# Patient Record
Sex: FEMALE | Race: WHITE | NOT HISPANIC OR LATINO
[De-identification: names, ages, dates, MRNs, and addresses within clinical notes are randomized per-mention and may not be internally consistent; named-entity substitution may affect disease eponyms.]

---

## 2018-08-31 ENCOUNTER — APPOINTMENT (OUTPATIENT)
Dept: CARDIOLOGY | Facility: CLINIC | Age: 58
End: 2018-08-31

## 2019-07-19 ENCOUNTER — APPOINTMENT (OUTPATIENT)
Dept: CARDIOLOGY | Facility: CLINIC | Age: 59
End: 2019-07-19

## 2019-11-11 ENCOUNTER — APPOINTMENT (OUTPATIENT)
Dept: ULTRASOUND IMAGING | Facility: IMAGING CENTER | Age: 59
End: 2019-11-11
Payer: MEDICARE

## 2019-11-11 ENCOUNTER — APPOINTMENT (OUTPATIENT)
Dept: CT IMAGING | Facility: IMAGING CENTER | Age: 59
End: 2019-11-11
Payer: MEDICARE

## 2019-11-11 ENCOUNTER — OUTPATIENT (OUTPATIENT)
Dept: OUTPATIENT SERVICES | Facility: HOSPITAL | Age: 59
LOS: 1 days | End: 2019-11-11
Payer: MEDICARE

## 2019-11-11 DIAGNOSIS — Z41.9 ENCOUNTER FOR PROCEDURE FOR PURPOSES OTHER THAN REMEDYING HEALTH STATE, UNSPECIFIED: Chronic | ICD-10-CM

## 2019-11-11 DIAGNOSIS — Z00.8 ENCOUNTER FOR OTHER GENERAL EXAMINATION: ICD-10-CM

## 2019-11-11 PROCEDURE — 93970 EXTREMITY STUDY: CPT | Mod: 26

## 2019-11-11 PROCEDURE — 71275 CT ANGIOGRAPHY CHEST: CPT | Mod: 26

## 2019-11-11 PROCEDURE — 93970 EXTREMITY STUDY: CPT

## 2019-11-11 PROCEDURE — 71275 CT ANGIOGRAPHY CHEST: CPT

## 2019-12-01 ENCOUNTER — FORM ENCOUNTER (OUTPATIENT)
Age: 59
End: 2019-12-01

## 2019-12-02 ENCOUNTER — APPOINTMENT (OUTPATIENT)
Dept: PULMONOLOGY | Facility: CLINIC | Age: 59
End: 2019-12-02
Payer: MEDICARE

## 2019-12-02 VITALS
TEMPERATURE: 98.2 F | WEIGHT: 120 LBS | SYSTOLIC BLOOD PRESSURE: 190 MMHG | HEART RATE: 87 BPM | HEIGHT: 60 IN | OXYGEN SATURATION: 96 % | BODY MASS INDEX: 23.56 KG/M2 | RESPIRATION RATE: 14 BRPM | DIASTOLIC BLOOD PRESSURE: 90 MMHG

## 2019-12-02 VITALS — DIASTOLIC BLOOD PRESSURE: 90 MMHG | SYSTOLIC BLOOD PRESSURE: 130 MMHG

## 2019-12-02 DIAGNOSIS — Z87.891 PERSONAL HISTORY OF NICOTINE DEPENDENCE: ICD-10-CM

## 2019-12-02 LAB — POCT - HEMOGLOBIN (HGB), QUANTITATIVE, TRANSCUTANEOUS: 10.4

## 2019-12-02 PROCEDURE — 94726 PLETHYSMOGRAPHY LUNG VOLUMES: CPT

## 2019-12-02 PROCEDURE — 94750: CPT

## 2019-12-02 PROCEDURE — 94729 DIFFUSING CAPACITY: CPT

## 2019-12-02 PROCEDURE — 94664 DEMO&/EVAL PT USE INHALER: CPT | Mod: 59

## 2019-12-02 PROCEDURE — 88738 HGB QUANT TRANSCUTANEOUS: CPT

## 2019-12-02 PROCEDURE — 99205 OFFICE O/P NEW HI 60 MIN: CPT | Mod: 25

## 2019-12-02 PROCEDURE — 94060 EVALUATION OF WHEEZING: CPT

## 2019-12-02 RX ORDER — CLOPIDOGREL 75 MG/1
75 TABLET, FILM COATED ORAL
Refills: 0 | Status: ACTIVE | COMMUNITY

## 2019-12-02 RX ORDER — ALBUTEROL SULFATE 90 UG/1
108 (90 BASE) AEROSOL, METERED RESPIRATORY (INHALATION)
Qty: 1 | Refills: 5 | Status: ACTIVE | COMMUNITY
Start: 2019-12-02 | End: 1900-01-01

## 2019-12-02 RX ORDER — OXYCODONE HYDROCHLORIDE 30 MG/1
30 TABLET ORAL
Refills: 0 | Status: ACTIVE | COMMUNITY

## 2019-12-02 RX ORDER — EZETIMIBE 10 MG/1
10 TABLET ORAL
Refills: 0 | Status: ACTIVE | COMMUNITY

## 2019-12-02 RX ORDER — PANTOPRAZOLE 40 MG/1
40 TABLET, DELAYED RELEASE ORAL
Refills: 0 | Status: ACTIVE | COMMUNITY

## 2019-12-02 RX ORDER — SIMVASTATIN 40 MG/1
40 TABLET, FILM COATED ORAL
Refills: 0 | Status: DISCONTINUED | COMMUNITY
End: 2019-12-02

## 2019-12-02 RX ORDER — ASPIRIN 325 MG/1
325 TABLET, FILM COATED ORAL
Refills: 0 | Status: ACTIVE | COMMUNITY

## 2019-12-02 RX ORDER — METOPROLOL SUCCINATE 50 MG/1
50 TABLET, EXTENDED RELEASE ORAL
Refills: 0 | Status: ACTIVE | COMMUNITY

## 2019-12-02 NOTE — PHYSICAL EXAM
[General Appearance - Well Developed] : well developed [General Appearance - Well Nourished] : well nourished [Normal Oropharynx] : normal oropharynx [Jugular Venous Distention Increased] : there was no jugular-venous distention [Thyroid Diffuse Enlargement] : the thyroid was not enlarged [Heart Sounds] : normal S1 and S2 [Murmurs] : no murmurs present [Lungs Percussion] : the lungs were normal to percussion [Auscultation Breath Sounds / Voice Sounds] : lungs were clear to auscultation bilaterally [Abdomen Soft] : soft [] : no hepato-splenomegaly [Abdomen Tenderness] : non-tender [Nail Clubbing] : no clubbing of the fingernails [Cyanosis, Localized] : no localized cyanosis

## 2019-12-03 NOTE — CONSULT LETTER
[Dear  ___] : Dear ~JEAN-CLAUDE, [Consult Letter:] : I had the pleasure of evaluating your patient, [unfilled]. [Consult Closing:] : Thank you very much for allowing me to participate in the care of this patient.  If you have any questions, please do not hesitate to contact me. [Please see my note below.] : Please see my note below. [Sincerely,] : Sincerely, [FreeTextEntry2] : Gomez Dupont MD\par  [FreeTextEntry3] : Prashanth Biswas MD FCCP\par

## 2019-12-03 NOTE — PROCEDURE
[FreeTextEntry1] : EXAM: CT ANGIO CHEST (W)AW IC \par \par PROCEDURE DATE: 11/11/2019 \par \par INTERPRETATION: Reason for Exam: Shortness of breath. \par \par CTA of the chest was performed from the thoracic inlet to the level of the \par adrenal glands following IV contrast injection of Omnipaque 350. No \par immediate complications were reported. MIP images were also created and \par reviewed. \par \par Comparison: Chest x-ray dated October 23, 2015 \par \par Tubes/Lines: Dual-chamber pacer in place. \par \par Mediastinum and Heart: Patient is status post CABG. There is multichamber \par cardiac enlargement. Aorta and pulmonary arteries are normal in size. There \par is no pericardial effusion. No lymphadenopathy. \par \par Lungs, Pleura, and Airways: There is no pulmonary embolus. Centrilobular \par emphysema noted. There is a opacity in the right upper lobe with air \par bronchograms. \par \par Trace right pleural effusion. \par \par Visualized Abdomen: The liver, spleen, pancreas and adrenals are \par unremarkable. Both kidneys enhance symmetrically. \par \par Bones and soft tissues: Spinal orthopedic hardware in place with \par postsurgical appearance of the spine. \par \par IMPRESSION: \par \par No acute pulmonary embolus. \par \par Focal opacity in the right upper lobe with air bronchograms. The \par differential for this includes infection as well as malignancy. Recommend \par follow-up chest CT in 4-6 weeks. \par \par Emphysema. \par \par The findings were discussed with Dr. Dupont at time of final signing. \par \par \par ANNA MONROY M.D., ATTENDING RADIOLOGIST \par This document has been electronically signed. Nov 11 2019 4:14PM \par \par 12/02/2019\par Pulmonary function testing\par These data demonstrate a mild obstructive ventilatory deficit. There is no significant bronchodilator response. Normal Lung Volumes. There is a mild diffusion impairment. Airway resistance is mildly increased.

## 2019-12-03 NOTE — ASSESSMENT
[FreeTextEntry1] : Course of Ceftin\par CT 3 weeks\par ENT Evaluation\par Avoid home until mold abatement completed.\par Avoid home until asbestos abatement completed\par PRN Beta Agonist.\par \par

## 2019-12-03 NOTE — HISTORY OF PRESENT ILLNESS
[FreeTextEntry1] : MADELINE HUERTA is a 59 year old  F referred for pulmonary evaluation for\par \par 10/24/19 Steam pipe broke. Since difficulty with SOB. \par Had reading from insurance company told asbestos and mold. Mold abatement not yet done. \par \par 2 days with dry paroxysmal cough. Not wheezing. Positive SOB also intermittant. No meds. \par Takes valium with some response. \par \par Had recent ST\par Had CT Chest \par \par Took Doxy for eyes prior to CAT Scan of 11/11/19

## 2019-12-03 NOTE — DISCUSSION/SUMMARY
[FreeTextEntry1] : RUL opacity may be infectious but of concern for neoplastic process.\par Small right pleural effusion\par COPD mild.\par Hoarseness.\par Mold exposure.\par Asbestos exposure.

## 2019-12-13 ENCOUNTER — FORM ENCOUNTER (OUTPATIENT)
Age: 59
End: 2019-12-13

## 2019-12-14 ENCOUNTER — OUTPATIENT (OUTPATIENT)
Dept: OUTPATIENT SERVICES | Facility: HOSPITAL | Age: 59
LOS: 1 days | End: 2019-12-14
Payer: MEDICARE

## 2019-12-14 ENCOUNTER — APPOINTMENT (OUTPATIENT)
Dept: CT IMAGING | Facility: IMAGING CENTER | Age: 59
End: 2019-12-14
Payer: MEDICARE

## 2019-12-14 DIAGNOSIS — Z41.9 ENCOUNTER FOR PROCEDURE FOR PURPOSES OTHER THAN REMEDYING HEALTH STATE, UNSPECIFIED: Chronic | ICD-10-CM

## 2019-12-14 DIAGNOSIS — R91.8 OTHER NONSPECIFIC ABNORMAL FINDING OF LUNG FIELD: ICD-10-CM

## 2019-12-14 PROCEDURE — 76377 3D RENDER W/INTRP POSTPROCES: CPT

## 2019-12-14 PROCEDURE — 72131 CT LUMBAR SPINE W/O DYE: CPT

## 2019-12-14 PROCEDURE — 76377 3D RENDER W/INTRP POSTPROCES: CPT | Mod: 26

## 2019-12-14 PROCEDURE — 71250 CT THORAX DX C-: CPT

## 2019-12-14 PROCEDURE — 72131 CT LUMBAR SPINE W/O DYE: CPT | Mod: 26

## 2019-12-14 PROCEDURE — 71250 CT THORAX DX C-: CPT | Mod: 26

## 2019-12-17 ENCOUNTER — APPOINTMENT (OUTPATIENT)
Dept: PULMONOLOGY | Facility: CLINIC | Age: 59
End: 2019-12-17
Payer: MEDICARE

## 2019-12-17 VITALS — HEART RATE: 71 BPM | DIASTOLIC BLOOD PRESSURE: 65 MMHG | OXYGEN SATURATION: 95 % | SYSTOLIC BLOOD PRESSURE: 111 MMHG

## 2019-12-17 PROCEDURE — 36415 COLL VENOUS BLD VENIPUNCTURE: CPT

## 2019-12-17 PROCEDURE — 99214 OFFICE O/P EST MOD 30 MIN: CPT | Mod: 25

## 2019-12-17 RX ORDER — CEFUROXIME AXETIL 500 MG/1
500 TABLET ORAL
Qty: 20 | Refills: 0 | Status: DISCONTINUED | COMMUNITY
Start: 2019-12-02 | End: 2019-12-17

## 2019-12-17 RX ORDER — FUROSEMIDE 40 MG/1
40 TABLET ORAL
Qty: 90 | Refills: 0 | Status: ACTIVE | COMMUNITY
Start: 2019-12-04

## 2019-12-17 RX ORDER — OXYCODONE AND ACETAMINOPHEN 10; 325 MG/1; MG/1
10-325 TABLET ORAL
Qty: 90 | Refills: 0 | Status: ACTIVE | COMMUNITY
Start: 2019-12-07

## 2019-12-18 LAB
BASOPHILS # BLD AUTO: 0.04 K/UL
BASOPHILS NFR BLD AUTO: 0.5 %
EOSINOPHIL # BLD AUTO: 0.17 K/UL
EOSINOPHIL NFR BLD AUTO: 2.2 %
HCT VFR BLD CALC: 38.1 %
HGB BLD-MCNC: 12.8 G/DL
IMM GRANULOCYTES NFR BLD AUTO: 0.1 %
LYMPHOCYTES # BLD AUTO: 1.46 K/UL
LYMPHOCYTES NFR BLD AUTO: 18.9 %
MAN DIFF?: NORMAL
MCHC RBC-ENTMCNC: 33.6 GM/DL
MCHC RBC-ENTMCNC: 34.5 PG
MCV RBC AUTO: 102.7 FL
MONOCYTES # BLD AUTO: 0.51 K/UL
MONOCYTES NFR BLD AUTO: 6.6 %
NEUTROPHILS # BLD AUTO: 5.54 K/UL
NEUTROPHILS NFR BLD AUTO: 71.7 %
PLATELET # BLD AUTO: 236 K/UL
RAPID RVP RESULT: NOT DETECTED
RBC # BLD: 3.71 M/UL
RBC # FLD: 14.5 %
WBC # FLD AUTO: 7.73 K/UL

## 2019-12-18 NOTE — CONSULT LETTER
[Dear  ___] : Dear ~JEAN-CLAUDE, [Please see my note below.] : Please see my note below. [Consult Letter:] : I had the pleasure of evaluating your patient, [unfilled]. [Consult Closing:] : Thank you very much for allowing me to participate in the care of this patient.  If you have any questions, please do not hesitate to contact me. [Sincerely,] : Sincerely, [FreeTextEntry2] : Gomez Dupont MD\par  [FreeTextEntry3] : Prashanth Biswas MD FCCP\par

## 2019-12-18 NOTE — PHYSICAL EXAM
[General Appearance - Well Developed] : well developed [General Appearance - Well Nourished] : well nourished [Normal Oropharynx] : normal oropharynx [Thyroid Diffuse Enlargement] : the thyroid was not enlarged [Jugular Venous Distention Increased] : there was no jugular-venous distention [Heart Sounds] : normal S1 and S2 [Murmurs] : no murmurs present [Lungs Percussion] : the lungs were normal to percussion [Abdomen Tenderness] : non-tender [Abdomen Soft] : soft [Nail Clubbing] : no clubbing of the fingernails [] : no hepato-splenomegaly [Cyanosis, Localized] : no localized cyanosis [FreeTextEntry1] : 1 plus crackles right base

## 2019-12-18 NOTE — ASSESSMENT
[FreeTextEntry1] : \par Restart Doxycycline\par ENT Evaluation\par Labs pending with blood cultures\par Quantiferon\par PRN Beta Agonist\par CT in 3 months.\par \par

## 2019-12-18 NOTE — HISTORY OF PRESENT ILLNESS
[FreeTextEntry1] : \par Yesterday am had chills and temp of 102.4 took Advil and broke the fever. This am 100 temp. mild dyspnea no inhalers use. Done with antibiotic Friday 13 and had ct on the 14th. Went to dr alexander and had blood cultures today and cbc was normal\par Needs a name for a ent md for hoarseness\par Feels a little run down.\par Mild cough.\par Saturday very congested. \par \par Took Doxy for eyes prior to CAT Scan of 11/11/19

## 2019-12-18 NOTE — DISCUSSION/SUMMARY
[FreeTextEntry1] : RUL opacity likely infectious inflammatory. Has improved and not present on CT of 8/1/19\par Fever of unclear etiology\par COPD mild.\par Hoarseness.\par Mold exposure.\par Asbestos exposure.\par New crackles right base without radiographic evidence of pneumonia on CT.

## 2019-12-20 LAB
M TB IFN-G BLD-IMP: NEGATIVE
QUANTIFERON TB PLUS MITOGEN MINUS NIL: 3.85 IU/ML
QUANTIFERON TB PLUS NIL: 0.01 IU/ML
QUANTIFERON TB PLUS TB1 MINUS NIL: 0 IU/ML
QUANTIFERON TB PLUS TB2 MINUS NIL: 0 IU/ML

## 2020-01-22 ENCOUNTER — APPOINTMENT (OUTPATIENT)
Dept: OTOLARYNGOLOGY | Facility: CLINIC | Age: 60
End: 2020-01-22
Payer: MEDICARE

## 2020-01-22 VITALS
BODY MASS INDEX: 23.56 KG/M2 | HEART RATE: 65 BPM | DIASTOLIC BLOOD PRESSURE: 82 MMHG | HEIGHT: 60 IN | SYSTOLIC BLOOD PRESSURE: 170 MMHG | WEIGHT: 120 LBS

## 2020-01-22 DIAGNOSIS — R49.0 DYSPHONIA: ICD-10-CM

## 2020-01-22 DIAGNOSIS — J38.7 OTHER DISEASES OF LARYNX: ICD-10-CM

## 2020-01-22 DIAGNOSIS — E04.1 NONTOXIC SINGLE THYROID NODULE: ICD-10-CM

## 2020-01-22 DIAGNOSIS — R09.89 OTHER SPECIFIED SYMPTOMS AND SIGNS INVOLVING THE CIRCULATORY AND RESPIRATORY SYSTEMS: ICD-10-CM

## 2020-01-22 PROCEDURE — 99204 OFFICE O/P NEW MOD 45 MIN: CPT | Mod: 25

## 2020-01-22 PROCEDURE — 31575 DIAGNOSTIC LARYNGOSCOPY: CPT

## 2020-01-22 RX ORDER — LOSARTAN POTASSIUM 50 MG/1
50 TABLET, FILM COATED ORAL
Qty: 180 | Refills: 0 | Status: ACTIVE | COMMUNITY
Start: 2020-01-04

## 2020-01-22 RX ORDER — CLOTRIMAZOLE AND BETAMETHASONE DIPROPIONATE 10; .5 MG/ML; MG/ML
1-0.05 LOTION TOPICAL
Qty: 30 | Refills: 0 | Status: ACTIVE | COMMUNITY
Start: 2019-11-15

## 2020-01-22 RX ORDER — NEOMYCIN AND POLYMYXIN B SULFATES AND DEXAMETHASONE 3.5; 10000; 1 MG/G; [IU]/G; MG/G
3.5-10000-0.1 OINTMENT OPHTHALMIC
Qty: 4 | Refills: 0 | Status: ACTIVE | COMMUNITY
Start: 2019-11-15

## 2020-01-22 RX ORDER — ATORVASTATIN CALCIUM 40 MG/1
40 TABLET, FILM COATED ORAL
Qty: 90 | Refills: 0 | Status: ACTIVE | COMMUNITY
Start: 2019-11-06

## 2020-01-22 NOTE — HISTORY OF PRESENT ILLNESS
[de-identified] : 60 y/o F, notes 40 years of Raspy voice.  Notes Hx ACDF in 2002.  Also notes Hx VC polyps removed in 1998.  Notes voice has been this way since having nasal polyps in 1998.  \par Pos globus sensation and feeling of food getting stuck.  No choking with earing.  \par Smoked 2 PPD x 40+ years, Quit smoking 7 months ago.

## 2020-01-22 NOTE — CONSULT LETTER
[Consult Letter:] : I had the pleasure of evaluating your patient, [unfilled]. [Dear  ___] : Dear  [unfilled], [Please see my note below.] : Please see my note below. [Consult Closing:] : Thank you very much for allowing me to participate in the care of this patient.  If you have any questions, please do not hesitate to contact me. [Sincerely,] : Sincerely, [FreeTextEntry3] : Sara Steward MD\par Otolaryngology and Cranial Base Surgery\par Attending Physician - Department of Otolaryngology and Head & Neck Surgery\par [facility]\par  - [employer] [facility]\par Office: [phone]\par Fax: [phone]\par

## 2020-01-22 NOTE — PROCEDURE
[de-identified] : Afrin 0.05% and lidocaine 4% sprayed into both nasal passages. Flexible scope #2 used. Passed through nasal passage and nasopharynx/oropharynx/hypopharynx clear. Supraglottis with right aryepiglottic flod with small yellow cyst with appearance of retention mucus cyst. Glottis with fully mobile vocal cords without lesions or masses although diffuse erythema noted. Visualized subglottis clear. Postcricoid area without erythema or edema. No pooling of secretions. No hardware visible under posterior pharyngeal wall.

## 2020-01-22 NOTE — ASSESSMENT
[FreeTextEntry1] : hoarseness:\par - likely secondary to long hx of smoking and hx of vocal cord surgery\par - reassured no lesions or polyps on cords\par \par aryepiglottic fold cyst:\par - appears benign but will rescope in 2 months to keep check on it in light of smoking history\par \par extruded anterior cervical plate screw:\par - appears to be completely out but incompletely imaged on CT chest from 12/2019 and pt reports no cervical imaging recently so will order CT cervical spine to fully eval and determine if needs removal\par \par hx thyroid nodule:\par - 9mm nodule noted on scan from 2016 and pt hasn’t had repeat imaging so will repeat thyroid us for surveillance\par will call with CT report

## 2020-01-22 NOTE — PHYSICAL EXAM
[Midline] : trachea located in midline position [Normal] : no rashes [de-identified] : well healed neck incision

## 2020-01-30 ENCOUNTER — FORM ENCOUNTER (OUTPATIENT)
Age: 60
End: 2020-01-30

## 2020-01-31 ENCOUNTER — APPOINTMENT (OUTPATIENT)
Dept: CT IMAGING | Facility: IMAGING CENTER | Age: 60
End: 2020-01-31
Payer: MEDICARE

## 2020-01-31 ENCOUNTER — OUTPATIENT (OUTPATIENT)
Dept: OUTPATIENT SERVICES | Facility: HOSPITAL | Age: 60
LOS: 1 days | End: 2020-01-31
Payer: MEDICARE

## 2020-01-31 ENCOUNTER — APPOINTMENT (OUTPATIENT)
Dept: ULTRASOUND IMAGING | Facility: IMAGING CENTER | Age: 60
End: 2020-01-31
Payer: MEDICARE

## 2020-01-31 DIAGNOSIS — Z41.9 ENCOUNTER FOR PROCEDURE FOR PURPOSES OTHER THAN REMEDYING HEALTH STATE, UNSPECIFIED: Chronic | ICD-10-CM

## 2020-01-31 DIAGNOSIS — E04.1 NONTOXIC SINGLE THYROID NODULE: ICD-10-CM

## 2020-01-31 DIAGNOSIS — T84.216A BREAKDOWN (MECHANICAL) OF INTERNAL FIXATION DEVICE OF VERTEBRAE, INITIAL ENCOUNTER: ICD-10-CM

## 2020-01-31 PROCEDURE — 76536 US EXAM OF HEAD AND NECK: CPT | Mod: 26

## 2020-01-31 PROCEDURE — 72125 CT NECK SPINE W/O DYE: CPT

## 2020-01-31 PROCEDURE — 76377 3D RENDER W/INTRP POSTPROCES: CPT

## 2020-01-31 PROCEDURE — 76377 3D RENDER W/INTRP POSTPROCES: CPT | Mod: 26

## 2020-01-31 PROCEDURE — 76536 US EXAM OF HEAD AND NECK: CPT

## 2020-01-31 PROCEDURE — 72125 CT NECK SPINE W/O DYE: CPT | Mod: 26

## 2020-02-13 ENCOUNTER — INPATIENT (INPATIENT)
Facility: HOSPITAL | Age: 60
LOS: 15 days | Discharge: HOME | DRG: 496 | End: 2020-02-29
Attending: INTERNAL MEDICINE | Admitting: INTERNAL MEDICINE
Payer: MEDICARE

## 2020-02-13 VITALS
TEMPERATURE: 98 F | SYSTOLIC BLOOD PRESSURE: 129 MMHG | DIASTOLIC BLOOD PRESSURE: 74 MMHG | OXYGEN SATURATION: 97 % | HEART RATE: 67 BPM | WEIGHT: 134.92 LBS | RESPIRATION RATE: 18 BRPM | HEIGHT: 60 IN

## 2020-02-13 DIAGNOSIS — Z41.9 ENCOUNTER FOR PROCEDURE FOR PURPOSES OTHER THAN REMEDYING HEALTH STATE, UNSPECIFIED: Chronic | ICD-10-CM

## 2020-02-13 DIAGNOSIS — R50.9 FEVER, UNSPECIFIED: ICD-10-CM

## 2020-02-13 LAB
ALBUMIN SERPL ELPH-MCNC: 4.3 G/DL — SIGNIFICANT CHANGE UP (ref 3.3–5)
ALP SERPL-CCNC: 86 U/L — SIGNIFICANT CHANGE UP (ref 40–120)
ALT FLD-CCNC: 9 U/L — LOW (ref 10–45)
ANION GAP SERPL CALC-SCNC: 15 MMOL/L — SIGNIFICANT CHANGE UP (ref 5–17)
APPEARANCE UR: CLEAR — SIGNIFICANT CHANGE UP
APTT BLD: 28.1 SEC — SIGNIFICANT CHANGE UP (ref 27.5–36.3)
AST SERPL-CCNC: 13 U/L — SIGNIFICANT CHANGE UP (ref 10–40)
BACTERIA # UR AUTO: NEGATIVE — SIGNIFICANT CHANGE UP
BASE EXCESS BLDV CALC-SCNC: 4.1 MMOL/L — HIGH (ref -2–2)
BASOPHILS # BLD AUTO: 0.03 K/UL — SIGNIFICANT CHANGE UP (ref 0–0.2)
BASOPHILS NFR BLD AUTO: 0.6 % — SIGNIFICANT CHANGE UP (ref 0–2)
BILIRUB SERPL-MCNC: 0.8 MG/DL — SIGNIFICANT CHANGE UP (ref 0.2–1.2)
BILIRUB UR-MCNC: NEGATIVE — SIGNIFICANT CHANGE UP
BUN SERPL-MCNC: 14 MG/DL — SIGNIFICANT CHANGE UP (ref 7–23)
CA-I SERPL-SCNC: 1.21 MMOL/L — SIGNIFICANT CHANGE UP (ref 1.12–1.3)
CALCIUM SERPL-MCNC: 9.9 MG/DL — SIGNIFICANT CHANGE UP (ref 8.4–10.5)
CHLORIDE BLDV-SCNC: 102 MMOL/L — SIGNIFICANT CHANGE UP (ref 96–108)
CHLORIDE SERPL-SCNC: 100 MMOL/L — SIGNIFICANT CHANGE UP (ref 96–108)
CO2 BLDV-SCNC: 31 MMOL/L — HIGH (ref 22–30)
CO2 SERPL-SCNC: 25 MMOL/L — SIGNIFICANT CHANGE UP (ref 22–31)
COLOR SPEC: YELLOW — SIGNIFICANT CHANGE UP
CREAT SERPL-MCNC: 0.78 MG/DL — SIGNIFICANT CHANGE UP (ref 0.5–1.3)
DIFF PNL FLD: NEGATIVE — SIGNIFICANT CHANGE UP
EOSINOPHIL # BLD AUTO: 0.13 K/UL — SIGNIFICANT CHANGE UP (ref 0–0.5)
EOSINOPHIL NFR BLD AUTO: 2.6 % — SIGNIFICANT CHANGE UP (ref 0–6)
EPI CELLS # UR: 6 /HPF — HIGH
FLU A RESULT: SIGNIFICANT CHANGE UP
FLU A RESULT: SIGNIFICANT CHANGE UP
FLUAV AG NPH QL: SIGNIFICANT CHANGE UP
FLUBV AG NPH QL: SIGNIFICANT CHANGE UP
GAS PNL BLDV: 141 MMOL/L — SIGNIFICANT CHANGE UP (ref 135–145)
GAS PNL BLDV: SIGNIFICANT CHANGE UP
GAS PNL BLDV: SIGNIFICANT CHANGE UP
GLUCOSE BLDV-MCNC: 107 MG/DL — HIGH (ref 70–99)
GLUCOSE SERPL-MCNC: 111 MG/DL — HIGH (ref 70–99)
GLUCOSE UR QL: NEGATIVE — SIGNIFICANT CHANGE UP
HCO3 BLDV-SCNC: 30 MMOL/L — HIGH (ref 21–29)
HCT VFR BLD CALC: 35.6 % — SIGNIFICANT CHANGE UP (ref 34.5–45)
HCT VFR BLDA CALC: 38 % — LOW (ref 39–50)
HGB BLD CALC-MCNC: 12.5 G/DL — SIGNIFICANT CHANGE UP (ref 11.5–15.5)
HGB BLD-MCNC: 11.9 G/DL — SIGNIFICANT CHANGE UP (ref 11.5–15.5)
HYALINE CASTS # UR AUTO: 2 /LPF — SIGNIFICANT CHANGE UP (ref 0–2)
IMM GRANULOCYTES NFR BLD AUTO: 0.2 % — SIGNIFICANT CHANGE UP (ref 0–1.5)
INR BLD: 1.08 RATIO — SIGNIFICANT CHANGE UP (ref 0.88–1.16)
KETONES UR-MCNC: NEGATIVE — SIGNIFICANT CHANGE UP
LACTATE BLDV-MCNC: 1.8 MMOL/L — SIGNIFICANT CHANGE UP (ref 0.7–2)
LEUKOCYTE ESTERASE UR-ACNC: ABNORMAL
LYMPHOCYTES # BLD AUTO: 1.68 K/UL — SIGNIFICANT CHANGE UP (ref 1–3.3)
LYMPHOCYTES # BLD AUTO: 34 % — SIGNIFICANT CHANGE UP (ref 13–44)
MCHC RBC-ENTMCNC: 33.4 GM/DL — SIGNIFICANT CHANGE UP (ref 32–36)
MCHC RBC-ENTMCNC: 34 PG — SIGNIFICANT CHANGE UP (ref 27–34)
MCV RBC AUTO: 101.7 FL — HIGH (ref 80–100)
MONOCYTES # BLD AUTO: 0.47 K/UL — SIGNIFICANT CHANGE UP (ref 0–0.9)
MONOCYTES NFR BLD AUTO: 9.5 % — SIGNIFICANT CHANGE UP (ref 2–14)
NEUTROPHILS # BLD AUTO: 2.62 K/UL — SIGNIFICANT CHANGE UP (ref 1.8–7.4)
NEUTROPHILS NFR BLD AUTO: 53.1 % — SIGNIFICANT CHANGE UP (ref 43–77)
NITRITE UR-MCNC: NEGATIVE — SIGNIFICANT CHANGE UP
NRBC # BLD: 0 /100 WBCS — SIGNIFICANT CHANGE UP (ref 0–0)
PCO2 BLDV: 51 MMHG — HIGH (ref 35–50)
PH BLDV: 7.38 — SIGNIFICANT CHANGE UP (ref 7.35–7.45)
PH UR: 5.5 — SIGNIFICANT CHANGE UP (ref 5–8)
PLATELET # BLD AUTO: 265 K/UL — SIGNIFICANT CHANGE UP (ref 150–400)
PO2 BLDV: 25 MMHG — SIGNIFICANT CHANGE UP (ref 25–45)
POTASSIUM BLDV-SCNC: 3.5 MMOL/L — SIGNIFICANT CHANGE UP (ref 3.5–5.3)
POTASSIUM SERPL-MCNC: 3.3 MMOL/L — LOW (ref 3.5–5.3)
POTASSIUM SERPL-SCNC: 3.3 MMOL/L — LOW (ref 3.5–5.3)
PROT SERPL-MCNC: 8 G/DL — SIGNIFICANT CHANGE UP (ref 6–8.3)
PROT UR-MCNC: NEGATIVE — SIGNIFICANT CHANGE UP
PROTHROM AB SERPL-ACNC: 12.3 SEC — SIGNIFICANT CHANGE UP (ref 10–12.9)
RBC # BLD: 3.5 M/UL — LOW (ref 3.8–5.2)
RBC # FLD: 13.7 % — SIGNIFICANT CHANGE UP (ref 10.3–14.5)
RBC CASTS # UR COMP ASSIST: 1 /HPF — SIGNIFICANT CHANGE UP (ref 0–4)
RSV RESULT: SIGNIFICANT CHANGE UP
RSV RNA RESP QL NAA+PROBE: SIGNIFICANT CHANGE UP
SAO2 % BLDV: 34 % — LOW (ref 67–88)
SODIUM SERPL-SCNC: 140 MMOL/L — SIGNIFICANT CHANGE UP (ref 135–145)
SP GR SPEC: 1.02 — SIGNIFICANT CHANGE UP (ref 1.01–1.02)
UROBILINOGEN FLD QL: NEGATIVE — SIGNIFICANT CHANGE UP
WBC # BLD: 4.94 K/UL — SIGNIFICANT CHANGE UP (ref 3.8–10.5)
WBC # FLD AUTO: 4.94 K/UL — SIGNIFICANT CHANGE UP (ref 3.8–10.5)
WBC UR QL: 30 /HPF — HIGH (ref 0–5)

## 2020-02-13 PROCEDURE — 70491 CT SOFT TISSUE NECK W/DYE: CPT | Mod: 26

## 2020-02-13 PROCEDURE — 93010 ELECTROCARDIOGRAM REPORT: CPT

## 2020-02-13 PROCEDURE — 99223 1ST HOSP IP/OBS HIGH 75: CPT

## 2020-02-13 PROCEDURE — 99285 EMERGENCY DEPT VISIT HI MDM: CPT

## 2020-02-13 PROCEDURE — 71045 X-RAY EXAM CHEST 1 VIEW: CPT | Mod: 26

## 2020-02-13 RX ORDER — CLOPIDOGREL BISULFATE 75 MG/1
75 TABLET, FILM COATED ORAL DAILY
Refills: 0 | Status: DISCONTINUED | OUTPATIENT
Start: 2020-02-13 | End: 2020-02-21

## 2020-02-13 RX ORDER — ASPIRIN/CALCIUM CARB/MAGNESIUM 324 MG
325 TABLET ORAL DAILY
Refills: 0 | Status: DISCONTINUED | OUTPATIENT
Start: 2020-02-13 | End: 2020-02-21

## 2020-02-13 RX ORDER — METOPROLOL TARTRATE 50 MG
50 TABLET ORAL
Refills: 0 | Status: DISCONTINUED | OUTPATIENT
Start: 2020-02-13 | End: 2020-02-16

## 2020-02-13 RX ORDER — OXYCODONE HYDROCHLORIDE 5 MG/1
30 TABLET ORAL
Refills: 0 | Status: DISCONTINUED | OUTPATIENT
Start: 2020-02-13 | End: 2020-02-15

## 2020-02-13 RX ORDER — OXYCODONE HYDROCHLORIDE 5 MG/1
1 TABLET ORAL
Qty: 0 | Refills: 0 | DISCHARGE

## 2020-02-13 RX ORDER — POTASSIUM CHLORIDE 20 MEQ
40 PACKET (EA) ORAL ONCE
Refills: 0 | Status: COMPLETED | OUTPATIENT
Start: 2020-02-13 | End: 2020-02-13

## 2020-02-13 RX ORDER — PANTOPRAZOLE SODIUM 20 MG/1
40 TABLET, DELAYED RELEASE ORAL
Refills: 0 | Status: DISCONTINUED | OUTPATIENT
Start: 2020-02-13 | End: 2020-02-25

## 2020-02-13 RX ORDER — HYDROMORPHONE HYDROCHLORIDE 2 MG/ML
1 INJECTION INTRAMUSCULAR; INTRAVENOUS; SUBCUTANEOUS ONCE
Refills: 0 | Status: DISCONTINUED | OUTPATIENT
Start: 2020-02-13 | End: 2020-02-13

## 2020-02-13 RX ORDER — HEPARIN SODIUM 5000 [USP'U]/ML
5000 INJECTION INTRAVENOUS; SUBCUTANEOUS EVERY 12 HOURS
Refills: 0 | Status: DISCONTINUED | OUTPATIENT
Start: 2020-02-13 | End: 2020-02-25

## 2020-02-13 RX ORDER — FUROSEMIDE 40 MG
40 TABLET ORAL DAILY
Refills: 0 | Status: DISCONTINUED | OUTPATIENT
Start: 2020-02-13 | End: 2020-02-25

## 2020-02-13 RX ORDER — DIAZEPAM 5 MG
10 TABLET ORAL
Refills: 0 | Status: DISCONTINUED | OUTPATIENT
Start: 2020-02-13 | End: 2020-02-20

## 2020-02-13 RX ORDER — LOSARTAN POTASSIUM 100 MG/1
50 TABLET, FILM COATED ORAL
Refills: 0 | Status: DISCONTINUED | OUTPATIENT
Start: 2020-02-13 | End: 2020-02-16

## 2020-02-13 RX ORDER — ATORVASTATIN CALCIUM 80 MG/1
40 TABLET, FILM COATED ORAL AT BEDTIME
Refills: 0 | Status: DISCONTINUED | OUTPATIENT
Start: 2020-02-13 | End: 2020-02-25

## 2020-02-13 RX ADMIN — Medication 40 MILLIEQUIVALENT(S): at 20:50

## 2020-02-13 RX ADMIN — Medication 325 MILLIGRAM(S): at 21:19

## 2020-02-13 RX ADMIN — OXYCODONE HYDROCHLORIDE 30 MILLIGRAM(S): 5 TABLET ORAL at 20:50

## 2020-02-13 RX ADMIN — LOSARTAN POTASSIUM 50 MILLIGRAM(S): 100 TABLET, FILM COATED ORAL at 21:19

## 2020-02-13 RX ADMIN — OXYCODONE HYDROCHLORIDE 30 MILLIGRAM(S): 5 TABLET ORAL at 21:20

## 2020-02-13 RX ADMIN — HYDROMORPHONE HYDROCHLORIDE 1 MILLIGRAM(S): 2 INJECTION INTRAMUSCULAR; INTRAVENOUS; SUBCUTANEOUS at 16:56

## 2020-02-13 RX ADMIN — HYDROMORPHONE HYDROCHLORIDE 1 MILLIGRAM(S): 2 INJECTION INTRAMUSCULAR; INTRAVENOUS; SUBCUTANEOUS at 17:15

## 2020-02-13 RX ADMIN — ATORVASTATIN CALCIUM 40 MILLIGRAM(S): 80 TABLET, FILM COATED ORAL at 21:19

## 2020-02-13 RX ADMIN — Medication 50 MILLIGRAM(S): at 21:19

## 2020-02-13 NOTE — H&P ADULT - NSICDXFAMILYHX_GEN_ALL_CORE_FT
FAMILY HISTORY:  Family history of coronary arteriosclerosis  Family history of heart disease, Mom and Dad - CABG x 4

## 2020-02-13 NOTE — H&P ADULT - NSHPPHYSICALEXAM_GEN_ALL_CORE
PHYSICAL EXAMINATION:  Vital Signs Last 24 Hrs  T(C): 36.9 (13 Feb 2020 18:20), Max: 36.9 (13 Feb 2020 18:20)  T(F): 98.4 (13 Feb 2020 18:20), Max: 98.4 (13 Feb 2020 18:20)  HR: 65 (13 Feb 2020 18:20) (65 - 68)  BP: 109/51 (13 Feb 2020 18:20) (109/51 - 133/64)  BP(mean): --  RR: 17 (13 Feb 2020 18:20) (16 - 18)  SpO2: 97% (13 Feb 2020 18:20) (97% - 98%)  CAPILLARY BLOOD GLUCOSE          GENERAL: NAD, well-groomed, well-developed  HEAD:  atraumatic, normocephalic  EYES: sclera anicteric  ENMT: mucous membranes moist  NECK: supple, No JVD  CHEST/LUNG: clear to auscultation bilaterally; no rales, rhonchi, or wheezing b/l  HEART: normal S1, S2  ABDOMEN: BS+, soft, ND, NT   EXTREMITIES:  pulses palpable; no clubbing, cyanosis, or edema b/l LEs  NEURO: awake, alert, interactive; moves all extremities  SKIN: no rashes or lesions

## 2020-02-13 NOTE — CONSULT NOTE ADULT - ASSESSMENT
Mrs Mitchell is a 59 year old woman with history of CAD, Afib with PPM, and multiple spinal surgeries with hardware who presented to the ED for fevers. Her CT C-spine shows air and debris around the screw in C5, very concerning for infection. She is currently well appearing, does not have a leukocytosis, and is afebrile.     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  - Follow up cultures  - Monitor for fevers  - Trend WBCs  - If the patient clinically deteriorates or becomes septic, would start empiric vancomycin and Zosyn      Marine Cervantes, PGY-4  Infectious Disease Fellow   Pager: [phone]  After 5pm/weekends: [phone]

## 2020-02-13 NOTE — H&P ADULT - NSICDXPASTSURGICALHX_GEN_ALL_CORE_FT
PAST SURGICAL HISTORY:  Elective surgery AICD 2006    Elective surgery Right leg art bypass 2006    Elective surgery Left leg "clean out" secondary PAD    H/O cervical spine surgery     History of back surgery T10 - L4 posterior fusion    S/P coronary artery bypass graft x 4 2006    S/P coronary artery stent placement x 3  - 2010    S/P lumbar spine operation laminectomy

## 2020-02-13 NOTE — ED PROVIDER NOTE - OBJECTIVE STATEMENT
59F with PMH/PSH including CAD s/p 4vCABG, MI, AICD, stents x 3 on Plavix and ASA, HLD, pAFib, chronic back pain, T10-L5 posterior spinal fusion for lumbar disc disease with radiculopathy 6/19/14), hip osteoarthritis s/p total hip arthroplasty 7/13/15, s/p open posterior fusion of 2 or more  joints of posterior column of L vertebrae (11/5/15) presents to the ED sent in by Dr. Dupont with a note reading "C spine with gas, implying erosion into esophagus or gas forming bacteria around the screw." Requesting admission to Dr. Zimmerman and Dr. Alva for neurosurgery.  Patient reports that she has been having fevers for two months, intermittently lasting about 8 hours each time and spontaneously resolving.  Reports has been hoarse for many years, reports was sent to ENT, scoped and sent for CT C spine showing that stated result.  Pt had this CT 2 weeks ago but did not have fever so did not feel she had to come in.  Does report neck pain but no numbness weakness to UE's.  No other focal deficit.  Fevers get better and wrose on their own.

## 2020-02-13 NOTE — H&P ADULT - NSHPLABSRESULTS_GEN_ALL_CORE
11.9   4.94  )-----------( 265      ( 2020 15:51 )             35.6       02-13    140  |  100  |  14  ----------------------------<  111<H>  3.3<L>   |  25  |  0.78    Ca    9.9      2020 15:51    TPro  8.0  /  Alb  4.3  /  TBili  0.8  /  DBili  x   /  AST  13  /  ALT  9<L>  /  AlkPhos  86  02-13              Urinalysis Basic - ( 2020 16:36 )    Color: Yellow / Appearance: Clear / S.017 / pH: x  Gluc: x / Ketone: Negative  / Bili: Negative / Urobili: Negative   Blood: x / Protein: Negative / Nitrite: Negative   Leuk Esterase: Large / RBC: 1 /hpf / WBC 30 /HPF   Sq Epi: x / Non Sq Epi: 6 /hpf / Bacteria: Negative        PT/INR - ( 2020 15:51 )   PT: 12.3 sec;   INR: 1.08 ratio         PTT - ( 2020 15:51 )  PTT:28.1 sec    < from: Xray Chest 1 View AP/PA (20 @ 16:10) >    IMPRESSION:  Developingor resolving right upper lobe infiltrate.    < end of copied text >

## 2020-02-13 NOTE — H&P ADULT - NSHPREVIEWOFSYSTEMS_GEN_ALL_CORE
REVIEW OF SYSTEMS:    CONSTITUTIONAL: No weakness, + fevers no chills  EYES/ENT: No visual changes;  No vertigo or throat pain   NECK: No pain or stiffness  RESPIRATORY: No cough, wheezing, hemoptysis; No shortness of breath  CARDIOVASCULAR: No chest pain or palpitations  GASTROINTESTINAL: No abdominal or epigastric pain. No nausea, vomiting, or hematemesis; No diarrhea or constipation. No melena or hematochezia.  GENITOURINARY: No dysuria, frequency or hematuria  NEUROLOGICAL: No numbness or weakness  SKIN: No itching, burning, rashes, or lesions   All other review of systems is negative unless indicated above.
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
## 2020-02-13 NOTE — ED PROVIDER NOTE - PSH
Elective surgery  Left leg "clean out" secondary PAD  Elective surgery  Right leg art bypass 2006  Elective surgery  AICD 2006  H/O cervical spine surgery    History of back surgery  T10 - L4 posterior fusion  S/P coronary artery bypass graft x 4  2006  S/P coronary artery stent placement  x 3  - 2010  S/P lumbar spine operation  laminectomy

## 2020-02-13 NOTE — ED ADULT NURSE NOTE - OBJECTIVE STATEMENT
60 y/o female patient presents ambulatory to ED with family at the bedside, sent from Dr. Anaid Dupont's office with a note "C spine with gas, implying erosion into esophagus or gas forming bacteria around the screw". Requesting IV antibiotics and possible surgical intervention. Patient states she has had intermittent fevers x "months" but states the other day max temp at home"103F". Patient states she does have neck pain, denies numbness/weakness to lower extremities. Patient denies SOB and CP, N/V/D; afebrile in ED.

## 2020-02-13 NOTE — H&P ADULT - ASSESSMENT
59 f with    Abnormal hardware Cervical spine  - ID evaluation/ observe off antibiotics  - Neurosurgical evaluation  - CT neck w/wo contrast pending . Unable to get MRI 2 to AICD  - pain control  - ESR, CRP    Possible esophageal fistula  - GI evaluation    AICD (automatic cardioverter/defibrillator) present   - EP check    Arthritis   - pain control    Atrial Fibrillation paroxismal Dx in 08/2009  rate control    Back pain   - pain control    CAD (Coronary Artery Disease)   - continue Rx  - cardiology evaluation     Hypercholesteremia   - continue Rx    PAD (peripheral artery disease) r leg 06  - stable    Further action as per clinical course   Mega Zimmerman MD pager [phone]

## 2020-02-13 NOTE — CONSULT NOTE ADULT - ASSESSMENT
Paula, Lela  59F PMHx CAD s/p 4vCABG, MI, AICD, stents x3 on ASA plavix, HLD, afib, back pain s/p C5-C6 ACDF (2002) and T10-P fusion, sent in my PMD after ENT scoped and ordered CT c-spine which shows hardware and fusion failure at C5 with air around hardware, adjacent to esophagus. She has had intermittent fevers for 4 months but otherwise no complaints. CT L shows fused T10-P construct. Exam: neurointact, denies pain.  - No acute neurosurgical intervention  - Admitted to medicine  - Medicine/ID w/u for fever/infection  - ENT consult  - MRI C-spine w/wo if pacer compatible  - Plan pending d/w attending

## 2020-02-13 NOTE — H&P ADULT - NSHPSOCIALHISTORY_GEN_ALL_CORE
Social History:    Marital Status:  ( x  )    (   ) Single    (   )    (  )   Occupation:   Lives with: (  ) alone  (  ) children   ( x ) spouse   (  ) parents  (  ) other    Substance Use (street drugs): ( x ) never used  (  ) other:  Tobacco Usage:  (   ) never smoked   (  x ) former smoker   (   ) current smoker  (     ) pack years  (        ) last cigarette date  Alcohol Usage: denies    (     ) Advanced Directives: (     ) None    (      ) DNR    (     ) DNI    (     ) Health Care Proxy:

## 2020-02-13 NOTE — ED ADULT TRIAGE NOTE - CHIEF COMPLAINT QUOTE
Pt was sent by  MD Gomez Dupont  For admission for management of C Spine with  Gas  forming bacteria around the screw  for IV antibiotic

## 2020-02-13 NOTE — PATIENT PROFILE ADULT - FALLEN IN THE PAST
[facility]  [address] [id number] [phone]  Dept: [phone]    PRE-OP EVALUATION:  Today's date: 2017    Teresa Brian (: 1975) presents for pre-operative evaluation assessment as requested by Dr. Bryson Case.  She requires evaluation and anesthesia risk assessment prior to undergoing surgery/procedure for treatment of cataract .  Proposed procedure: Left eye cataract    Date of Surgery/ Procedure: 2017  Time of Surgery/ Procedure: 1030  Hospital/Surgical Facility: [facility]  Fax number for surgical facility:   Primary Physician: Anastasia Chen  Type of Anesthesia Anticipated: Local    Patient has a Health Care Directive or Living Will:  NO    1. NO - Do you have a history of heart attack, stroke, stent, bypass or surgery on an artery in the head, neck, heart or legs?  2. NO - Do you ever have any pain or discomfort in your chest?  3. NO - Do you have a history of  Heart Failure?  4. NO - Are you troubled by shortness of breath when: walking on the level, up a slight hill or at night?  5. NO - Do you currently have a cold, bronchitis or other respiratory infection?  6. NO - Do you have a cough, shortness of breath or wheezing?  7. NO - Do you sometimes get pains in the calves of your legs when you walk?  8. NO - Do you or anyone in your family have previous history of blood clots?  9. NO - Do you or does anyone in your family have a serious bleeding problem such as prolonged bleeding following surgeries or cuts?  10. NO - Have you ever had problems with anemia or been told to take iron pills?  11. NO - Have you had any abnormal blood loss such as black, tarry or bloody stools, or abnormal vaginal bleeding?  12. NO - Have you ever had a blood transfusion?  13. YES - Have you or any of your relatives ever had problems with anesthesia? Gets nausea every time.   14. NO - Do you have sleep apnea, excessive snoring or daytime  "drowsiness?  15. NO - Do you have any prosthetic heart valves?  16. NO - Do you have prosthetic joints?  17. NO - Is there any chance that you may be pregnant?    Ni Ruvalcaba MA          HPI:                                                      Brief HPI related to upcoming procedure: Teresa is a 40 yo female here today for pre-op. Had been having worsening vision problems and \"fuzziness\" and was evaluated by Dr. Rios at [facility] [place] and was diagnosed with cataracts. R worse than L and completed this eye first. Noticed marked improvement in her vision since so is now pursuing the same for her R eye.      See problem list for active medical problems.  Problems all longstanding and stable, except as noted/documented.  See ROS for pertinent symptoms related to these conditions.    MEDICAL HISTORY:                                                    Patient Active Problem List    Diagnosis Date Noted     Non morbid obesity due to excess calories 09/07/2017     Priority: Medium     Migraine with aura and without status migrainosus, not intractable 04/27/2017     Priority: Medium     Personal history of cervical dysplasia      Priority: Medium     (2009) JOANNA I; (2010) JOANNA I and focal JOANNA II       BMI 34.0-34.9,adult 10/06/2015     Priority: Medium     Menorrhagia 10/06/2015     Priority: Medium     CARDIOVASCULAR SCREENING; LDL GOAL LESS THAN 160 10/31/2010     Priority: Medium      Past Medical History:   Diagnosis Date     Asthma      CARDIOVASCULAR SCREENING; LDL GOAL LESS THAN 160      Cervical dysplasia 2010 2009-colpo @ [facility]-->JOANNA 1; persistent LGSIL/ASC-US paps; colposcopy 12/2010 with JOANNA 1, focal JOANNA 2 and benign ECC     Condyloma acuminatum 4/2009    perirecal BX      Herpes simplex virus infection     HSV-1     Menarche age 15    cycles q 3-4 x 3-4 d w cramps     Menorrhagia      Migraine      Personal history of cervical dysplasia 2010    (2009) JOANNA I; (2010) JOANNA I and focal JOANNA II "     Past Surgical History:   Procedure Laterality Date     ARTHROSCOPIC RECONSTRUCTION ANTERIOR CRUCIATE LIGAMENT Right 10/2010    Reconstruction (ACL, MCL) and fibular fx     COLONOSCOPY       COLONOSCOPY N/A 6/27/2017    Procedure: COLONOSCOPY;;  Surgeon: Allan Olivier MD;  Location:  GI     COLPOSCOPY CERVIX, LOOP ELECTRODE BIOPSY, COMBINED  08/2011    squamous metaplasia     ESOPHAGOSCOPY, GASTROSCOPY, DUODENOSCOPY (EGD), COMBINED       ESOPHAGOSCOPY, GASTROSCOPY, DUODENOSCOPY (EGD), COMBINED N/A 6/27/2017    Procedure: COMBINED ESOPHAGOSCOPY, GASTROSCOPY, DUODENOSCOPY (EGD), BIOPSY SINGLE OR MULTIPLE;  ESOPHAGOSCOPY, GASTROSCOPY, DUODENOSCOPY (EGD with biopsies by cold forcep, Colonoscopy with biopsies by cold forcep.;  Surgeon: Allan Olivier MD;  Location:  GI     HC TOOTH EXTRACTION W/FORCEP  2000    wisdom teeth extraction     LAPAROSCOPY DIAGNOSTIC (GYN)  1992    negative ([facility])     Current Outpatient Prescriptions   Medication Sig Dispense Refill     PROPRANOLOL HCL PO Take 20 mg by mouth 2 times daily       SUMAtriptan (IMITREX) 25 MG tablet Take 1-2 tablets (25-50 mg) by mouth at onset of headache for migraine May repeat in 2 hours. Max 8 tablets/24 hours. 9 tablet 1     OMEPRAZOLE PO Take 20 mg by mouth       ACETAMINOPHEN PO        OTC products: omeprazole    Allergies   Allergen Reactions     Vicodin [Hydrocodone-Acetaminophen] Nausea and Vomiting      Latex Allergy: NO    Social History   Substance Use Topics     Smoking status: Never Smoker     Smokeless tobacco: Never Used     Alcohol use 1.2 oz/week     2 Standard drinks or equivalent per week      Comment: 2 drinks per week avg     History   Drug Use No       REVIEW OF SYSTEMS:                                                    C: NEGATIVE for fever, chills, change in weight  E/M: NEGATIVE for ear, mouth and throat problems  R: NEGATIVE for significant cough or SOB  CV: NEGATIVE for chest pain, palpitations or peripheral  "edema    EXAM:                                                    /78  Pulse 75  Temp 98.4  F (36.9  C) (Oral)  Ht 5' 5\" (1.651 m)  Wt 207 lb (93.9 kg)  SpO2 98%  BMI 34.45 kg/m2  GENERAL APPEARANCE: healthy, alert and no distress  HENT: ear canals and TM's normal and nose and mouth without ulcers or lesions  RESP: lungs clear to auscultation - no rales, rhonchi or wheezes  CV: regular rate and rhythm, normal S1 S2, no S3 or S4 and no murmur, click or rub   ABDOMEN: soft, nontender, no HSM or masses and bowel sounds normal  NEURO: Normal strength and tone, sensory exam grossly normal, mentation intact and speech normal    DIAGNOSTICS:                                                    No labs or EKG required for low risk surgery (cataract, skin procedure, breast biopsy, etc)    Recent Labs   Lab Test  10/23/15   0748  04/07/14   2245  10/23/13   1240   HGB  13.3  13.5   --    PLT  218  227   --    NA   --   139  141   POTASSIUM   --   3.7  5.2   CR   --   0.73  0.74        IMPRESSION:                                                    Reason for surgery/procedure: cataract removal  Diagnosis/reason for consult: pre-op, obesity, migraine     The proposed surgical procedure is considered LOW risk.    REVISED CARDIAC RISK INDEX  The patient has the following serious cardiovascular risks for perioperative complications such as (MI, PE, VFib and 3  AV Block):  No serious cardiac risks  INTERPRETATION: 0 risks: Class I (very low risk - 0.4% complication rate)    The patient has the following additional risks for perioperative complications:  No identified additional risks      ICD-10-CM    1. Preop general physical exam Z01.818    2. Cataract of left eye, unspecified cataract type H26.9    3. Need for influenza vaccination Z23    4. Non morbid obesity due to excess calories E66.09        RECOMMENDATIONS:                                                        APPROVAL GIVEN to proceed with proposed procedure, " without further diagnostic evaluation.       Signed Electronically by: Anastasia Chen PA-C    Copy of this evaluation report is provided to requesting physician.    [facility] Preop Guidelines   no

## 2020-02-13 NOTE — H&P ADULT - NSICDXPASTMEDICALHX_GEN_ALL_CORE_FT
PAST MEDICAL HISTORY:  AICD (automatic cardioverter/defibrillator) present     Arthritis     Atrial Fibrillation paroxismal Dx in 08/2009    Back pain     CAD (Coronary Artery Disease) CARDIAC CATH PTCA/[place] x 3 to SVG to RCA 08/2009    Degenerative disc disease, lumbar     Hypercholesteremia     MI (myocardial infarction)     PAD (peripheral artery disease) r leg 06    Pseudoarthrosis of lumbar spine     Reflux     Stented coronary artery x 3 ESTEVAN

## 2020-02-13 NOTE — H&P ADULT - HISTORY OF PRESENT ILLNESS
59F with PMH/PSH including CAD s/p 4vCABG, MI, AICD, stents x 3 on Plavix and ASA, HLD, pAFib, chronic back pain, T10-L5 posterior spinal fusion for lumbar disc disease with radiculopathy 6/19/14), hip osteoarthritis s/p total hip arthroplasty 7/13/15, s/p open posterior fusion of 2 or more  joints of posterior column of L vertebrae (11/5/15) presents to the ED sent in by Dr. Dupont with a note reading "C spine with gas, implying erosion into esophagus or gas forming bacteria around the screw."  Patient reports that she has been having fevers for two months, intermittently lasting about 8 hours each time and spontaneously resolving.  Reports has been hoarse for many years, reports was sent to ENT, scoped and sent for CT C spine showing that stated result.  Pt had this CT 2 weeks ago but did not have fever so did not feel she had to come in.  Does report neck pain but no numbness weakness to UE's.  No other focal deficit.  Fevers get better and wrose on their own.

## 2020-02-13 NOTE — ED PROVIDER NOTE - PROGRESS NOTE DETAILS
Attending MD Lorenzo: Neurosx bedside, evaluated patient, recommend call to patient's primary surgeon's group Attending MD Lorenzo: Multiple calls to Dr. Mckeon's office  (Stephon Dasilva MD at [facility]).  The group does not come to [facility].  Called Neurosx team, spoke with Dr. Perkins, recommend medicine admission for work up of fever, no acute neurosurgical intervention at this time.  Recommend ID for antibiotic choice. ED Sign Out, eval for CT findings, pending NSGY and ID consults for admission -- Boris Rogel MD

## 2020-02-13 NOTE — ED ADULT NURSE NOTE - PMH
AICD (automatic cardioverter/defibrillator) present    Arthritis    Atrial Fibrillation  paroxismal Dx in 08/2009  Back pain    CAD (Coronary Artery Disease)  CARDIAC CATH PTCA/[place] x 3 to SVG to RCA 08/2009  Degenerative disc disease, lumbar    Hypercholesteremia    MI (myocardial infarction)    PAD (peripheral artery disease)  r leg 06  Pseudoarthrosis of lumbar spine    Reflux    Stented coronary artery  x 3 ESTEVAN

## 2020-02-13 NOTE — CONSULT NOTE ADULT - SUBJECTIVE AND OBJECTIVE BOX
p (1480)     HPI: 59F with PMH/PSH including CAD s/p 4vCABG, MI, AICD, stents x 3 on Plavix and ASA, HLD, pAFib, chronic back pain, T10-L5 posterior spinal fusion for lumbar disc disease with radiculopathy 6/19/14), hip osteoarthritis s/p total hip arthroplasty 7/13/15, s/p open posterior fusion of 2 or more  joints of posterior column of L vertebrae (11/5/15) presents to the ED sent in by Dr. Dupont with a note reading "C spine with gas, implying erosion into esophagus or gas forming bacteria around the screw." Requesting admission to Dr. Zimmerman and Dr. Alva for neurosurgery.  Patient reports that she has been having fevers for two months, intermittently lasting about 8 hours each time and spontaneously resolving.  Reports has been hoarse for many years, reports was sent to ENT, scoped and sent for CT C spine showing that stated result.  Pt had this CT 2 weeks ago but did not have fever so did not feel she had to come in.  Does report neck pain but no numbness weakness to UE's.  No other focal deficit.  Fevers get better and wrose on their own.      Imaging:    Exam: AOx3, FC, PERRL, EOMI, no facial   5/5 throughout, no drift  SILT  no clonus  denies pain at this time    --Anticoagulation:    =====================  PAST MEDICAL HISTORY   AICD (automatic cardioverter/defibrillator) present  Pseudoarthrosis of lumbar spine  Stented coronary artery  Degenerative disc disease, lumbar  PAD (peripheral artery disease)  Hypercholesteremia  Reflux  Back pain  MI (myocardial infarction)  Arthritis  CAD (Coronary Artery Disease)  Atrial Fibrillation    PAST SURGICAL HISTORY   Elective surgery  H/O cervical spine surgery  History of back surgery  Elective surgery  S/P lumbar spine operation  Elective surgery  S/P coronary artery stent placement  S/P coronary artery bypass graft x 4        MEDICATIONS:  Antibiotics:    Neuro:    Other:      SOCIAL HISTORY:   Occupation:   Marital Status:     FAMILY HISTORY:  Family history of heart disease  Family history of coronary arteriosclerosis      ROS: Negative except per HPI    LABS:  PT/INR - ( 13 Feb 2020 15:51 )   PT: 12.3 sec;   INR: 1.08 ratio         PTT - ( 13 Feb 2020 15:51 )  PTT:28.1 sec                        11.9   4.94  )-----------( 265      ( 13 Feb 2020 15:51 )             35.6     02-13    140  |  100  |  14  ----------------------------<  111<H>  3.3<L>   |  25  |  0.78    Ca    9.9      13 Feb 2020 15:51    TPro  8.0  /  Alb  4.3  /  TBili  0.8  /  DBili  x   /  AST  13  /  ALT  9<L>  /  AlkPhos  86  02-13

## 2020-02-13 NOTE — CONSULT NOTE ADULT - ATTENDING COMMENTS
59 F PMHx of CAD, Afib with PPM, and multiple spinal surgeries with hardware who presented to the ED with fevers  Subjective fever, no leukocytosis  Patient very well appearing, does not appear toxic, no complaints aside from neck pain  CT spine with evidence of hardware loosening, also associated air concerning for possible R sided fistula and air/debris  Aside from subjective fever, no signs systemic infection  Despite this, still significant concern for radiographic findings suspicious for infection vs hardware loosening  Overall,  1) Abnormal Finding on Imaging  - Monitor off abx for now  - Would check CT C-spine with contrast if can tolerate contrast  - Check ESR, CRP, PCT, Blood cultures  - Consider GI eval depending on CT findings, ? role for EGD if concern for fistula to esophagus?  2) Hardware Loosening  - F/U neurosurgery  - If not able to tolerate MRI, possible role for tagged WBC  3) Fever  - Monitor for further fevers  - If signs sepsis or worsening, would start Vanco/Zosyn (low threshold)    Discussed with ED team    Mars Davey MD  Pager [phone]  After 5pm and on weekends call [phone]    I was physically present for the key portions of the evaluation and management service provided. I saw and examined the patient. I agree with the above history, physical, and plan except for any discrepancies which I have documented in “Attending Attestation.” Please refer to “Attending Attestation” for final plan. 59 F PMHx of CAD, Afib with PPM, and multiple spinal surgeries with hardware who presented to the ED with fevers  Subjective fever, no leukocytosis  Patient very well appearing, does not appear toxic, no complaints aside from neck pain  CT spine with evidence of hardware loosening, also associated air concerning for possible R sided fistula and air/debris  Aside from subjective fever, no signs systemic infection  Despite this, still significant concern for radiographic findings suspicious for infection vs hardware loosening  Overall,  1) Abnormal Finding on Imaging  - Monitor off abx for now  - Would check CT C-spine with contrast if can tolerate contrast  - Check ESR, CRP, PCT, Blood cultures  - Consider GI eval depending on CT findings, ? role for EGD if concern for fistula to esophagus?  2) Hardware Loosening  - F/U neurosurgery  - If not able to tolerate MRI, possible role for tagged WBC  3) Fever  - Monitor for further fevers  - If signs sepsis or worsening, would start Vanco/Zosyn (low threshold)  4) Abnormal CXR  - I reviewed personally, note R sided opacities  - F/U Flu/RSV swab    Discussed with ED team    Mars Davey MD  Pager [phone]  After 5pm and on weekends call [phone]    I was physically present for the key portions of the evaluation and management service provided. I saw and examined the patient. I agree with the above history, physical, and plan except for any discrepancies which I have documented in “Attending Attestation.” Please refer to “Attending Attestation” for final plan.

## 2020-02-13 NOTE — ED ADULT NURSE NOTE - ED STAT RN HANDOFF DETAILS
Bedside report given to on coming nurse Joan. Understands pmh, medications given and plan of care for patient. Patient in stable condition, vital signs updated, has no complaints at this time and has been updated on care plan. Explained to patient that it is change of shift and new nurse is taking over, pt verbalized understanding.

## 2020-02-13 NOTE — ED PROVIDER NOTE - ATTENDING CONTRIBUTION TO CARE
Attending MD Lorenzo:   I personally have seen and examined this patient.  Physician assistant note reviewed and agree on plan of care and except where noted.  See below for details.     Seen in [facility], accompanied by     59F with PMH/PSH including CAD s/p 4vCABG, MI, AICD, stents x 3 on Plavix and ASA, HLD, pAFib, chronic back pain, T10-L5 posterior spinal fusion for lumbar disc disease with radiculopathy 6/19/14), hip osteoarthritis s/p total hip arthroplasty 7/13/15, s/p open posterior fusion of 2 or more  joints of posterior column of L vertebrae (11/5/15) presents to the ED sent in by Dr. Dupont with a note reading "C spine with gas, implying erosion into esophagus or gas forming bacteria around the screw." Requesting admission to Dr. Zimmerman and Dr. Alva for neurosurgery.  Patient reports that she has been having fevers for two months.  Reports that she attributed it to the flu shot which she had in ?September and then after that has been having intermittent fevers.  Reports saw pulmonary and PMD who did blood work including testing for TB which was all negative.  Reports had CT chest in August (reports normal), in November (?"spot on the lung"), reports then placed on antibiotics, repeated CT chest in Decemeber ("spot was shrinking").  Reports was placed on antibiotics in the interim.  Reports has been hoarse for many years, reports was sent to ENT, scoped and sent for CT C spine.  Denies abdominal pain, nausea, vomiting, diarrhea, blood in stools. Denies dysuria, hematuria, change in urinary habits including frequency, urgency. Denies hemoptysis.  Reports occasional anterior chest pain, denies at present.  Reports last fever was Tuesday 103.9.  Reports last antibiotics was about a month ago.  On exam, NAD, head NCAT, PERRL, FROM at neck, no tenderness to midline palpation, no stepoffs along length of spine, well healed midline vertical scar from lower T to LS spine midline, lungs CTAB with good inspiratory effort, no wheezing, no rhonchi, no rales, well healed median sternotomy, +S1S2, no m/r/g, abdomen soft with +BS, NT, ND, no CVAT, moving all extremities, good and equal  strength bilaterally, no saddle anesthesia, sensory grossly intact, 5/5 dorsi and plantar flexion, can lift and lower against resistance but 4+ not 5; A/P: 59F sent in for concern for hardware infection in C spine, will obtain preop labs, CXR, EKG, spine consult, pain control, reassess

## 2020-02-13 NOTE — ED PROVIDER NOTE - PMH
AICD (automatic cardioverter/defibrillator) present    Arthritis    Atrial Fibrillation  paroxismal Dx in 08/2009  Back pain    CAD (Coronary Artery Disease)  CARDIAC CATH PTCA/[place] x 3 to SVG to RCA 08/2009  Degenerative disc disease, lumbar    Hypercholesteremia    MI (myocardial infarction)    PAD (peripheral artery disease)  r leg 06  Pseudoarthrosis of lumbar spine    Reflux    Stented coronary artery  x 3 ESTEVAN

## 2020-02-14 DIAGNOSIS — M54.2 CERVICALGIA: ICD-10-CM

## 2020-02-14 LAB
ANION GAP SERPL CALC-SCNC: 14 MMOL/L — SIGNIFICANT CHANGE UP (ref 5–17)
BASOPHILS # BLD AUTO: 0.04 K/UL — SIGNIFICANT CHANGE UP (ref 0–0.2)
BASOPHILS NFR BLD AUTO: 0.7 % — SIGNIFICANT CHANGE UP (ref 0–2)
BUN SERPL-MCNC: 22 MG/DL — SIGNIFICANT CHANGE UP (ref 7–23)
CALCIUM SERPL-MCNC: 9.9 MG/DL — SIGNIFICANT CHANGE UP (ref 8.4–10.5)
CHLORIDE SERPL-SCNC: 99 MMOL/L — SIGNIFICANT CHANGE UP (ref 96–108)
CO2 SERPL-SCNC: 25 MMOL/L — SIGNIFICANT CHANGE UP (ref 22–31)
CREAT SERPL-MCNC: 1.32 MG/DL — HIGH (ref 0.5–1.3)
CULTURE RESULTS: NO GROWTH — SIGNIFICANT CHANGE UP
EOSINOPHIL # BLD AUTO: 0.17 K/UL — SIGNIFICANT CHANGE UP (ref 0–0.5)
EOSINOPHIL NFR BLD AUTO: 2.9 % — SIGNIFICANT CHANGE UP (ref 0–6)
GLUCOSE SERPL-MCNC: 101 MG/DL — HIGH (ref 70–99)
HCT VFR BLD CALC: 33 % — LOW (ref 34.5–45)
HGB BLD-MCNC: 11.2 G/DL — LOW (ref 11.5–15.5)
IMM GRANULOCYTES NFR BLD AUTO: 0.2 % — SIGNIFICANT CHANGE UP (ref 0–1.5)
LYMPHOCYTES # BLD AUTO: 1.87 K/UL — SIGNIFICANT CHANGE UP (ref 1–3.3)
LYMPHOCYTES # BLD AUTO: 32.3 % — SIGNIFICANT CHANGE UP (ref 13–44)
MCHC RBC-ENTMCNC: 33.9 GM/DL — SIGNIFICANT CHANGE UP (ref 32–36)
MCHC RBC-ENTMCNC: 34.9 PG — HIGH (ref 27–34)
MCV RBC AUTO: 102.8 FL — HIGH (ref 80–100)
MONOCYTES # BLD AUTO: 0.51 K/UL — SIGNIFICANT CHANGE UP (ref 0–0.9)
MONOCYTES NFR BLD AUTO: 8.8 % — SIGNIFICANT CHANGE UP (ref 2–14)
NEUTROPHILS # BLD AUTO: 3.19 K/UL — SIGNIFICANT CHANGE UP (ref 1.8–7.4)
NEUTROPHILS NFR BLD AUTO: 55.1 % — SIGNIFICANT CHANGE UP (ref 43–77)
NRBC # BLD: 0 /100 WBCS — SIGNIFICANT CHANGE UP (ref 0–0)
PLATELET # BLD AUTO: 265 K/UL — SIGNIFICANT CHANGE UP (ref 150–400)
POTASSIUM SERPL-MCNC: 3.7 MMOL/L — SIGNIFICANT CHANGE UP (ref 3.5–5.3)
POTASSIUM SERPL-SCNC: 3.7 MMOL/L — SIGNIFICANT CHANGE UP (ref 3.5–5.3)
RBC # BLD: 3.21 M/UL — LOW (ref 3.8–5.2)
RBC # FLD: 13.7 % — SIGNIFICANT CHANGE UP (ref 10.3–14.5)
SODIUM SERPL-SCNC: 138 MMOL/L — SIGNIFICANT CHANGE UP (ref 135–145)
SPECIMEN SOURCE: SIGNIFICANT CHANGE UP
WBC # BLD: 5.79 K/UL — SIGNIFICANT CHANGE UP (ref 3.8–10.5)
WBC # FLD AUTO: 5.79 K/UL — SIGNIFICANT CHANGE UP (ref 3.8–10.5)

## 2020-02-14 PROCEDURE — 74220 X-RAY XM ESOPHAGUS 1CNTRST: CPT | Mod: 26

## 2020-02-14 PROCEDURE — 99223 1ST HOSP IP/OBS HIGH 75: CPT | Mod: 25

## 2020-02-14 PROCEDURE — 31575 DIAGNOSTIC LARYNGOSCOPY: CPT | Mod: GC

## 2020-02-14 PROCEDURE — 99232 SBSQ HOSP IP/OBS MODERATE 35: CPT

## 2020-02-14 PROCEDURE — 71250 CT THORAX DX C-: CPT | Mod: 26

## 2020-02-14 PROCEDURE — 99222 1ST HOSP IP/OBS MODERATE 55: CPT | Mod: GC

## 2020-02-14 RX ORDER — ACYCLOVIR 50 MG/G
1 OINTMENT TOPICAL ONCE
Refills: 0 | Status: COMPLETED | OUTPATIENT
Start: 2020-02-14 | End: 2020-02-14

## 2020-02-14 RX ORDER — SODIUM CHLORIDE 9 MG/ML
1000 INJECTION INTRAMUSCULAR; INTRAVENOUS; SUBCUTANEOUS
Refills: 0 | Status: DISCONTINUED | OUTPATIENT
Start: 2020-02-14 | End: 2020-02-25

## 2020-02-14 RX ADMIN — Medication 50 MILLIGRAM(S): at 11:12

## 2020-02-14 RX ADMIN — CLOPIDOGREL BISULFATE 75 MILLIGRAM(S): 75 TABLET, FILM COATED ORAL at 11:10

## 2020-02-14 RX ADMIN — OXYCODONE HYDROCHLORIDE 30 MILLIGRAM(S): 5 TABLET ORAL at 16:22

## 2020-02-14 RX ADMIN — Medication 325 MILLIGRAM(S): at 11:10

## 2020-02-14 RX ADMIN — ACYCLOVIR 1 APPLICATION(S): 50 OINTMENT TOPICAL at 11:15

## 2020-02-14 RX ADMIN — HEPARIN SODIUM 5000 UNIT(S): 5000 INJECTION INTRAVENOUS; SUBCUTANEOUS at 05:49

## 2020-02-14 RX ADMIN — OXYCODONE HYDROCHLORIDE 30 MILLIGRAM(S): 5 TABLET ORAL at 08:55

## 2020-02-14 RX ADMIN — HEPARIN SODIUM 5000 UNIT(S): 5000 INJECTION INTRAVENOUS; SUBCUTANEOUS at 17:30

## 2020-02-14 RX ADMIN — Medication 10 MILLIGRAM(S): at 00:09

## 2020-02-14 RX ADMIN — SODIUM CHLORIDE 75 MILLILITER(S): 9 INJECTION INTRAMUSCULAR; INTRAVENOUS; SUBCUTANEOUS at 21:33

## 2020-02-14 RX ADMIN — OXYCODONE HYDROCHLORIDE 30 MILLIGRAM(S): 5 TABLET ORAL at 16:58

## 2020-02-14 RX ADMIN — OXYCODONE HYDROCHLORIDE 30 MILLIGRAM(S): 5 TABLET ORAL at 23:35

## 2020-02-14 RX ADMIN — OXYCODONE HYDROCHLORIDE 30 MILLIGRAM(S): 5 TABLET ORAL at 03:40

## 2020-02-14 RX ADMIN — Medication 40 MILLIGRAM(S): at 11:10

## 2020-02-14 RX ADMIN — OXYCODONE HYDROCHLORIDE 30 MILLIGRAM(S): 5 TABLET ORAL at 03:09

## 2020-02-14 RX ADMIN — LOSARTAN POTASSIUM 50 MILLIGRAM(S): 100 TABLET, FILM COATED ORAL at 05:48

## 2020-02-14 RX ADMIN — OXYCODONE HYDROCHLORIDE 30 MILLIGRAM(S): 5 TABLET ORAL at 23:04

## 2020-02-14 RX ADMIN — Medication 10 MILLIGRAM(S): at 05:52

## 2020-02-14 RX ADMIN — ATORVASTATIN CALCIUM 40 MILLIGRAM(S): 80 TABLET, FILM COATED ORAL at 21:28

## 2020-02-14 RX ADMIN — PANTOPRAZOLE SODIUM 40 MILLIGRAM(S): 20 TABLET, DELAYED RELEASE ORAL at 05:48

## 2020-02-14 RX ADMIN — OXYCODONE HYDROCHLORIDE 30 MILLIGRAM(S): 5 TABLET ORAL at 09:25

## 2020-02-14 NOTE — PROGRESS NOTE ADULT - SUBJECTIVE AND OBJECTIVE BOX
CC: F/U for Abnormal finding on imaging    Saw/spoke to patient. No fevers, no chills. Patient unchanged. Still pain in neck.    Allergies  No Known Allergies    ANTIMICROBIALS:  Off    PE:    Vital Signs Last 24 Hrs  T(C): 36.7 (2020 11:10), Max: 37 (2020 20:44)  T(F): 98 (2020 11:10), Max: 98.6 (2020 20:44)  HR: 62 (2020 11:10) (60 - 72)  BP: 112/69 (2020 11:10) (96/60 - 133/72)  RR: 16 (2020 11:10) (16 - 18)  SpO2: 96% (2020 11:10) (95% - 98%)    Gen: AOx3, NAD, non-toxic  Msk: No pain to percussion  CV: S1+S2 normal, nontachycardic  Resp: Clear bilat, no resp distress, no crackles/wheezes  Abd: Soft, nontender, +BS  Ext: No LE edema, no wounds    LABS:                        11.9   4.94  )-----------( 265      ( 2020 15:51 )             35.6     02-13    140  |  100  |  14  ----------------------------<  111<H>  3.3<L>   |  25  |  0.78    Ca    9.9      2020 15:51    TPro  8.0  /  Alb  4.3  /  TBili  0.8  /  DBili  x   /  AST  13  /  ALT  9<L>  /  AlkPhos  86  02-13    Urinalysis Basic - ( 2020 16:36 )    Color: Yellow / Appearance: Clear / S.017 / pH: x  Gluc: x / Ketone: Negative  / Bili: Negative / Urobili: Negative   Blood: x / Protein: Negative / Nitrite: Negative   Leuk Esterase: Large / RBC: 1 /hpf / WBC 30 /HPF   Sq Epi: x / Non Sq Epi: 6 /hpf / Bacteria: Negative    MICROBIOLOGY:    Viral Swab neg    RADIOLOGY:     CT:    IMPRESSION:    No significant interval change in appearance of fractured right C5 vertebral body screw. The fractured screw fragment is embedded in the right prevertebral soft tissues. Adjacent to this, there is a similar appearing focus of retropharyngeal air asymmetric to the right at the C5 and C6 levels. This could represent the presence of esophageal injury.    Previously seen 7 mm nodule along the superior aspect of the scarring appears slightly increased in size, currently measuring 8 mm in greatest dimension. Correlation with CT chest is recommended for further evaluation.

## 2020-02-14 NOTE — PROGRESS NOTE ADULT - SUBJECTIVE AND OBJECTIVE BOX
Patient is a 59y old  Female who presents with a chief complaint of abnormal CT C spine (2020 10:10)      SUBJECTIVE / OVERNIGHT EVENTS: No new complaints.  at bedside.  Review of Systems  chest pain no  palpitations no  sob no  nausea no  headache no    MEDICATIONS  (STANDING):  aspirin 325 milliGRAM(s) Oral daily  atorvastatin 40 milliGRAM(s) Oral at bedtime  clopidogrel Tablet 75 milliGRAM(s) Oral daily  furosemide    Tablet 40 milliGRAM(s) Oral daily  heparin  Injectable 5000 Unit(s) SubCutaneous every 12 hours  losartan 50 milliGRAM(s) Oral two times a day  metoprolol succinate ER 50 milliGRAM(s) Oral two times a day  pantoprazole    Tablet 40 milliGRAM(s) Oral before breakfast  sodium chloride 0.9%. 1000 milliLiter(s) (75 mL/Hr) IV Continuous <Continuous>    MEDICATIONS  (PRN):  diazepam    Tablet 10 milliGRAM(s) Oral four times a day PRN anxiety  oxyCODONE    IR 30 milliGRAM(s) Oral four times a day PRN Severe Pain (7 - 10)      Vital Signs Last 24 Hrs  T(C): 36.4 (2020 20:25), Max: 36.8 (2020 02:08)  T(F): 97.5 (2020 20:25), Max: 98.2 (2020 02:08)  HR: 64 (2020 20:25) (60 - 71)  BP: 94/65 (2020 20:25) (93/57 - 120/75)  BP(mean): --  RR: 16 (2020 20:25) (16 - 18)  SpO2: 95% (2020 20:25) (95% - 98%)    PHYSICAL EXAM:  GENERAL: NAD  HEAD:  Atraumatic, Normocephalic  EYES: EOMI, PERRLA, conjunctiva and sclera clear  NECK: Supple, No JVD  CHEST/LUNG: Clear to auscultation bilaterally; No wheeze  HEART: Regular rate and rhythm; No murmurs, rubs, or gallops  ABDOMEN: Soft, Nontender, Nondistended; Bowel sounds present  EXTREMITIES:  2+ Peripheral Pulses, No clubbing, cyanosis, or edema  PSYCH: AAOx3  NEUROLOGY: non-focal  SKIN: No rashes or lesions    LABS:                        11.2   5.79  )-----------( 265      ( 2020 17:22 )             33.0     02-14    138  |  99  |  22  ----------------------------<  101<H>  3.7   |  25  |  1.32<H>    Ca    9.9      2020 17:22    TPro  8.0  /  Alb  4.3  /  TBili  0.8  /  DBili  x   /  AST  13  /  ALT  9<L>  /  AlkPhos  86  -13    PT/INR - ( 2020 15:51 )   PT: 12.3 sec;   INR: 1.08 ratio         PTT - ( 2020 15:51 )  PTT:28.1 sec      Urinalysis Basic - ( 2020 16:36 )    Color: Yellow / Appearance: Clear / S.017 / pH: x  Gluc: x / Ketone: Negative  / Bili: Negative / Urobili: Negative   Blood: x / Protein: Negative / Nitrite: Negative   Leuk Esterase: Large / RBC: 1 /hpf / WBC 30 /HPF   Sq Epi: x / Non Sq Epi: 6 /hpf / Bacteria: Negative        Culture - Blood (collected 2020 20:46)  Source: .Blood Blood-Peripheral  Preliminary Report (2020 21:01):    No growth to date.    Culture - Blood (collected 2020 20:45)  Source: .Blood Blood-Peripheral  Preliminary Report (2020 21:01):    No growth to date.    Culture - Urine (collected 2020 18:08)  Source: .Urine Clean Catch (Midstream)  Final Report (2020 16:06):    No growth        RADIOLOGY & ADDITIONAL TESTS:    Imaging Personally Reviewed:  < from: Xray Esophagram Single Contrast (20 @ 18:22) >  IMPRESSION:  Unremarkable esophagram.    No extravasation of contrast to suggest esophageal perforation.    < end of copied text >  < from: CT Chest No Cont (20 @ 13:44) >  IMPRESSION:     1.  Patchy groundglass opacities in the right upper lobe, right middle lobe, and the right lower lobe are new since prior exam. These findings are nonspecific. Recommend follow-up in 6 - 8 weeks.    < end of copied text >  < from: CT Neck Soft Tissue w/ IV Cont (20 @ 21:03) >    IMPRESSION:    No significant interval change in appearance of fractured right C5 vertebral body screw. The fractured screw fragment is embedded in the right prevertebral soft tissues. Adjacent to this, there is a similar appearing focus of retropharyngeal air asymmetric to the right at the C5 and C6 levels. This could represent the presence of esophageal injury.    Previously seen 7 mm nodule along the superior aspect of the scarring appears slightly increased in size, currently measuring 8 mm in greatest dimension. Correlation with CT chest is recommended for further evaluation.    < end of copied text >    Consultant(s) Notes Reviewed:      Care Discussed with Consultants/Other Providers:

## 2020-02-14 NOTE — CONSULT NOTE ADULT - PROBLEM SELECTOR RECOMMENDATION 9
- Continue Antibiotics per primary team  - F/U Neurosurgery recommendations - no intervention at this time  - discussed with Dr. Steward - will decide if further imaging study needed at this time  - ENT to continue to follow - Please consult Ortho spine - Dr. Boris Ivey for hardware removal  - Please obtain Esophagram to r/o leak  - Keep NPO for now  - ENT to continue to follow - Please consult Ortho spine - Dr. Boris Ivey for hardware removal  - Please obtain Esophagram to r/o leak, if negative consult GI for endoscopy.   - Keep NPO for now  - ENT to continue to follow - Please consult Ortho spine - Dr. Boris Ivey for hardware removal  - Please obtain Esophagram to r/o leak, if negative consult GI for endoscopy.   - OK to continue with Full liquid diet.   - ENT to continue to follow

## 2020-02-14 NOTE — PROGRESS NOTE ADULT - ASSESSMENT
Paula, Lela  59F PMHx CAD s/p 4vCABG, MI, AICD, stents x3 on ASA plavix, HLD, afib, back pain s/p C5-C6 ACDF (2002) and T10-P fusion, sent in my PMD after ENT scoped and ordered CT c-spine which shows hardware and fusion failure at C5 with air around hardware, adjacent to esophagus. She has had intermittent fevers for 4 months but otherwise no complaints. CT L shows fused T10-P construct. Exam: neurointact, denies pain.  - No acute neurosurgical intervention  - ENT consult  - CT neck w/wo as AICD not MR compatible

## 2020-02-14 NOTE — PROGRESS NOTE ADULT - ASSESSMENT
59 F PMHx of CAD, Afib with PPM, and multiple spinal surgeries with hardware who presented to the ED with fevers  Subjective fever, no leukocytosis  Patient very well appearing, does not appear toxic, no complaints aside from neck pain  CT spine with evidence of hardware loosening, also associated air concerning for possible R sided fistula and air/debris  Aside from subjective fever, no signs systemic infection  Despite this, still significant concern for radiographic findings suspicious for infection vs hardware loosening  New CT with no significant change  Uncertain infection presently considering stability  Overall,  1) Abnormal Finding on Imaging  - Monitor off abx for now  - Check ESR, CRP, PCT, Blood cultures  - Consider GI eval ? role for EGD if concern for fistula/injury to esophagus?  2) Hardware Loosening  - F/U neurosurgery  - If not able to tolerate MRI, possible role for tagged WBC  3) Fever  - Monitor for further fevers  - If signs sepsis or worsening, would start Vanco/Zosyn (low threshold)  4) Abnormal CXR  - I reviewed personally, note R sided opacities  - Flu/RSV neg    Mars Davey MD  Pager [phone]  After 5pm and on weekends call [phone]

## 2020-02-14 NOTE — CONSULT NOTE ADULT - SUBJECTIVE AND OBJECTIVE BOX
Chief Complaint:  Patient is a 59y old  Female who presents with a chief complaint of abnormal CT C spine (2020 05:44)      HPI:  59 year old female with CAD s/p 4vCABG, MI, AICD, stents x 3 (on Plavix and ASA), HLD, pAFib, chronic back pain, T10-L5 posterior spinal fusion for lumbar disc disease with radiculopathy 14), hip osteoarthritis s/p total hip arthroplasty 7/13/15, s/p open posterior fusion of 2 or more  joints of posterior column of L vertebrae (11/5/15) presents to the ED sent in by Dr. Duopnt with a note reading "C spine with gas, implying erosion into esophagus or gas forming bacteria around the screw."     Patient reports that she has been having fevers for two months, intermittently lasting about 8 hours each time and spontaneously resolving. She has been hoarse for many years, reports was sent to ENT, scoped and sent for CT C spine showing that stated result.  She had this CT 2 weeks ago but did not have fever so did not feel she had to come in.  She reports neck pain but no numbness weakness to UE's.     Allergies:  No Known Allergies      Home Medications:  Patient Currently Takes Medications as of 2020 18:29 documented in Structured Notes  · 	Plavix 75 mg oral tablet: Last Dose Taken:  , 1 tab(s) orally once a day  · 	aspirin 325 mg oral tablet: Last Dose Taken:  , 1 tab(s) orally once a day  · 	Metoprolol Succinate ER 50 mg oral tablet, extended release: Last Dose Taken: 2020 AM, 1 tab(s) orally 2 times a day  · 	oxyCODONE 30 mg oral tablet: Last Dose Taken:  , 1 tab(s) orally 4 times a day  · 	diazePAM 10 mg oral tablet: Last Dose Taken:  , 1 tab(s) orally 4 times a day, As Needed  · 	pantoprazole 40 mg oral delayed release tablet: Last Dose Taken:  , 1 tab(s) orally once a day  · 	ezetimibe 10 mg oral tablet: Last Dose Taken:  , 1 tab(s) orally once a day  · 	losartan 50 mg oral tablet: Last Dose Taken:  , 1 tab(s) orally 2 times a day  · 	atorvastatin 40 mg oral tablet: Last Dose Taken:  , 1 tab(s) orally once a day  · 	furosemide 40 mg oral tablet: Last Dose Taken:  , 1 tab(s) orally once a day    Hospital Medications:  acyclovir Topical 5% Ointment 1 Application(s) Topical once  aspirin 325 milliGRAM(s) Oral daily  atorvastatin 40 milliGRAM(s) Oral at bedtime  clopidogrel Tablet 75 milliGRAM(s) Oral daily  diazepam    Tablet 10 milliGRAM(s) Oral four times a day PRN  furosemide    Tablet 40 milliGRAM(s) Oral daily  heparin  Injectable 5000 Unit(s) SubCutaneous every 12 hours  losartan 50 milliGRAM(s) Oral two times a day  metoprolol succinate ER 50 milliGRAM(s) Oral two times a day  oxyCODONE    IR 30 milliGRAM(s) Oral four times a day PRN  pantoprazole    Tablet 40 milliGRAM(s) Oral before breakfast      PMHX/PSHX:  AICD (automatic cardioverter/defibrillator) present  Pseudoarthrosis of lumbar spine  Stented coronary artery  Degenerative disc disease, lumbar  PAD (peripheral artery disease)  Hypercholesteremia  Reflux  Back pain  MI (myocardial infarction)  Arthritis  S/P coronary artery stent placement  S/P coronary artery bypass graft x 4  CAD (Coronary Artery Disease)  Atrial Fibrillation  Elective surgery  H/O cervical spine surgery  History of back surgery  Elective surgery  S/P lumbar spine operation  Elective surgery  S/P coronary artery stent placement  S/P coronary artery bypass graft x 4      Family history:  Family history of heart disease  Family history of coronary arteriosclerosis      Social History:   Denies any toxic habits     ROS:     General:  No wt loss, fevers, chills, night sweats, fatigue,   Eyes:  Good vision, no reported pain  ENT:  No sore throat, pain, runny nose, dysphagia  CV:  No pain, palpitations, hypo/hypertension  Resp:  No dyspnea, cough, tachypnea, wheezing  GI:  See HPI  :  No pain, bleeding, incontinence, nocturia  Muscle:  No pain, weakness  Neuro:  No weakness, tingling, memory problems  Psych:  No fatigue, insomnia, mood problems, depression  Endocrine:  No polyuria, polydipsia, cold/heat intolerance  Heme:  No petechiae, ecchymosis, easy bruisability  Skin:  No rash, edema      PHYSICAL EXAM:     GENERAL:  Appears stated age  HEENT:  NC/AT  CHEST:  Full & symmetric excursion  HEART:  Regular rhythm, S1, S2  ABDOMEN:  Soft, non-tender, non-distended  EXTREMITIES:  no edema  SKIN:  No rash  NEURO:  Alert, oriented    Vital Signs:  Vital Signs Last 24 Hrs  T(C): 36.8 (2020 04:58), Max: 37 (2020 20:44)  T(F): 98.2 (2020 04:58), Max: 98.6 (2020 20:44)  HR: 65 (2020 04:58) (60 - 72)  BP: 103/64 (2020 04:58) (103/64 - 133/72)  BP(mean): --  RR: 18 (2020 04:58) (16 - 18)  SpO2: 95% (2020 04:58) (95% - 98%)  Daily Height in cm: 152.4 (2020 12:57)    Daily     LABS:                        11.9   4.94  )-----------( 265      ( 2020 15:51 )             35.6     02-13    140  |  100  |  14  ----------------------------<  111<H>  3.3<L>   |  25  |  0.78    Ca    9.9      2020 15:51    TPro  8.0  /  Alb  4.3  /  TBili  0.8  /  DBili  x   /  AST  13  /  ALT  9<L>  /  AlkPhos  86  02-13    LIVER FUNCTIONS - ( 2020 15:51 )  Alb: 4.3 g/dL / Pro: 8.0 g/dL / ALK PHOS: 86 U/L / ALT: 9 U/L / AST: 13 U/L / GGT: x           PT/INR - ( 2020 15:51 )   PT: 12.3 sec;   INR: 1.08 ratio         PTT - ( 2020 15:51 )  PTT:28.1 sec  Urinalysis Basic - ( 2020 16:36 )    Color: Yellow / Appearance: Clear / S.017 / pH: x  Gluc: x / Ketone: Negative  / Bili: Negative / Urobili: Negative   Blood: x / Protein: Negative / Nitrite: Negative   Leuk Esterase: Large / RBC: 1 /hpf / WBC 30 /HPF   Sq Epi: x / Non Sq Epi: 6 /hpf / Bacteria: Negative      Imaging:    < from: CT 3D Reconstruct w/ Workstation (20 @ 18:32) >  IMPRESSION:   Status post ACDF at C5/C6 with redemonstration of fractured right C5 screw. Lucency about the bilateral C5 screws and mild anterior displacement of the superior endplate of the hardware.  Pocket of air/debris adjacent to the right side of the fixation plate and the retropharyngeal soft tissues, which may represent a fistulous connection anteriorly to the esophagus or airway though no definitive connection is visualized. Underlying infection is a consideration. Retropharyngeal air contacts the hardware, concerning for hardware infection.  Interval increase in groundglass opacity surrounding the right upper lobe airspace opacity, recommend dedicated chest CT.    Findings discussed with SUSAN Huizar on 2/3/2020 at 11:00 AM.    < end of copied text > Chief Complaint:  Patient is a 59y old  Female who presents with a chief complaint of abnormal CT C spine (2020 05:44)      HPI:  59 year old female with CAD s/p 4vCABG, MI, AICD, stents x 3 (on Plavix and ASA), HLD, pAFib, chronic back pain, T10-L5 posterior spinal fusion for lumbar disc disease with radiculopathy 14), hip osteoarthritis s/p total hip arthroplasty 7/13/15, s/p open posterior fusion of 2 or more  joints of posterior column of L vertebrae (11/5/15) presents to the ED sent in by Dr. Dupont with a note reading "C spine with gas, implying erosion into esophagus or gas forming bacteria around the screw."     Patient reports that she has been having fevers for two months, intermittently lasting about 8 hours each time and spontaneously resolving. She has been hoarse for many years, reports was sent to ENT, scoped and sent for CT C spine showing that stated result.  She had this CT 2 weeks ago but did not have fever so did not feel she had to come in.  She reports neck pain but no numbness weakness to UE's.     GI consulted to evaluate the patient for a possible esophageal fistula. Patient reports no dysphagia or odynophagia. She does have regurgitation of food at time, since many years. She denies food getting stuck in her esophagus. She had an endoscopy many years ago and was reportedly normal.     Allergies:  No Known Allergies      Home Medications:  Patient Currently Takes Medications as of 2020 18:29 documented in Structured Notes  · 	Plavix 75 mg oral tablet: Last Dose Taken:  , 1 tab(s) orally once a day  · 	aspirin 325 mg oral tablet: Last Dose Taken:  , 1 tab(s) orally once a day  · 	Metoprolol Succinate ER 50 mg oral tablet, extended release: Last Dose Taken: 2020 AM, 1 tab(s) orally 2 times a day  · 	oxyCODONE 30 mg oral tablet: Last Dose Taken:  , 1 tab(s) orally 4 times a day  · 	diazePAM 10 mg oral tablet: Last Dose Taken:  , 1 tab(s) orally 4 times a day, As Needed  · 	pantoprazole 40 mg oral delayed release tablet: Last Dose Taken:  , 1 tab(s) orally once a day  · 	ezetimibe 10 mg oral tablet: Last Dose Taken:  , 1 tab(s) orally once a day  · 	losartan 50 mg oral tablet: Last Dose Taken:  , 1 tab(s) orally 2 times a day  · 	atorvastatin 40 mg oral tablet: Last Dose Taken:  , 1 tab(s) orally once a day  · 	furosemide 40 mg oral tablet: Last Dose Taken:  , 1 tab(s) orally once a day    Hospital Medications:  acyclovir Topical 5% Ointment 1 Application(s) Topical once  aspirin 325 milliGRAM(s) Oral daily  atorvastatin 40 milliGRAM(s) Oral at bedtime  clopidogrel Tablet 75 milliGRAM(s) Oral daily  diazepam    Tablet 10 milliGRAM(s) Oral four times a day PRN  furosemide    Tablet 40 milliGRAM(s) Oral daily  heparin  Injectable 5000 Unit(s) SubCutaneous every 12 hours  losartan 50 milliGRAM(s) Oral two times a day  metoprolol succinate ER 50 milliGRAM(s) Oral two times a day  oxyCODONE    IR 30 milliGRAM(s) Oral four times a day PRN  pantoprazole    Tablet 40 milliGRAM(s) Oral before breakfast      PMHX/PSHX:  AICD (automatic cardioverter/defibrillator) present  Pseudoarthrosis of lumbar spine  Stented coronary artery  Degenerative disc disease, lumbar  PAD (peripheral artery disease)  Hypercholesteremia  Reflux  Back pain  MI (myocardial infarction)  Arthritis  S/P coronary artery stent placement  S/P coronary artery bypass graft x 4  CAD (Coronary Artery Disease)  Atrial Fibrillation  Elective surgery  H/O cervical spine surgery  History of back surgery  Elective surgery  S/P lumbar spine operation  Elective surgery  S/P coronary artery stent placement  S/P coronary artery bypass graft x 4      Family history:  Family history of heart disease  Family history of coronary arteriosclerosis      Social History:   Denies any toxic habits     ROS:     General:  No wt loss, fevers, chills, night sweats, fatigue,   Eyes:  Good vision, no reported pain  ENT:  No sore throat, pain, runny nose, dysphagia  CV:  No pain, palpitations, hypo/hypertension  Resp:  No dyspnea, cough, tachypnea, wheezing  GI:  See HPI  :  No pain, bleeding, incontinence, nocturia  Muscle:  No pain, weakness  Neuro:  No weakness, tingling, memory problems  Psych:  No fatigue, insomnia, mood problems, depression  Endocrine:  No polyuria, polydipsia, cold/heat intolerance  Heme:  No petechiae, ecchymosis, easy bruisability  Skin:  No rash, edema      PHYSICAL EXAM:     GENERAL:  Appears stated age  HEENT:  NC/AT  CHEST:  Full & symmetric excursion  HEART:  Regular rhythm, S1, S2  ABDOMEN:  Soft, non-tender, non-distended  EXTREMITIES:  no edema  SKIN:  No rash  NEURO:  Alert, oriented    Vital Signs:  Vital Signs Last 24 Hrs  T(C): 36.8 (2020 04:58), Max: 37 (2020 20:44)  T(F): 98.2 (2020 04:58), Max: 98.6 (2020 20:44)  HR: 65 (2020 04:58) (60 - 72)  BP: 103/64 (2020 04:58) (103/64 - 133/72)  BP(mean): --  RR: 18 (2020 04:58) (16 - 18)  SpO2: 95% (2020 04:58) (95% - 98%)  Daily Height in cm: 152.4 (2020 12:57)    Daily     LABS:                        11.9   4.94  )-----------( 265      ( 2020 15:51 )             35.6     02-13    140  |  100  |  14  ----------------------------<  111<H>  3.3<L>   |  25  |  0.78    Ca    9.9      2020 15:51    TPro  8.0  /  Alb  4.3  /  TBili  0.8  /  DBili  x   /  AST  13  /  ALT  9<L>  /  AlkPhos  86  02-13    LIVER FUNCTIONS - ( 2020 15:51 )  Alb: 4.3 g/dL / Pro: 8.0 g/dL / ALK PHOS: 86 U/L / ALT: 9 U/L / AST: 13 U/L / GGT: x           PT/INR - ( 2020 15:51 )   PT: 12.3 sec;   INR: 1.08 ratio         PTT - ( 2020 15:51 )  PTT:28.1 sec  Urinalysis Basic - ( 2020 16:36 )    Color: Yellow / Appearance: Clear / S.017 / pH: x  Gluc: x / Ketone: Negative  / Bili: Negative / Urobili: Negative   Blood: x / Protein: Negative / Nitrite: Negative   Leuk Esterase: Large / RBC: 1 /hpf / WBC 30 /HPF   Sq Epi: x / Non Sq Epi: 6 /hpf / Bacteria: Negative      Imaging:    < from: CT 3D Reconstruct w/ Workstation (20 @ 18:32) >  IMPRESSION:   Status post ACDF at C5/C6 with redemonstration of fractured right C5 screw. Lucency about the bilateral C5 screws and mild anterior displacement of the superior endplate of the hardware.  Pocket of air/debris adjacent to the right side of the fixation plate and the retropharyngeal soft tissues, which may represent a fistulous connection anteriorly to the esophagus or airway though no definitive connection is visualized. Underlying infection is a consideration. Retropharyngeal air contacts the hardware, concerning for hardware infection.  Interval increase in groundglass opacity surrounding the right upper lobe airspace opacity, recommend dedicated chest CT.    Findings discussed with SUSAN Huizar on 2/3/2020 at 11:00 AM.    < end of copied text >

## 2020-02-14 NOTE — CONSULT NOTE ADULT - ASSESSMENT
59 year old female with CAD s/p 4vCABG, MI, AICD, stents x 3 (on Plavix and ASA), HLD, pAFib, chronic back pain, T10-L5 posterior spinal fusion for lumbar disc disease with radiculopathy 6/19/14), hip osteoarthritis s/p total hip arthroplasty 7/13/15, s/p open posterior fusion of 2 or more  joints of posterior column of L vertebrae (11/5/15) presents to the ED sent in by Dr. Dupont with a note reading "C spine with gas, implying erosion into esophagus or gas forming bacteria around the screw."     IMPRESSION  - Abnormal CT scan with pocket of air/debris adjacent to the right side of the fixation plate and the retropharyngeal soft tissues, which may represent a fistulous connection anteriorly to the esophagus or airway though no definitive connection is visualized.     RECOMMENDATION    - recommend repeat CT neck with IV and PO contrast to evaluate for the fistula   - rest of care per primary team    Cristofer Person, PGY-6  GI fellow  B- [phone]/ [phone]  Please call GI fellow on call after 5pm and on weekends 59 year old female with CAD s/p 4vCABG, MI, AICD, stents x 3 (on Plavix and ASA), HLD, pAFib, chronic back pain, T10-L5 posterior spinal fusion for lumbar disc disease with radiculopathy 6/19/14), hip osteoarthritis s/p total hip arthroplasty 7/13/15, s/p open posterior fusion of 2 or more  joints of posterior column of L vertebrae (11/5/15) presents to the ED sent in by Dr. 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  Please call GI fellow on call after 5pm and on weekends

## 2020-02-14 NOTE — CHART NOTE - NSCHARTNOTEFT_GEN_A_CORE
Spoke with Neurosurgery Dr. Perkins regarding CT of neck results- No significant interval change in appearance of fractured right C5 vertebral body screw. The fractured screw fragment is embedded in the right prevertebral soft tissues. Adjacent to this, there is a similar appearing focus of retropharyngeal air asymmetric to the right at the C5 and C6 levels. This could represent the presence of esophageal injury.   Agrees with eventual surgical intervention to remove hardware, however can not operate if she has esophageal leak as there is high risk of infection. Recommended to Esophagram , if negative, GI consult for EGD as recommended ENT.  pt to have Esophagram today. follow up with results    Maris Workman [id number]  #[id number]

## 2020-02-14 NOTE — CONSULT NOTE ADULT - SUBJECTIVE AND OBJECTIVE BOX
CHIEF COMPLAINT: fevers    HISTORY OF PRESENT ILLNESS:  59 year old female with CAD s/p 4vCABG, MI, AICD, stents x 3 (on Plavix and ASA), HLD, pAFib, chronic back pain, T10-L5 posterior spinal fusion for lumbar disc disease with radiculopathy 6/19/14), hip osteoarthritis s/p total hip arthroplasty 7/13/15, s/p open posterior fusion of 2 or more  joints of posterior column of L vertebrae (11/5/15) presents to the ED sent in by Dr. Dupont with a note reading "C spine with gas, implying erosion into esophagus or gas forming bacteria around the screw."     Patient reports that she has been having fevers for two months, intermittently lasting about 8 hours each time and spontaneously resolving. She has been hoarse for many years, reports was sent to ENT, scoped and sent for CT C spine showing that stated result.  She had this CT 2 weeks ago but did not have fever so did not feel she had to come in.  She reports neck pain but no numbness weakness to UE's.         Allergies  No Known Allergies        MEDICATIONS:  clopidogrel Tablet 75 milliGRAM(s) Oral daily  furosemide    Tablet 40 milliGRAM(s) Oral daily  heparin  Injectable 5000 Unit(s) SubCutaneous every 12 hours  losartan 50 milliGRAM(s) Oral two times a day  metoprolol succinate ER 50 milliGRAM(s) Oral two times a day  aspirin 325 milliGRAM(s) Oral daily  diazepam    Tablet 10 milliGRAM(s) Oral four times a day PRN  oxyCODONE    IR 30 milliGRAM(s) Oral four times a day PRN  pantoprazole    Tablet 40 milliGRAM(s) Oral before breakfast  atorvastatin 40 milliGRAM(s) Oral at bedtime  acyclovir Topical 5% Ointment 1 Application(s) Topical once      PAST MEDICAL & SURGICAL HISTORY:  AICD (automatic cardioverter/defibrillator) present  Pseudoarthrosis of lumbar spine  Stented coronary artery: x 3 ESTEVAN  Degenerative disc disease, lumbar  PAD (peripheral artery disease): r leg 06  Hypercholesteremia  Reflux  Back pain  MI (myocardial infarction)  Arthritis  CAD (Coronary Artery Disease): CARDIAC CATH PTCA/[place] x 3 to SVG to RCA 08/2009  Atrial Fibrillation: paroxismal Dx in 08/2009  Elective surgery: Left leg &quot;clean out&quot; secondary PAD  H/O cervical spine surgery  History of back surgery: T10 - L4 posterior fusion  Elective surgery: Right leg art bypass 2006  S/P lumbar spine operation: laminectomy  Elective surgery: AICD 2006  S/P coronary artery stent placement: x 3  - 2010  S/P coronary artery bypass graft x 4: 2006      FAMILY HISTORY:  Family history of heart disease: Mom and Dad - CABG x 4  Family history of coronary arteriosclerosis      SOCIAL HISTORY:    former smoker. independent in adl      REVIEW OF SYSTEMS:  See HPI, otherwise complete 10 point review of systems negative    [ ] All others negative	      PHYSICAL EXAM:  T(C): 36.8 (02-14-20 @ 04:58), Max: 37 (02-13-20 @ 20:44)  HR: 65 (02-14-20 @ 04:58) (60 - 72)  BP: 103/64 (02-14-20 @ 04:58) (103/64 - 133/72)  RR: 18 (02-14-20 @ 04:58) (16 - 18)  SpO2: 95% (02-14-20 @ 04:58) (95% - 98%)  Wt(kg): --  I&O's Summary      Appearance: No Acute Distress	  HEENT:  Normal oral mucosa, PERRL, EOMI	  Cardiovascular: Normal S1 S2, No JVD, No murmurs/rubs/gallops  Respiratory: Lungs clear to auscultation bilaterally  Gastrointestinal:  Soft, Non-tender, + BS	  Skin: No rashes, No ecchymoses, No cyanosis	  Neurologic: Non-focal  Extremities: No clubbing, cyanosis or edema  Vascular: Peripheral pulses palpable 2+ bilaterally  Psychiatry: A & O x 3, Mood & affect appropriate    Laboratory Data:	 	    CBC Full  -  ( 13 Feb 2020 15:51 )  WBC Count : 4.94 K/uL  Hemoglobin : 11.9 g/dL  Hematocrit : 35.6 %  Platelet Count - Automated : 265 K/uL  Mean Cell Volume : 101.7 fl  Mean Cell Hemoglobin : 34.0 pg  Mean Cell Hemoglobin Concentration : 33.4 gm/dL  Auto Neutrophil # : 2.62 K/uL  Auto Lymphocyte # : 1.68 K/uL  Auto Monocyte # : 0.47 K/uL  Auto Eosinophil # : 0.13 K/uL  Auto Basophil # : 0.03 K/uL  Auto Neutrophil % : 53.1 %  Auto Lymphocyte % : 34.0 %  Auto Monocyte % : 9.5 %  Auto Eosinophil % : 2.6 %  Auto Basophil % : 0.6 %    02-13    140  |  100  |  14  ----------------------------<  111<H>  3.3<L>   |  25  |  0.78    Ca    9.9      13 Feb 2020 15:51    TPro  8.0  /  Alb  4.3  /  TBili  0.8  /  DBili  x   /  AST  13  /  ALT  9<L>  /  AlkPhos  86  02-13      	    ECG:  	not uploaded      Assessment:  -CAD s/p 4vCABG and PCI  -ICM s/p AICD   -HLD  -pAFib  -chronic back pain s/p T10-L5 posterior spinal fusion for lumbar disc disease with radiculopathy   -? C spine with gas, implying erosion into esophagus or gas forming bacteria around the screw    Recs:  -c/w metop and losartan for gdmt for lv dysfunction  -c/w lasix 40mg. appears euvolemic  -c/w dapt and statin for cad/stents  -hx of pAF not on ac, remains on high dose asa and plavix  -multispecialty services appreciated - neuro, gi and ent  -f/u ct chest regarding pulm nodule  -care per medicine          Greater than 60 minutes spent on total encounter; more than 50% of the visit was spent counseling and/or coordinating care by the attending physician.   	  Ministerio Dupont MD   Cardiovascular Diseases  [phone]

## 2020-02-14 NOTE — PROGRESS NOTE ADULT - SUBJECTIVE AND OBJECTIVE BOX
Patient seen and examined at bedside.    --Anticoagulation--  clopidogrel Tablet 75 milliGRAM(s) Oral daily  heparin  Injectable 5000 Unit(s) SubCutaneous every 12 hours    T(C): 36.8 (02-14-20 @ 04:58), Max: 37 (02-13-20 @ 20:44)  HR: 65 (02-14-20 @ 04:58) (60 - 72)  BP: 103/64 (02-14-20 @ 04:58) (103/64 - 133/72)  RR: 18 (02-14-20 @ 04:58) (16 - 18)  SpO2: 95% (02-14-20 @ 04:58) (95% - 98%)  Wt(kg): --    Exam:   AOx3, FC, PERRL, EOMI, no facial   5/5 throughout, no drift  SILT  no clonus  denies pain at this time

## 2020-02-14 NOTE — CONSULT NOTE ADULT - SUBJECTIVE AND OBJECTIVE BOX
CC: Fevers and neck pain".    HPI:   60y/o female with PMH/PSH including CAD s/p 4vCABG, MI, AICD, stents x 3 on Plavix and ASA, HLD, Paroxismal AFib, chronic back pain, T10-L5 posterior spinal fusion for lumbar disc disease with radiculopathy 6/19/14), hip osteoarthritis s/p total hip arthroplasty 7/13/15, s/p open posterior fusion of 2 or more  joints of posterior column of L vertebrae (11/5/15) who presents to the ED sent in by Dr. Dupont. Patient had C-spine CT with outside report showing gas, implying erosion into esophagus or gas forming bacteria around the screw. Patient reports that she has been having fevers for two months, intermittently lasting about 8 hours each time and spontaneously resolving.  Reports has been hoarse for many years.. Patient was seen by Dr. Nichelle veloz and sent for CT C spine showing that stated result.  Pt had this CT 2 weeks ago but did not have fever so did not feel she had to come in.  Does report neck pain but no numbness weakness to UE's.  No other focal deficit.  Fevers get better and wrose on their own.        PAST MEDICAL & SURGICAL HISTORY:  AICD (automatic cardioverter/defibrillator) present  Pseudoarthrosis of lumbar spine  Stented coronary artery: x 3 ESTEVAN  Degenerative disc disease, lumbar  PAD (peripheral artery disease): r leg 06  Hypercholesteremia  Reflux  Back pain  MI (myocardial infarction)  Arthritis  CAD (Coronary Artery Disease): CARDIAC CATH PTCA/[place] x 3 to SVG to RCA 08/2009  Atrial Fibrillation: paroxismal Dx in 08/2009  Elective surgery: Left leg &quot;clean out&quot; secondary PAD  H/O cervical spine surgery  History of back surgery: T10 - L4 posterior fusion  Elective surgery: Right leg art bypass 2006  S/P lumbar spine operation: laminectomy  Elective surgery: AICD 2006  S/P coronary artery stent placement: x 3  - 2010  S/P coronary artery bypass graft x 4: 2006    Allergies    No Known Allergies    Intolerances      MEDICATIONS  (STANDING):  acyclovir Topical 5% Ointment 1 Application(s) Topical once  aspirin 325 milliGRAM(s) Oral daily  atorvastatin 40 milliGRAM(s) Oral at bedtime  clopidogrel Tablet 75 milliGRAM(s) Oral daily  furosemide    Tablet 40 milliGRAM(s) Oral daily  heparin  Injectable 5000 Unit(s) SubCutaneous every 12 hours  losartan 50 milliGRAM(s) Oral two times a day  metoprolol succinate ER 50 milliGRAM(s) Oral two times a day  pantoprazole    Tablet 40 milliGRAM(s) Oral before breakfast    MEDICATIONS  (PRN):  diazepam    Tablet 10 milliGRAM(s) Oral four times a day PRN anxiety  oxyCODONE    IR 30 milliGRAM(s) Oral four times a day PRN Severe Pain (7 - 10)      Social History: **??**    Family history: **??**    ROS:   ENT: all negative except as noted in HPI   CV: denies palpitations  Pulm: denies SOB, cough, hemoptysis  GI: denies change in apetite, indigestion, n/v  : denies pertinent urinary symptoms, urgency  Neuro: denies numbness/tingling, loss of sensation  Psych: denies anxiety  MS: denies muscle weakness, instability  Heme: denies easy bruising or bleeding  Endo: denies heat/cold intolerance, excessive sweating  Vascular: denies LE edema    Vital Signs Last 24 Hrs  T(C): 36.6 (14 Feb 2020 09:03), Max: 37 (13 Feb 2020 20:44)  T(F): 97.9 (14 Feb 2020 09:03), Max: 98.6 (13 Feb 2020 20:44)  HR: 67 (14 Feb 2020 09:03) (60 - 72)  BP: 96/60 (14 Feb 2020 09:03) (96/60 - 133/72)  BP(mean): --  RR: 16 (14 Feb 2020 09:03) (16 - 18)  SpO2: 96% (14 Feb 2020 09:03) (95% - 98%)                          11.9   4.94  )-----------( 265      ( 13 Feb 2020 15:51 )             35.6    02-13    140  |  100  |  14  ----------------------------<  111<H>  3.3<L>   |  25  |  0.78    Ca    9.9      13 Feb 2020 15:51    TPro  8.0  /  Alb  4.3  /  TBili  0.8  /  DBili  x   /  AST  13  /  ALT  9<L>  /  AlkPhos  86  02-13   PT/INR - ( 13 Feb 2020 15:51 )   PT: 12.3 sec;   INR: 1.08 ratio         PTT - ( 13 Feb 2020 15:51 )  PTT:28.1 sec    PHYSICAL EXAM:  Gen: NAD  Skin: No rashes, bruises, or lesions  Head: Normocephalic, Atraumatic  Face: no edema, erythema, or fluctuance. Parotid glands soft without mass  Eyes: no scleral injection  Ears: Right - ear canal clear, TM intact without effusion or erythema. No evidence of any fluid drainage. No mastoid tenderness, erythema, or ear bulging            Left - ear canal clear, TM intact without effusion or erythema. No evidence of any fluid drainage. No mastoid tenderness, erythema, or ear bulging  Nose: Nares bilaterally patent, no discharge  Mouth: No Stridor / Drooling / Trismus.  Mucosa moist, tongue/uvula midline, oropharynx clear  Neck: Flat, supple, no lymphadenopathy, trachea midline, no masses  Lymphatic: No lymphadenopathy  Resp: breathing easily, no stridor  CV: no peripheral edema/cyanosis  GI: nondistended   Peripheral vascular: no JVD or edema  Neuro: facial nerve intact, no facial droop        Diagnostic Nasal Endoscopy: (Scope #2 used)    Fiberoptic Indirect laryngoscopy:  (Scope #2 used)        IMAGING/ADDITIONAL STUDIES: CC: Fevers and neck pain".    HPI:   60y/o female with PMH/PSH including CAD s/p 4vCABG, MI, AICD, stents x 3 on Plavix and ASA, HLD, Paroxismal AFib, chronic back pain, T10-L5 posterior spinal fusion for lumbar disc disease with radiculopathy 6/19/14), hip osteoarthritis s/p total hip arthroplasty 7/13/15, s/p open posterior fusion of 2 or more  joints of posterior column of L vertebrae (11/5/15) who presents to the ED sent in by Dr. Dupont. Patient had C-spine CT with outside report showing gas, implying erosion into esophagus or gas forming bacteria around the screw. Patient reports that she has been having fevers for two months, intermittently lasting about 8 hours each time and spontaneously resolving.  Reports has been hoarse for many years. Patient was seen by Dr. Steward outpatient - patient was scoped and sent for CT C spine which showed ultimate result. CT was done 2 weeks ago and was advised to come to the ER for further evaluation, but patient did not have fever so did not feel she had to come in. Patient does report neck pain but no numbness weakness to UE's.  No other focal deficit. Patient states she has persistent hoarseness, fever of 103 last Tuesday, profuse sweating and admits to food getting stuck in her throat. Denies SOB, odynophagia, chest pain.     PAST MEDICAL & SURGICAL HISTORY:  AICD (automatic cardioverter/defibrillator) present  Pseudoarthrosis of lumbar spine  Stented coronary artery: x 3 ESTEVAN  Degenerative disc disease, lumbar  PAD (peripheral artery disease): r leg 06  Hypercholesteremia  Reflux  Back pain  MI (myocardial infarction)  Arthritis  CAD (Coronary Artery Disease): CARDIAC CATH PTCA/[place] x 3 to SVG to RCA 08/2009  Atrial Fibrillation: paroxismal Dx in 08/2009  Elective surgery: Left leg &quot;clean out&quot; secondary PAD  H/O cervical spine surgery  History of back surgery: T10 - L4 posterior fusion  Elective surgery: Right leg art bypass 2006  S/P lumbar spine operation: laminectomy  Elective surgery: AICD 2006  S/P coronary artery stent placement: x 3  - 2010  S/P coronary artery bypass graft x 4: 2006    Allergies    No Known Allergies    Intolerances      MEDICATIONS  (STANDING):  acyclovir Topical 5% Ointment 1 Application(s) Topical once  aspirin 325 milliGRAM(s) Oral daily  atorvastatin 40 milliGRAM(s) Oral at bedtime  clopidogrel Tablet 75 milliGRAM(s) Oral daily  furosemide    Tablet 40 milliGRAM(s) Oral daily  heparin  Injectable 5000 Unit(s) SubCutaneous every 12 hours  losartan 50 milliGRAM(s) Oral two times a day  metoprolol succinate ER 50 milliGRAM(s) Oral two times a day  pantoprazole    Tablet 40 milliGRAM(s) Oral before breakfast    MEDICATIONS  (PRN):  diazepam    Tablet 10 milliGRAM(s) Oral four times a day PRN anxiety  oxyCODONE    IR 30 milliGRAM(s) Oral four times a day PRN Severe Pain (7 - 10)      Social History: See HPI     Family history: See HPI    ROS:   ENT: all negative except as noted in HPI   CV: denies palpitations  Pulm: denies SOB, cough, hemoptysis  GI: denies change in apetite, indigestion, n/v  : denies pertinent urinary symptoms, urgency  Neuro: denies numbness/tingling, loss of sensation  Psych: denies anxiety  MS: denies muscle weakness, instability  Heme: denies easy bruising or bleeding  Endo: denies heat/cold intolerance, excessive sweating  Vascular: denies LE edema    Vital Signs Last 24 Hrs  T(C): 36.6 (14 Feb 2020 09:03), Max: 37 (13 Feb 2020 20:44)  T(F): 97.9 (14 Feb 2020 09:03), Max: 98.6 (13 Feb 2020 20:44)  HR: 67 (14 Feb 2020 09:03) (60 - 72)  BP: 96/60 (14 Feb 2020 09:03) (96/60 - 133/72)  BP(mean): --  RR: 16 (14 Feb 2020 09:03) (16 - 18)  SpO2: 96% (14 Feb 2020 09:03) (95% - 98%)                          11.9   4.94  )-----------( 265      ( 13 Feb 2020 15:51 )             35.6    02-13    140  |  100  |  14  ----------------------------<  111<H>  3.3<L>   |  25  |  0.78    Ca    9.9      13 Feb 2020 15:51    TPro  8.0  /  Alb  4.3  /  TBili  0.8  /  DBili  x   /  AST  13  /  ALT  9<L>  /  AlkPhos  86  02-13   PT/INR - ( 13 Feb 2020 15:51 )   PT: 12.3 sec;   INR: 1.08 ratio         PTT - ( 13 Feb 2020 15:51 )  PTT:28.1 sec    PHYSICAL EXAM:  Gen: NAD  Skin: No rashes, bruises, or lesions  Head: Normocephalic, Atraumatic  Face: no edema, erythema, or fluctuance. Parotid glands soft without mass  Eyes: no scleral injection  Ears: Right - ear canal clear, TM intact without effusion or erythema. No evidence of any fluid drainage. No mastoid tenderness, erythema, or ear bulging            Left - ear canal clear, TM intact without effusion or erythema. No evidence of any fluid drainage. No mastoid tenderness, erythema, or ear bulging  Nose: Nares bilaterally patent, no discharge  Mouth: No Stridor / Drooling / Trismus.  Mucosa moist, tongue/uvula midline, oropharynx clear  Neck: Flat, supple, no lymphadenopathy, trachea midline, no masses. No tenderness upon palpation. Well healed neck scar.   Lymphatic: No lymphadenopathy  Resp: breathing easily, no stridor  CV: no peripheral edema/cyanosis  GI: nondistended   Peripheral vascular: no JVD or edema  Neuro: facial nerve intact, no facial droop    IMAGING/ADDITIONAL STUDIES:     CT Neck 1/31/20:     Status post ACDF at C5/C6 with redemonstration of fractured right C5 screw. Lucency about the bilateral C5 screws and mild anterior displacement of the superior endplate of the hardware.  Pocket of air/debris adjacent to the right side of the fixation plate and the retropharyngeal soft tissues, which may represent a fistulous connection anteriorly to the esophagus or airway though no definitive connection is visualized. Underlying infection is a consideration. Retropharyngeal air contacts the hardware, concerning for hardware infection.  Interval increase in groundglass opacity surrounding the right upper lobe airspace opacity, recommend dedicated chest CT. CC: Fevers and neck pain".    HPI:   60y/o female with PMH/PSH including CAD s/p 4vCABG, MI, AICD, stents x 3 on Plavix and ASA, HLD, Paroxismal AFib, chronic back pain, T10-L5 posterior spinal fusion for lumbar disc disease with radiculopathy 6/19/14), hip osteoarthritis s/p total hip arthroplasty 7/13/15, s/p open posterior fusion of 2 or more  joints of posterior column of L vertebrae (11/5/15) who presents to the ED sent in by Dr. Dupont. Patient had C-spine CT with outside report showing gas, implying erosion into esophagus or gas forming bacteria around the screw. Patient reports that she has been having fevers for two months, intermittently lasting about 8 hours each time and spontaneously resolving.  Reports has been hoarse for many years. Patient was seen by Dr. Steward outpatient - patient was scoped and sent for CT C spine which showed ultimate result. CT was done 2 weeks ago and was advised to come to the ER for further evaluation, but patient did not have fever so did not feel she had to come in. Patient does report neck pain but no numbness weakness to UE's.  No other focal deficit. Patient states she has persistent hoarseness, fever of 103 last Tuesday, profuse sweating and admits to food getting stuck in her throat. Denies SOB, odynophagia, chest pain.     PAST MEDICAL & SURGICAL HISTORY:  AICD (automatic cardioverter/defibrillator) present  Pseudoarthrosis of lumbar spine  Stented coronary artery: x 3 ESTEVAN  Degenerative disc disease, lumbar  PAD (peripheral artery disease): r leg 06  Hypercholesteremia  Reflux  Back pain  MI (myocardial infarction)  Arthritis  CAD (Coronary Artery Disease): CARDIAC CATH PTCA/[place] x 3 to SVG to RCA 08/2009  Atrial Fibrillation: paroxismal Dx in 08/2009  Elective surgery: Left leg &quot;clean out&quot; secondary PAD  H/O cervical spine surgery  History of back surgery: T10 - L4 posterior fusion  Elective surgery: Right leg art bypass 2006  S/P lumbar spine operation: laminectomy  Elective surgery: AICD 2006  S/P coronary artery stent placement: x 3  - 2010  S/P coronary artery bypass graft x 4: 2006    Allergies    No Known Allergies    Intolerances      MEDICATIONS  (STANDING):  acyclovir Topical 5% Ointment 1 Application(s) Topical once  aspirin 325 milliGRAM(s) Oral daily  atorvastatin 40 milliGRAM(s) Oral at bedtime  clopidogrel Tablet 75 milliGRAM(s) Oral daily  furosemide    Tablet 40 milliGRAM(s) Oral daily  heparin  Injectable 5000 Unit(s) SubCutaneous every 12 hours  losartan 50 milliGRAM(s) Oral two times a day  metoprolol succinate ER 50 milliGRAM(s) Oral two times a day  pantoprazole    Tablet 40 milliGRAM(s) Oral before breakfast    MEDICATIONS  (PRN):  diazepam    Tablet 10 milliGRAM(s) Oral four times a day PRN anxiety  oxyCODONE    IR 30 milliGRAM(s) Oral four times a day PRN Severe Pain (7 - 10)      Social History: See HPI     Family history: See HPI    ROS:   ENT: all negative except as noted in HPI   CV: denies palpitations  Pulm: denies SOB, cough, hemoptysis  GI: denies change in apetite, indigestion, n/v  : denies pertinent urinary symptoms, urgency  Neuro: denies numbness/tingling, loss of sensation  Psych: denies anxiety  MS: denies muscle weakness, instability  Heme: denies easy bruising or bleeding  Endo: denies heat/cold intolerance, excessive sweating  Vascular: denies LE edema    Vital Signs Last 24 Hrs  T(C): 36.6 (14 Feb 2020 09:03), Max: 37 (13 Feb 2020 20:44)  T(F): 97.9 (14 Feb 2020 09:03), Max: 98.6 (13 Feb 2020 20:44)  HR: 67 (14 Feb 2020 09:03) (60 - 72)  BP: 96/60 (14 Feb 2020 09:03) (96/60 - 133/72)  BP(mean): --  RR: 16 (14 Feb 2020 09:03) (16 - 18)  SpO2: 96% (14 Feb 2020 09:03) (95% - 98%)                          11.9   4.94  )-----------( 265      ( 13 Feb 2020 15:51 )             35.6    02-13    140  |  100  |  14  ----------------------------<  111<H>  3.3<L>   |  25  |  0.78    Ca    9.9      13 Feb 2020 15:51    TPro  8.0  /  Alb  4.3  /  TBili  0.8  /  DBili  x   /  AST  13  /  ALT  9<L>  /  AlkPhos  86  02-13   PT/INR - ( 13 Feb 2020 15:51 )   PT: 12.3 sec;   INR: 1.08 ratio         PTT - ( 13 Feb 2020 15:51 )  PTT:28.1 sec    PHYSICAL EXAM:  Gen: NAD  Skin: No rashes, bruises, or lesions  Head: Normocephalic, Atraumatic  Face: no edema, erythema, or fluctuance. Parotid glands soft without mass  Eyes: no scleral injection  Ears: Right - ear canal clear, TM intact without effusion or erythema. No evidence of any fluid drainage. No mastoid tenderness, erythema, or ear bulging            Left - ear canal clear, TM intact without effusion or erythema. No evidence of any fluid drainage. No mastoid tenderness, erythema, or ear bulging  Nose: Nares bilaterally patent, no discharge  Mouth: No Stridor / Drooling / Trismus.  Mucosa moist, tongue/uvula midline, oropharynx clear  Neck: Flat, supple, no lymphadenopathy, trachea midline, no masses. No tenderness upon palpation. Well healed neck scar.   Lymphatic: No lymphadenopathy  Resp: breathing easily, no stridor  CV: no peripheral edema/cyanosis  GI: nondistended   Peripheral vascular: no JVD or edema  Neuro: facial nerve intact, no facial droop    Reason for Laryngoscopy:    Patient was unable to cooperate with mirror.  Nasopharynx, oropharynx, and hypopharynx clear, no bleeding. Tongue base, posterior pharyngeal wall, vallecula, epiglottis, and subglottis appear normal. No erythema, edema, pooling of secretions, masses or lesions. Airway patent, no foreign body visualized. No glottic/supraglottic edema. True vocal cords, arytenoids, vestibular folds, ventricles, pyriform sinuses, and aryepiglottic folds appear normal bilaterally. Vocal cords mobile with good contact b/l.      IMAGING/ADDITIONAL STUDIES:     CT Neck 1/31/20:     Status post ACDF at C5/C6 with redemonstration of fractured right C5 screw. Lucency about the bilateral C5 screws and mild anterior displacement of the superior endplate of the hardware.  Pocket of air/debris adjacent to the right side of the fixation plate and the retropharyngeal soft tissues, which may represent a fistulous connection anteriorly to the esophagus or airway though no definitive connection is visualized. Underlying infection is a consideration. Retropharyngeal air contacts the hardware, concerning for hardware infection.  Interval increase in groundglass opacity surrounding the right upper lobe airspace opacity, recommend dedicated chest CT.

## 2020-02-14 NOTE — PROGRESS NOTE ADULT - ASSESSMENT
59 f with    Abnormal hardware Cervical spine  - ID evaluation/ observe off antibiotics  - Neurosurgical evaluation  - pain control  - ESR, CRP    Possible esophageal fistula  - GI evaluation  - esophagogram with no perforation    AICD (automatic cardioverter/defibrillator) present   - EP check    Arthritis   - pain control    Atrial Fibrillation paroxismal Dx in 08/2009  rate control    Back pain   - pain control    CAD (Coronary Artery Disease)   - continue Rx  - cardiology evaluation     Hypercholesteremia   - continue Rx    PAD (peripheral artery disease) r leg 06  - stable    d/w patient and  at length     Mega Zimmerman MD pager [phone]

## 2020-02-14 NOTE — CONSULT NOTE ADULT - ASSESSMENT
58 y/o female with PMH/PSH including CAD s/p 4vCABG, MI, AICD, stents x 3 on Plavix and ASA, HLD, Paroxismal AFib, chronic back pain, T10-L5 posterior spinal fusion for lumbar disc disease with radiculopathy 6/19/14), hip osteoarthritis s/p total hip arthroplasty 7/13/15, s/p open posterior fusion of 2 or more  joints of posterior column of L vertebrae (11/5/15). C-spine CT with outside report showing gas, implying erosion into esophagus or gas forming bacteria around the screw. Patient reports that she has been having fevers for two months, intermittently lasting about 8 hours each time and spontaneously resolving. Patient with persistent hoarseness, fever of 103 last Tuesday, profuse sweating and admits to food getting stuck in her throat. Denies SOB, odynophagia, chest pain.

## 2020-02-15 LAB
ANION GAP SERPL CALC-SCNC: 13 MMOL/L — SIGNIFICANT CHANGE UP (ref 5–17)
BASOPHILS # BLD AUTO: 0.06 K/UL — SIGNIFICANT CHANGE UP (ref 0–0.2)
BASOPHILS NFR BLD AUTO: 1 % — SIGNIFICANT CHANGE UP (ref 0–2)
BUN SERPL-MCNC: 22 MG/DL — SIGNIFICANT CHANGE UP (ref 7–23)
CALCIUM SERPL-MCNC: 9.9 MG/DL — SIGNIFICANT CHANGE UP (ref 8.4–10.5)
CHLORIDE SERPL-SCNC: 100 MMOL/L — SIGNIFICANT CHANGE UP (ref 96–108)
CO2 SERPL-SCNC: 25 MMOL/L — SIGNIFICANT CHANGE UP (ref 22–31)
CREAT SERPL-MCNC: 0.99 MG/DL — SIGNIFICANT CHANGE UP (ref 0.5–1.3)
CRP SERPL-MCNC: 1.61 MG/DL — HIGH (ref 0–0.4)
EOSINOPHIL # BLD AUTO: 0.14 K/UL — SIGNIFICANT CHANGE UP (ref 0–0.5)
EOSINOPHIL NFR BLD AUTO: 2.3 % — SIGNIFICANT CHANGE UP (ref 0–6)
ERYTHROCYTE [SEDIMENTATION RATE] IN BLOOD: 92 MM/HR — HIGH (ref 0–20)
GLUCOSE SERPL-MCNC: 93 MG/DL — SIGNIFICANT CHANGE UP (ref 70–99)
HCT VFR BLD CALC: 33.6 % — LOW (ref 34.5–45)
HCV AB S/CO SERPL IA: 0.19 S/CO — SIGNIFICANT CHANGE UP (ref 0–0.99)
HCV AB SERPL-IMP: SIGNIFICANT CHANGE UP
HGB BLD-MCNC: 11.1 G/DL — LOW (ref 11.5–15.5)
IMM GRANULOCYTES NFR BLD AUTO: 0.5 % — SIGNIFICANT CHANGE UP (ref 0–1.5)
LYMPHOCYTES # BLD AUTO: 1.84 K/UL — SIGNIFICANT CHANGE UP (ref 1–3.3)
LYMPHOCYTES # BLD AUTO: 30.4 % — SIGNIFICANT CHANGE UP (ref 13–44)
MCHC RBC-ENTMCNC: 33 GM/DL — SIGNIFICANT CHANGE UP (ref 32–36)
MCHC RBC-ENTMCNC: 34.5 PG — HIGH (ref 27–34)
MCV RBC AUTO: 104.3 FL — HIGH (ref 80–100)
MONOCYTES # BLD AUTO: 0.57 K/UL — SIGNIFICANT CHANGE UP (ref 0–0.9)
MONOCYTES NFR BLD AUTO: 9.4 % — SIGNIFICANT CHANGE UP (ref 2–14)
NEUTROPHILS # BLD AUTO: 3.41 K/UL — SIGNIFICANT CHANGE UP (ref 1.8–7.4)
NEUTROPHILS NFR BLD AUTO: 56.4 % — SIGNIFICANT CHANGE UP (ref 43–77)
NRBC # BLD: 0 /100 WBCS — SIGNIFICANT CHANGE UP (ref 0–0)
PLATELET # BLD AUTO: 243 K/UL — SIGNIFICANT CHANGE UP (ref 150–400)
POTASSIUM SERPL-MCNC: 3.8 MMOL/L — SIGNIFICANT CHANGE UP (ref 3.5–5.3)
POTASSIUM SERPL-SCNC: 3.8 MMOL/L — SIGNIFICANT CHANGE UP (ref 3.5–5.3)
PROCALCITONIN SERPL-MCNC: 0.1 NG/ML — SIGNIFICANT CHANGE UP (ref 0.02–0.1)
RBC # BLD: 3.22 M/UL — LOW (ref 3.8–5.2)
RBC # FLD: 13.8 % — SIGNIFICANT CHANGE UP (ref 10.3–14.5)
SODIUM SERPL-SCNC: 138 MMOL/L — SIGNIFICANT CHANGE UP (ref 135–145)
WBC # BLD: 6.05 K/UL — SIGNIFICANT CHANGE UP (ref 3.8–10.5)
WBC # FLD AUTO: 6.05 K/UL — SIGNIFICANT CHANGE UP (ref 3.8–10.5)

## 2020-02-15 PROCEDURE — 93010 ELECTROCARDIOGRAM REPORT: CPT

## 2020-02-15 PROCEDURE — 99223 1ST HOSP IP/OBS HIGH 75: CPT

## 2020-02-15 RX ORDER — ACETAMINOPHEN 500 MG
1000 TABLET ORAL ONCE
Refills: 0 | Status: COMPLETED | OUTPATIENT
Start: 2020-02-15 | End: 2020-02-15

## 2020-02-15 RX ORDER — OXYCODONE HYDROCHLORIDE 5 MG/1
30 TABLET ORAL EVERY 6 HOURS
Refills: 0 | Status: DISCONTINUED | OUTPATIENT
Start: 2020-02-15 | End: 2020-02-16

## 2020-02-15 RX ADMIN — Medication 10 MILLIGRAM(S): at 00:33

## 2020-02-15 RX ADMIN — HEPARIN SODIUM 5000 UNIT(S): 5000 INJECTION INTRAVENOUS; SUBCUTANEOUS at 05:43

## 2020-02-15 RX ADMIN — OXYCODONE HYDROCHLORIDE 30 MILLIGRAM(S): 5 TABLET ORAL at 18:10

## 2020-02-15 RX ADMIN — PANTOPRAZOLE SODIUM 40 MILLIGRAM(S): 20 TABLET, DELAYED RELEASE ORAL at 05:47

## 2020-02-15 RX ADMIN — SODIUM CHLORIDE 75 MILLILITER(S): 9 INJECTION INTRAMUSCULAR; INTRAVENOUS; SUBCUTANEOUS at 07:54

## 2020-02-15 RX ADMIN — SODIUM CHLORIDE 75 MILLILITER(S): 9 INJECTION INTRAMUSCULAR; INTRAVENOUS; SUBCUTANEOUS at 05:47

## 2020-02-15 RX ADMIN — Medication 1000 MILLIGRAM(S): at 17:58

## 2020-02-15 RX ADMIN — Medication 325 MILLIGRAM(S): at 12:02

## 2020-02-15 RX ADMIN — OXYCODONE HYDROCHLORIDE 30 MILLIGRAM(S): 5 TABLET ORAL at 06:10

## 2020-02-15 RX ADMIN — OXYCODONE HYDROCHLORIDE 30 MILLIGRAM(S): 5 TABLET ORAL at 12:09

## 2020-02-15 RX ADMIN — Medication 40 MILLIGRAM(S): at 05:42

## 2020-02-15 RX ADMIN — Medication 10 MILLIGRAM(S): at 22:10

## 2020-02-15 RX ADMIN — OXYCODONE HYDROCHLORIDE 30 MILLIGRAM(S): 5 TABLET ORAL at 12:39

## 2020-02-15 RX ADMIN — OXYCODONE HYDROCHLORIDE 30 MILLIGRAM(S): 5 TABLET ORAL at 05:40

## 2020-02-15 RX ADMIN — Medication 50 MILLIGRAM(S): at 05:42

## 2020-02-15 RX ADMIN — HEPARIN SODIUM 5000 UNIT(S): 5000 INJECTION INTRAVENOUS; SUBCUTANEOUS at 17:27

## 2020-02-15 RX ADMIN — ATORVASTATIN CALCIUM 40 MILLIGRAM(S): 80 TABLET, FILM COATED ORAL at 22:10

## 2020-02-15 RX ADMIN — CLOPIDOGREL BISULFATE 75 MILLIGRAM(S): 75 TABLET, FILM COATED ORAL at 12:01

## 2020-02-15 RX ADMIN — LOSARTAN POTASSIUM 50 MILLIGRAM(S): 100 TABLET, FILM COATED ORAL at 05:42

## 2020-02-15 RX ADMIN — Medication 10 MILLIGRAM(S): at 07:54

## 2020-02-15 RX ADMIN — Medication 400 MILLIGRAM(S): at 17:42

## 2020-02-15 NOTE — PROGRESS NOTE ADULT - ASSESSMENT
59 f with    Abnormal hardware Cervical spine  - ID evaluation/ observe off antibiotics  - Neurosurgical follow  - pain control  - ESR, CRP    Possible esophageal fistula  - GI evaluation re need for EGD  - esophagogram with no perforation    AICD (automatic cardioverter/defibrillator) present   - EP check    Arthritis   - pain control    Atrial Fibrillation paroxismal Dx in 08/2009  rate control    Back pain   - pain control    CAD (Coronary Artery Disease)   - continue Rx  - cardiology evaluation     Hypercholesteremia   - continue Rx    PAD (peripheral artery disease) r leg 06  -stable    Lung opacities  - Pulmonary evaluation Dr. Gutierrez    d/w patient at length     Mega Zimmerman MD pager [phone]

## 2020-02-15 NOTE — CHART NOTE - NSCHARTNOTEFT_GEN_A_CORE
SALLY HUERTAEEN  59y Female  Patient is a 59y old  Female who presents with a chief complaint of abnormal CT C spine (15 Feb 2020 12:55)    Event Summary:  Thoracic recommendations noted, and discussed with House GI Fellow plans for EGD. As per Fellow, tentative plan for EGD on Tuesday 2/18 pending discussion with GI attending, may preemptively place patient NPO p MN on Monday 2/17.   -As per RN, patient requesting regular diet, with risks vs benefits discussed with patient concerning her risk of aspirations. Patient A&Ox4, demonstrates understanding and is willing to assume responsibilities for her medical decision.     Nathaniel Saintus DNP, [facility]  #[phone]

## 2020-02-15 NOTE — PROGRESS NOTE ADULT - ASSESSMENT
58 yo female with multiple spinal fusions now presenting with fractured hardware s/p ACDF at C5/C6. Indirect laryngoscopy performed yesterday which showed no abnormalities. CT scan shows fractured right C5 screw with mild anterior displacement of superior endplate of hardware and retropharyngeal air contacting hardware, concerning for infection. Esophagram performed which was unremarkable with no extravasation of contrast.

## 2020-02-15 NOTE — PROGRESS NOTE ADULT - SUBJECTIVE AND OBJECTIVE BOX
Cardiovascular Disease Progress Note    Overnight events: No acute events overnight.  no new cardiac sx  Otherwise review of systems negative    Objective Findings:  T(C): 37 (02-15-20 @ 08:30), Max: 37 (02-15-20 @ 08:30)  HR: 64 (02-15-20 @ 08:30) (62 - 70)  BP: 95/67 (02-15-20 @ 08:30) (93/57 - 117/68)  RR: 16 (02-15-20 @ 08:30) (16 - 16)  SpO2: 95% (02-15-20 @ 08:30) (95% - 97%)  Wt(kg): --  Daily     Daily       Physical Exam:  Gen: NAD  HEENT: EOMI  CV: RRR, normal S1 + S2, no m/r/g  Lungs: CTAB  Abd: soft, non-tender  Ext: No edema    Telemetry:    Laboratory Data:                        11.1   6.05  )-----------( 243      ( 15 Feb 2020 06:54 )             33.6     02-15    138  |  100  |  22  ----------------------------<  93  3.8   |  25  |  0.99    Ca    9.9      15 Feb 2020 06:54    TPro  8.0  /  Alb  4.3  /  TBili  0.8  /  DBili  x   /  AST  13  /  ALT  9<L>  /  AlkPhos  86  02-13    PT/INR - ( 13 Feb 2020 15:51 )   PT: 12.3 sec;   INR: 1.08 ratio         PTT - ( 13 Feb 2020 15:51 )  PTT:28.1 sec          Inpatient Medications:  MEDICATIONS  (STANDING):  aspirin 325 milliGRAM(s) Oral daily  atorvastatin 40 milliGRAM(s) Oral at bedtime  clopidogrel Tablet 75 milliGRAM(s) Oral daily  furosemide    Tablet 40 milliGRAM(s) Oral daily  heparin  Injectable 5000 Unit(s) SubCutaneous every 12 hours  losartan 50 milliGRAM(s) Oral two times a day  metoprolol succinate ER 50 milliGRAM(s) Oral two times a day  pantoprazole    Tablet 40 milliGRAM(s) Oral before breakfast  sodium chloride 0.9%. 1000 milliLiter(s) (75 mL/Hr) IV Continuous <Continuous>      Assessment:  -CAD s/p 4vCABG and PCI  -ICM s/p AICD   -HLD  -pAFib  -chronic back pain s/p T10-L5 posterior spinal fusion for lumbar disc disease with radiculopathy   -? C spine with gas, implying erosion into esophagus or gas forming bacteria around the screw    Recs:  -c/w metop and losartan for gdmt for lv dysfunction  -c/w lasix 40mg. appears euvolemic  -c/w dapt and statin for cad/stents  -hx of pAF not on ac, remains on high dose asa and plavix  -multispecialty services appreciated - neuro, gi and ent  -s/p neg esophagram  -f/u ct chest regarding pulm nodule  -care per medicine        Over 25 minutes spent on total encounter; more than 50% of the visit was spent counseling and/or coordinating care by the attending physician.      Ministerio Dupont MD   Cardiovascular Disease  [phone]

## 2020-02-15 NOTE — CONSULT NOTE ADULT - SUBJECTIVE AND OBJECTIVE BOX
General Surgery Consult  Consulting surgical team: Thoracic Surgery  Consulting attending: Boris Schreiber    HPI:  59F with PMH/PSH including CAD s/p 4vCABG, MI, AICD, stents x 3 on Plavix and ASA, HLD, pAFib, chronic back pain, ACDF C5-6 (), T10-L5 posterior spinal fusion for lumbar disc disease with radiculopathy 14), hip osteoarthritis s/p total hip arthroplasty 7/13/15, s/p open posterior fusion of 2 or more  joints of posterior column of L vertebrae (11/5/15) presents to the ED sent in by Dr. Dupont with a note reading "C spine with gas, implying erosion into esophagus or gas forming bacteria around the screw."  Patient reports that she has been having fevers for two months, intermittently lasting about 8 hours each time and spontaneously resolving.  Reports has been hoarse for many years, reports was sent to ENT, scoped and sent for CT C spine showing that stated result.  Pt had this CT 2 weeks ago but did not have fever so did not feel she had to come in.  Does report neck pain but no numbness weakness to UE's.  No other focal deficit.  Fevers get better and wrose on their own. (2020 19:49)    Esophagram performed (water soluble followed by barium) demonstrating no leak. Patient was evaluated by ENT who recommended endoscopy. GI recommended thoracic evaluation for endoscopy due to location of concern of esophagus. Patient denies dysphagia, abdominal pain, SOB. Tolerating full liquid diet.       PAST MEDICAL HISTORY:  AICD (automatic cardioverter/defibrillator) present  Pseudoarthrosis of lumbar spine  Stented coronary artery  Degenerative disc disease, lumbar  PAD (peripheral artery disease)  Hypercholesteremia  Reflux  Back pain  MI (myocardial infarction)  Arthritis  S/P coronary artery stent placement  S/P coronary artery bypass graft x 4  CAD (Coronary Artery Disease)  Atrial Fibrillation      PAST SURGICAL HISTORY:  Elective surgery  H/O cervical spine surgery  History of back surgery  Elective surgery  S/P lumbar spine operation  Elective surgery  S/P coronary artery stent placement  S/P coronary artery bypass graft x 4      MEDICATIONS:  aspirin 325 milliGRAM(s) Oral daily  atorvastatin 40 milliGRAM(s) Oral at bedtime  clopidogrel Tablet 75 milliGRAM(s) Oral daily  diazepam    Tablet 10 milliGRAM(s) Oral four times a day PRN  furosemide    Tablet 40 milliGRAM(s) Oral daily  heparin  Injectable 5000 Unit(s) SubCutaneous every 12 hours  losartan 50 milliGRAM(s) Oral two times a day  metoprolol succinate ER 50 milliGRAM(s) Oral two times a day  oxyCODONE    IR 30 milliGRAM(s) Oral every 6 hours PRN  pantoprazole    Tablet 40 milliGRAM(s) Oral before breakfast  sodium chloride 0.9%. 1000 milliLiter(s) IV Continuous <Continuous>      ALLERGIES:  No Known Allergies      VITALS & I/Os:  Vital Signs Last 24 Hrs  T(C): 37 (15 Feb 2020 08:30), Max: 37 (15 Feb 2020 08:30)  T(F): 98.6 (15 Feb 2020 08:30), Max: 98.6 (15 Feb 2020 08:30)  HR: 64 (15 Feb 2020 08:30) (64 - 70)  BP: 95/67 (15 Feb 2020 08:30) (93/57 - 117/68)  BP(mean): --  RR: 16 (15 Feb 2020 08:30) (16 - 16)  SpO2: 95% (15 Feb 2020 08:30) (95% - 97%)    I&O's Summary    2020 07:01  -  15 Feb 2020 07:00  --------------------------------------------------------  IN: 2790 mL / OUT: 0 mL / NET: 2790 mL        PHYSICAL EXAM:  General: No acute distress  Respiratory: Nonlabored  Cardiovascular: RRR  Abdominal: Soft, nondistended, nontender  Extremities: Warm    LABS:                        11.1   6.05  )-----------( 243      ( 15 Feb 2020 06:54 )             33.6     02-15    138  |  100  |  22  ----------------------------<  93  3.8   |  25  |  0.99    Ca    9.9      15 Feb 2020 06:54    TPro  8.0  /  Alb  4.3  /  TBili  0.8  /  DBili  x   /  AST  13  /  ALT  9<L>  /  AlkPhos  86      Lactate:    PT/INR - ( 2020 15:51 )   PT: 12.3 sec;   INR: 1.08 ratio         PTT - ( 2020 15:51 )  PTT:28.1 sec          Urinalysis Basic - ( 2020 16:36 )    Color: Yellow / Appearance: Clear / S.017 / pH: x  Gluc: x / Ketone: Negative  / Bili: Negative / Urobili: Negative   Blood: x / Protein: Negative / Nitrite: Negative   Leuk Esterase: Large / RBC: 1 /hpf / WBC 30 /HPF   Sq Epi: x / Non Sq Epi: 6 /hpf / Bacteria: Negative        IMAGING:  < from: Xray Esophagram Single Contrast (20 @ 18:22) >  EXAM:  XR ESOPH SNGL CON STUDY                            PROCEDURE DATE:  2020            INTERPRETATION:  CLINICAL INFORMATION: Status post cervical spine fusion with focus of retropharyngeal air on CT. Concern for esophageal injury    COMPARISON: CT neck 2020 and CT chest 2020     TECHNIQUE: Single contrast esophagram.    TOTAL FLUOROSCOPY TIME: 2.1 minutes.    FINDINGS:   A  view of the chest demonstrates a left-sided AICD, status post sternotomy. The cardiac silhouetteis within normal limits. The lungs are clear. Cervical spine fusion hardware and partially visualized thoracolumbar spine fusion hardware with otherwise unremarkable bones and soft tissues.    The patient was given water-soluble contrast followed by a liquid barium suspension and fluoroscopic spot films were obtained.     The esophagus showed normal contour and motility. No extravasation of contrast identified.    Contrast passed unimpeded through the gastroesophageal junction into a normal appearing stomach.     IMPRESSION:  Unremarkable esophagram.    No extravasation of contrast to suggest esophageal perforation.    ROMA BELLA M.D., RADIOLGY RESIDENT  This document has been electronically signed.  BENJIE SINGH M.D., ATTENDING RADIOLOGIST  This document has been electronically signed. 2020  8:38PM          < end of copied text >

## 2020-02-15 NOTE — CHART NOTE - NSCHARTNOTEFT_GEN_A_CORE
Medicine [facility] [place]     MADELINE HUERTA  MRN-[id number]    59F with PMH/PSH including CAD s/p 4vCABG, MI, AICD, stents x 3 on Plavix and ASA, HLD, pAFib, chronic back pain, T10-L5 posterior spinal fusion for lumbar disc disease with radiculopathy 6/19/14), hip osteoarthritis s/p total hip arthroplasty 7/13/15, s/p open posterior fusion of 2 or more  joints of posterior column of L vertebrae (11/5/15)     Notified by RN for patient with bradycardia, occurring roughly around 7pm. Patient with HR 53-60. Patient seen and evaluated at bedside in NAD, lying in bed comfortable. Denies any CP/SOB/N/V/D/headache/dizziness/diaphoresis or abdominal pain.    Vital Signs Last 24 Hrs  T(C): 36.6 (02-15-20 @ 19:41), Max: 37 (02-15-20 @ 08:30)  T(F): 97.8 (02-15-20 @ 19:41), Max: 98.6 (02-15-20 @ 08:30)  HR: 57 (02-15-20 @ 19:41) (57 - 75)  BP: 103/65 (02-15-20 @ 19:41) (94/65 - 117/68)  BP(mean): --  RR: 18 (02-15-20 @ 19:41) (16 - 18)  SpO2: 96% (02-15-20 @ 19:41) (95% - 97%)  Daily     Daily   I&O's Summary    14 Feb 2020 07:01  -  15 Feb 2020 07:00  --------------------------------------------------------  IN: 2790 mL / OUT: 0 mL / NET: 2790 mL    15 Feb 2020 07:01  -  15 Feb 2020 19:46  --------------------------------------------------------  IN: 880 mL / OUT: 350 mL / NET: 530 mL      CAPILLARY BLOOD GLUCOSE                          11.1   6.05  )-----------( 243      ( 15 Feb 2020 06:54 )             33.6     02-15    138  |  100  |  22  ----------------------------<  93  3.8   |  25  |  0.99    Ca    9.9      15 Feb 2020 06:54          PHYSICAL EXAM:  GENERAL: NAD,   CHEST/LUNG: Clear to auscultation bilaterally;  HEART: Regular rate and rhythm;   ABDOMEN: Soft, Nontender, Nondistended; Bowel sounds present  PSYCH: AAOx3  NEUROLOGY: non-focal    Assessment/Plan: bradycardia   - VSS, aside from HR  57, SBP  > 100   - EKG performed showing sinus bradycardia 56  - patient on toprol 50mg bid, afternoon dose held by rn at 6pm,   - patient asymptomatic normal trend during admission 60-70s   - d/w Dr. Zimmerman, who instructed to continue to monitor(if further decline or symptomatic consider transfer to telemetry), okay to D/C continuous pulse ox (stable on RA since admission)  - f/u cardiology   - will monitor closely overnight   - will endorse to AM team   - attending to follow in AM      Toni Quezada PA-C,   Department of Medicine

## 2020-02-15 NOTE — CONSULT NOTE ADULT - SUBJECTIVE AND OBJECTIVE BOX
Pulmonary Consult Note    MADELINE HUERTA  MRN-[id number]    Chief Complaint: Patient is a 59y old  Female who presents with a chief complaint of abnormal CT C spine (15 Feb 2020 11:42)      HPI:  59yFemale   -Patient currently denies chest pain, dyspnea, cough, sputum production, hemoptysis, no fevers since admission.  -due to have infected spinal hardware removed, next week?    ROS:  All other systems reviewed and negative    PAST MEDICAL HISTORY: HEALTH ISSUES - PROBLEM Dx:  Neck pain: Neck pain      SOCIAL HISTORY: former smoker, quit 8 months ago    ACTIVE MEDICATION LIST:  MEDICATIONS  (STANDING):  aspirin 325 milliGRAM(s) Oral daily  atorvastatin 40 milliGRAM(s) Oral at bedtime  clopidogrel Tablet 75 milliGRAM(s) Oral daily  furosemide    Tablet 40 milliGRAM(s) Oral daily  heparin  Injectable 5000 Unit(s) SubCutaneous every 12 hours  losartan 50 milliGRAM(s) Oral two times a day  metoprolol succinate ER 50 milliGRAM(s) Oral two times a day  pantoprazole    Tablet 40 milliGRAM(s) Oral before breakfast  sodium chloride 0.9%. 1000 milliLiter(s) (75 mL/Hr) IV Continuous <Continuous>    MEDICATIONS  (PRN):  diazepam    Tablet 10 milliGRAM(s) Oral four times a day PRN anxiety  oxyCODONE    IR 30 milliGRAM(s) Oral every 6 hours PRN Severe Pain (7 - 10)      EXAM:  Vital Signs Last 24 Hrs  T(C): 37 (15 Feb 2020 08:30), Max: 37 (15 Feb 2020 08:30)  T(F): 98.6 (15 Feb 2020 08:30), Max: 98.6 (15 Feb 2020 08:30)  HR: 73 (15 Feb 2020 12:05) (64 - 73)  BP: 106/63 (15 Feb 2020 12:05) (93/57 - 117/68)  BP(mean): --  RR: 16 (15 Feb 2020 08:30) (16 - 16)  SpO2: 95% (15 Feb 2020 08:30) (95% - 97%)  GENERAL: No acute distress  NEURO: Alert and oriented x 3  LUNGS: Clear to auscultation bilaterally, no rales or wheezes  CV: S1/S2, no murmur  ABDOMEN: +BS, nontender  EXTREMITIES: no clubbing, cyanosis, edema    LABS/IMAGING: reviewed                        11.1   6.05  )-----------( 243      ( 15 Feb 2020 06:54 )             33.6   02-15    138  |  100  |  22  ----------------------------<  93  3.8   |  25  |  0.99    Ca    9.9      15 Feb 2020 06:54    TPro  8.0  /  Alb  4.3  /  TBili  0.8  /  DBili  x   /  AST  13  /  ALT  9<L>  /  AlkPhos  86  02-13    < from: CT Chest No Cont (02.14.20 @ 13:44) >  EXAM:  CT CHEST                            PROCEDURE DATE:  02/14/2020            INTERPRETATION:  CLINICAL INFORMATION: Cervical spine fusion hardware, sepsis, abnormal chest radiograph    COMPARISON: Chest radiograph 2/13/2020, chest CT from 2/14/2019    PROCEDURE:   CT of the Chest was performed without intravenous contrast.  Sagittal and coronal reformats were performed.      FINDINGS:    There is new right upper lobe groundglass opacities. New subpleural nodules along the minor and major fissures in the right middle lobe and the right upper lobe.    Irregularly-shaped right apical opacity is increased in size measuring 3.4 cm, with adjacent nodule and groundglass opacities. The nodule measures 0.5 cm (3, 26).     No pleural effusion.    Left chest wall AICD with leads in the right atrium and right ventricle. The heart is normal in size. Status post CABG. There is no pericardial effusion. Coronary artery calcifications are present. The main pulmonary artery is normal in caliber. Theaorta is normal in course and caliber.    Mediastinal lymph nodes are unchanged.    Evaluation below the diaphragm demonstrates hyperintensity within the gallbladder, likely representing vicarious excretion of contrast. Contrast is also seen within the bilateral kidneys, which may be from prior contrast enhanced study 2/13/2020, which can be seen in the setting of acute kidney injury.    Status post median sternotomy. Cervical spinal fusion hardware is partially visualized. Status post lumbar spinal fusion hardware, partially visualized spanning from T10 to the visualized portions of L3. The known C5 vertebral body screw fracture and retropharyngeal air is better evaluated on CT neck 2/13/2020.    IMPRESSION:     1.  Patchy groundglass opacities in the right upper lobe, right middle lobe, and the right lower lobe are new since prior exam. These findings are nonspecific. Recommend follow-up in 6 - 8 weeks.    < end of copied text >      PROBLEM LIST:  59yFemale with HEALTH ISSUES - PROBLEM Dx:  Neck pain: Neck pain  abnormal chest ct    RECS:  -RUL opacity seen on CT chest 12/2019, nodular opacities/ground glass appears new.  Currently without respiratory symptoms.  No necessarily indicative of pneumonia as she is asymptomatic, but will require follow up.  -defer antibiotics to ID  -as per medicine/neurosurgery    Thank you for this consultation, please feel free to call with any questions [phone]  Heidy Gutierrez MD

## 2020-02-15 NOTE — PROGRESS NOTE ADULT - SUBJECTIVE AND OBJECTIVE BOX
ENT ISSUE/POD: Fevers and neck pain    HPI: 58y/o female with significant cardiac and orthopedic history with multiple spinal fusions. Patient had C-spine CT with outside report showing gas, implying erosion into esophagus or gas forming bacteria around the screw. Patient reports that she has been having fevers for two months, intermittently lasting about 8 hours each time and spontaneously resolving. Reports has been hoarse for many years. Patient was seen by Dr. Steward outpatient - patient was scoped and sent for CT C spine which showed ultimate result. CT was done 2 weeks ago and was advised to come to the ER for further evaluation, but patient did not have fever so did not feel she had to come in. Patient does report neck pain but no numbness weakness to UE's. No other focal deficit. Patient states she has persistent hoarseness, fever of 103 last Tuesday, profuse sweating and admits to food getting stuck in her throat. Denies SOB, odynophagia, chest pain.         PAST MEDICAL & SURGICAL HISTORY:  AICD (automatic cardioverter/defibrillator) present  Pseudoarthrosis of lumbar spine  Stented coronary artery: x 3 ESTEVAN  Degenerative disc disease, lumbar  PAD (peripheral artery disease): r leg 06  Hypercholesteremia  Reflux  Back pain  MI (myocardial infarction)  Arthritis  CAD (Coronary Artery Disease): CARDIAC CATH PTCA/[place] x 3 to SVG to RCA 08/2009  Atrial Fibrillation: paroxismal Dx in 08/2009  Elective surgery: Left leg &quot;clean out&quot; secondary PAD  H/O cervical spine surgery  History of back surgery: T10 - L4 posterior fusion  Elective surgery: Right leg art bypass 2006  S/P lumbar spine operation: laminectomy  Elective surgery: AICD 2006  S/P coronary artery stent placement: x 3  - 2010  S/P coronary artery bypass graft x 4: 2006    Allergies    No Known Allergies    Intolerances      MEDICATIONS  (STANDING):  aspirin 325 milliGRAM(s) Oral daily  atorvastatin 40 milliGRAM(s) Oral at bedtime  clopidogrel Tablet 75 milliGRAM(s) Oral daily  furosemide    Tablet 40 milliGRAM(s) Oral daily  heparin  Injectable 5000 Unit(s) SubCutaneous every 12 hours  losartan 50 milliGRAM(s) Oral two times a day  metoprolol succinate ER 50 milliGRAM(s) Oral two times a day  pantoprazole    Tablet 40 milliGRAM(s) Oral before breakfast  sodium chloride 0.9%. 1000 milliLiter(s) (75 mL/Hr) IV Continuous <Continuous>    MEDICATIONS  (PRN):  diazepam    Tablet 10 milliGRAM(s) Oral four times a day PRN anxiety  oxyCODONE    IR 30 milliGRAM(s) Oral four times a day PRN Severe Pain (7 - 10)      Social History: see consult    Family history: see consult    ROS:   ENT: all negative except as noted in HPI   Pulm: denies SOB, cough, hemoptysis  Neuro: denies numbness/tingling, loss of sensation  Endo: denies heat/cold intolerance, excessive sweating      Vital Signs Last 24 Hrs  T(C): 36.7 (15 Feb 2020 05:02), Max: 36.7 (14 Feb 2020 11:10)  T(F): 98.1 (15 Feb 2020 05:02), Max: 98.1 (15 Feb 2020 05:02)  HR: 65 (15 Feb 2020 05:02) (62 - 70)  BP: 117/68 (15 Feb 2020 05:02) (93/57 - 117/68)  BP(mean): --  RR: 16 (15 Feb 2020 05:02) (16 - 16)  SpO2: 97% (15 Feb 2020 05:02) (95% - 97%)                          11.2   5.79  )-----------( 265      ( 14 Feb 2020 17:22 )             33.0    02-14    138  |  99  |  22  ----------------------------<  101<H>  3.7   |  25  |  1.32<H>    Ca    9.9      14 Feb 2020 17:22    TPro  8.0  /  Alb  4.3  /  TBili  0.8  /  DBili  x   /  AST  13  /  ALT  9<L>  /  AlkPhos  86  02-13   PT/INR - ( 13 Feb 2020 15:51 )   PT: 12.3 sec;   INR: 1.08 ratio         PTT - ( 13 Feb 2020 15:51 )  PTT:28.1 sec    PHYSICAL EXAM:  Gen: NAD  Skin: No rashes, bruises, or lesions  Head: Normocephalic, Atraumatic  Face: no edema, erythema, or fluctuance. Parotid glands soft without mass  Eyes: no scleral injection  Nose: Nares bilaterally patent, no discharge  Mouth: No Stridor / Drooling / Trismus.  Mucosa moist, tongue/uvula midline, oropharynx clear  Neck: Flat, supple, no lymphadenopathy, trachea midline, no masses. No tenderness upon palpation. Well healed neck scar.   Lymphatic: No lymphadenopathy  Resp: breathing easily, no stridor  CV: no peripheral edema/cyanosis  GI: nondistended   Peripheral vascular: no JVD or edema  Neuro: facial nerve intact, no facial droop      CT Neck 1/31/20:     Status post ACDF at C5/C6 with redemonstration of fractured right C5 screw. Lucency about the bilateral C5 screws and mild anterior displacement of the superior endplate of the hardware.  Pocket of air/debris adjacent to the right side of the fixation plate and the retropharyngeal soft tissues, which may represent a fistulous connection anteriorly to the esophagus or airway though no definitive connection is visualized. Underlying infection is a consideration. Retropharyngeal air contacts the hardware, concerning for hardware infection.  Interval increase in groundglass opacity surrounding the right upper lobe airspace opacity, recommend dedicated chest CT.        XRay Esophagram 2/14/20:     IMPRESSION:  Unremarkable esophagram.    No extravasation of contrast to suggest esophageal perforation.

## 2020-02-15 NOTE — CONSULT NOTE ADULT - ASSESSMENT
59F with PMH/PSH including CAD s/p 4vCABG, MI, AICD, stents x 3 on Plavix and ASA, HLD, pAFib, chronic back pain, ACDF C5-6 (2002), T10-L5 posterior spinal fusion for lumbar disc disease with radiculopathy 6/19/14), hip osteoarthritis s/p total hip arthroplasty 7/13/15, s/p open posterior fusion of 2 or more  joints of posterior column of L vertebrae (11/5/15) admitted for fevers found to have retropharyngeal air at C5-6 associated with a fractured screw embedded in the prevertebral soft tissues    - Patient's clinical status is not consistent with esophageal perforation and furthermore, esophagram negative for leak (gastrograffin followed by barium) therefore there is a low likelihood of perforation  - Would consider empiric antibiotics however ID following recommending monitoring off abx for now  - Recommend that GI perform a diagnostic endoscopy to evaluate the cervical esophagus  - f/u ENT and NSGY recs    d/w Dr. Candie Tomlinson, PGY-2  Thoracic Surgery  Call [phone] with questions

## 2020-02-15 NOTE — PROGRESS NOTE ADULT - SUBJECTIVE AND OBJECTIVE BOX
Patient is a 59y old  Female who presents with a chief complaint of abnormal CT C spine (15 Feb 2020 12:55)      SUBJECTIVE / OVERNIGHT EVENTS: No new complaints.   Review of Systems  chest pain no  palpitations no  sob no  nausea no  headache no    MEDICATIONS  (STANDING):  aspirin 325 milliGRAM(s) Oral daily  atorvastatin 40 milliGRAM(s) Oral at bedtime  clopidogrel Tablet 75 milliGRAM(s) Oral daily  furosemide    Tablet 40 milliGRAM(s) Oral daily  heparin  Injectable 5000 Unit(s) SubCutaneous every 12 hours  losartan 50 milliGRAM(s) Oral two times a day  metoprolol succinate ER 50 milliGRAM(s) Oral two times a day  pantoprazole    Tablet 40 milliGRAM(s) Oral before breakfast  sodium chloride 0.9%. 1000 milliLiter(s) (75 mL/Hr) IV Continuous <Continuous>    MEDICATIONS  (PRN):  diazepam    Tablet 10 milliGRAM(s) Oral four times a day PRN anxiety  oxyCODONE    IR 30 milliGRAM(s) Oral every 6 hours PRN Severe Pain (7 - 10)      Vital Signs Last 24 Hrs  T(C): 36.8 (15 Feb 2020 16:29), Max: 37 (15 Feb 2020 08:30)  T(F): 98.2 (15 Feb 2020 16:29), Max: 98.6 (15 Feb 2020 08:30)  HR: 70 (15 Feb 2020 16:29) (64 - 75)  BP: 108/66 (15 Feb 2020 16:36) (94/65 - 117/68)  BP(mean): --  RR: 18 (15 Feb 2020 16:29) (16 - 18)  SpO2: 95% (15 Feb 2020 16:29) (95% - 97%)    PHYSICAL EXAM:  GENERAL: NAD  HEAD:  Atraumatic, Normocephalic  EYES: EOMI, PERRLA, conjunctiva and sclera clear  NECK: Supple, No JVD  CHEST/LUNG: Clear to auscultation bilaterally; No wheeze  HEART: Regular rate and rhythm; No murmurs, rubs, or gallops  ABDOMEN: Soft, Nontender, Nondistended; Bowel sounds present  EXTREMITIES:  2+ Peripheral Pulses, No clubbing, cyanosis, or edema  PSYCH: AAOx3  NEUROLOGY: non-focal  SKIN: No rashes or lesions    LABS:                        11.1   6.05  )-----------( 243      ( 15 Feb 2020 06:54 )             33.6     02-15    138  |  100  |  22  ----------------------------<  93  3.8   |  25  |  0.99    Ca    9.9      15 Feb 2020 06:54                Culture - Blood (collected 13 Feb 2020 20:46)  Source: .Blood Blood-Peripheral  Preliminary Report (14 Feb 2020 21:01):    No growth to date.    Culture - Blood (collected 13 Feb 2020 20:45)  Source: .Blood Blood-Peripheral  Preliminary Report (14 Feb 2020 21:01):    No growth to date.    Culture - Urine (collected 13 Feb 2020 18:08)  Source: .Urine Clean Catch (Midstream)  Final Report (14 Feb 2020 16:06):    No growth        RADIOLOGY & ADDITIONAL TESTS:    Imaging Personally Reviewed:    Consultant(s) Notes Reviewed:      Care Discussed with Consultants/Other Providers:

## 2020-02-16 DIAGNOSIS — K22.9 DISEASE OF ESOPHAGUS, UNSPECIFIED: ICD-10-CM

## 2020-02-16 PROCEDURE — 99232 SBSQ HOSP IP/OBS MODERATE 35: CPT

## 2020-02-16 RX ORDER — OXYCODONE HYDROCHLORIDE 5 MG/1
10 TABLET ORAL EVERY 12 HOURS
Refills: 0 | Status: DISCONTINUED | OUTPATIENT
Start: 2020-02-16 | End: 2020-02-23

## 2020-02-16 RX ORDER — LOSARTAN POTASSIUM 100 MG/1
50 TABLET, FILM COATED ORAL
Refills: 0 | Status: DISCONTINUED | OUTPATIENT
Start: 2020-02-16 | End: 2020-02-21

## 2020-02-16 RX ORDER — ACETAMINOPHEN 500 MG
650 TABLET ORAL ONCE
Refills: 0 | Status: COMPLETED | OUTPATIENT
Start: 2020-02-16 | End: 2020-02-16

## 2020-02-16 RX ORDER — OXYCODONE HYDROCHLORIDE 5 MG/1
30 TABLET ORAL EVERY 4 HOURS
Refills: 0 | Status: DISCONTINUED | OUTPATIENT
Start: 2020-02-16 | End: 2020-02-23

## 2020-02-16 RX ORDER — METOPROLOL TARTRATE 50 MG
50 TABLET ORAL
Refills: 0 | Status: DISCONTINUED | OUTPATIENT
Start: 2020-02-16 | End: 2020-02-21

## 2020-02-16 RX ADMIN — OXYCODONE HYDROCHLORIDE 30 MILLIGRAM(S): 5 TABLET ORAL at 17:19

## 2020-02-16 RX ADMIN — HEPARIN SODIUM 5000 UNIT(S): 5000 INJECTION INTRAVENOUS; SUBCUTANEOUS at 06:21

## 2020-02-16 RX ADMIN — Medication 10 MILLIGRAM(S): at 14:41

## 2020-02-16 RX ADMIN — OXYCODONE HYDROCHLORIDE 30 MILLIGRAM(S): 5 TABLET ORAL at 17:49

## 2020-02-16 RX ADMIN — Medication 50 MILLIGRAM(S): at 08:28

## 2020-02-16 RX ADMIN — OXYCODONE HYDROCHLORIDE 30 MILLIGRAM(S): 5 TABLET ORAL at 12:24

## 2020-02-16 RX ADMIN — OXYCODONE HYDROCHLORIDE 30 MILLIGRAM(S): 5 TABLET ORAL at 06:20

## 2020-02-16 RX ADMIN — OXYCODONE HYDROCHLORIDE 30 MILLIGRAM(S): 5 TABLET ORAL at 23:36

## 2020-02-16 RX ADMIN — LOSARTAN POTASSIUM 50 MILLIGRAM(S): 100 TABLET, FILM COATED ORAL at 06:19

## 2020-02-16 RX ADMIN — HEPARIN SODIUM 5000 UNIT(S): 5000 INJECTION INTRAVENOUS; SUBCUTANEOUS at 17:19

## 2020-02-16 RX ADMIN — Medication 650 MILLIGRAM(S): at 08:40

## 2020-02-16 RX ADMIN — OXYCODONE HYDROCHLORIDE 30 MILLIGRAM(S): 5 TABLET ORAL at 06:50

## 2020-02-16 RX ADMIN — Medication 325 MILLIGRAM(S): at 11:54

## 2020-02-16 RX ADMIN — ATORVASTATIN CALCIUM 40 MILLIGRAM(S): 80 TABLET, FILM COATED ORAL at 21:52

## 2020-02-16 RX ADMIN — Medication 40 MILLIGRAM(S): at 08:28

## 2020-02-16 RX ADMIN — Medication 10 MILLIGRAM(S): at 20:15

## 2020-02-16 RX ADMIN — OXYCODONE HYDROCHLORIDE 30 MILLIGRAM(S): 5 TABLET ORAL at 00:31

## 2020-02-16 RX ADMIN — SODIUM CHLORIDE 75 MILLILITER(S): 9 INJECTION INTRAMUSCULAR; INTRAVENOUS; SUBCUTANEOUS at 06:19

## 2020-02-16 RX ADMIN — OXYCODONE HYDROCHLORIDE 30 MILLIGRAM(S): 5 TABLET ORAL at 11:54

## 2020-02-16 RX ADMIN — OXYCODONE HYDROCHLORIDE 30 MILLIGRAM(S): 5 TABLET ORAL at 00:01

## 2020-02-16 RX ADMIN — PANTOPRAZOLE SODIUM 40 MILLIGRAM(S): 20 TABLET, DELAYED RELEASE ORAL at 06:19

## 2020-02-16 RX ADMIN — Medication 10 MILLIGRAM(S): at 08:30

## 2020-02-16 RX ADMIN — Medication 650 MILLIGRAM(S): at 09:10

## 2020-02-16 RX ADMIN — CLOPIDOGREL BISULFATE 75 MILLIGRAM(S): 75 TABLET, FILM COATED ORAL at 11:54

## 2020-02-16 NOTE — PROGRESS NOTE ADULT - ASSESSMENT
Paula, Lela  59F PMHx CAD s/p 4vCABG, MI, AICD, stents x3 on ASA plavix, HLD, afib, back pain s/p C5-C6 ACDF (2002) and T10-P fusion, sent in my PMD after ENT scoped and ordered CT c-spine which shows hardware and fusion failure at C5 with air around hardware, adjacent to esophagus. She has had intermittent fevers for 4 months but otherwise no complaints. CT L shows fused T10-P construct. Exam: neurointact, denies pain.  - No acute neurosurgical intervention  - Esophagram pending  - Plan pending results of esophagram and/or endoscopy

## 2020-02-16 NOTE — PROGRESS NOTE ADULT - SUBJECTIVE AND OBJECTIVE BOX
Patient seen and examined at bedside.    --Anticoagulation--  clopidogrel Tablet 75 milliGRAM(s) Oral daily  heparin  Injectable 5000 Unit(s) SubCutaneous every 12 hours    T(C): 36.8 (02-16-20 @ 08:00), Max: 36.8 (02-15-20 @ 13:35)  HR: 63 (02-16-20 @ 08:00) (54 - 75)  BP: 120/70 (02-16-20 @ 08:00) (91/53 - 120/70)  RR: 18 (02-16-20 @ 08:00) (18 - 18)  SpO2: 95% (02-16-20 @ 08:00) (95% - 99%)  Wt(kg): --    Exam:   AOx3, FC, PERRL, EOMI, no facial   5/5 throughout, no drift  SILT  no clonus  R neck/shoulder pain

## 2020-02-16 NOTE — PROGRESS NOTE ADULT - ASSESSMENT
59 f with    Abnormal hardware Cervical spine  - ID evaluation/ observe off antibiotics  - Neurosurgical follow  - pain control  - ESR, CRP    Possible esophageal fistula  - GI follow.  - EGD pending   - esophagogram with no perforation    AICD (automatic cardioverter/defibrillator) present   - EP check    Arthritis   - pain control    Atrial Fibrillation paroxismal Dx in 08/2009  rate control    Back pain   - pain control    CAD (Coronary Artery Disease)   - continue Rx  - cardiology evaluation     Hypercholesteremia   - continue Rx    PAD (peripheral artery disease) r leg 06  -stable    Lung opacities  - Pulmonary evaluation Dr. Gutierrez noted    d/w patient at length     Mega Zimmerman MD pager [phone]

## 2020-02-16 NOTE — PROGRESS NOTE ADULT - SUBJECTIVE AND OBJECTIVE BOX
Subjective " Hello I am feeling ok today ,up in room"      Vital Signs Last 24 Hrs  T(C): 36.8 (02-16-20 @ 08:00), Max: 36.8 (02-15-20 @ 13:35)  T(F): 98.2 (02-16-20 @ 08:00), Max: 98.2 (02-15-20 @ 13:35)  HR: 63 (02-16-20 @ 08:00) (54 - 75)  BP: 120/70 (02-16-20 @ 08:00) (91/53 - 120/70)  RR: 18 (02-16-20 @ 08:00) (18 - 18)  SpO2: 95% (02-16-20 @ 08:00) (95% - 99%)           02-15    138  |  100  |  22  ----------------------------<  93  3.8   |  25  |  0.99    Ca    9.9      15 Feb 2020 06:54    02-15 @ 07:01  -  02-16 @ 07:00  --------------------------------------------------------  IN: 3045 mL / OUT: 1050 mL / NET: 1995 mL                        11.1   6.05  )-----------( 243      ( 15 Feb 2020 06:54 )              33    MEDICATIONS  (STANDING):  aspirin 325 milliGRAM(s) Oral daily  atorvastatin 40 milliGRAM(s) Oral at bedtime  clopidogrel Tablet 75 milliGRAM(s) Oral daily  furosemide    Tablet 40 milliGRAM(s) Oral daily  heparin  Injectable 5000 Unit(s) SubCutaneous every 12 hours  losartan 50 milliGRAM(s) Oral two times a day  metoprolol succinate ER 50 milliGRAM(s) Oral two times a day  pantoprazole    Tablet 40 milliGRAM(s) Oral before breakfast  sodium chloride 0.9%. 1000 milliLiter(s) (75 mL/Hr) IV Continuous <Continuous>    MEDICATIONS  (PRN):  diazepam    Tablet 10 milliGRAM(s) Oral four times a day PRN anxiety  oxyCODONE    IR 30 milliGRAM(s) Oral every 6 hours PRN Severe Pain (7 - 10)                          PHYSICAL EXAM  Neurology: alert and oriented x 3, nonfocal, no gross deficits    CV :S1 S2 RRR  Lungs: b/ l breath sounds on room air    Abdomen: soft, nontender, nondistended, positive bowel sounds,    :    voids           Extremities:   warm well perfused  equal strength throughout                                           Discussed with Cardiothoracic Team at AM rounds.

## 2020-02-16 NOTE — PROGRESS NOTE ADULT - SUBJECTIVE AND OBJECTIVE BOX
Patient is a 59y old  Female who presents with a chief complaint of abnormal CT C spine (16 Feb 2020 11:24)      SUBJECTIVE / OVERNIGHT EVENTS: Comfortable without new complaints.   Review of Systems  chest pain no  palpitations no  sob no  nausea no  headache no    MEDICATIONS  (STANDING):  aspirin 325 milliGRAM(s) Oral daily  atorvastatin 40 milliGRAM(s) Oral at bedtime  clopidogrel Tablet 75 milliGRAM(s) Oral daily  furosemide    Tablet 40 milliGRAM(s) Oral daily  heparin  Injectable 5000 Unit(s) SubCutaneous every 12 hours  losartan 50 milliGRAM(s) Oral two times a day  metoprolol succinate ER 50 milliGRAM(s) Oral two times a day  oxyCODONE  ER Tablet 10 milliGRAM(s) Oral every 12 hours  pantoprazole    Tablet 40 milliGRAM(s) Oral before breakfast  sodium chloride 0.9%. 1000 milliLiter(s) (75 mL/Hr) IV Continuous <Continuous>    MEDICATIONS  (PRN):  diazepam    Tablet 10 milliGRAM(s) Oral four times a day PRN anxiety  oxyCODONE    IR 30 milliGRAM(s) Oral every 6 hours PRN Severe Pain (7 - 10)      Vital Signs Last 24 Hrs  T(C): 36.8 (16 Feb 2020 17:13), Max: 36.8 (16 Feb 2020 08:00)  T(F): 98.3 (16 Feb 2020 17:13), Max: 98.3 (16 Feb 2020 17:13)  HR: 68 (16 Feb 2020 17:13) (54 - 68)  BP: 98/61 (16 Feb 2020 17:13) (91/53 - 120/70)  BP(mean): --  RR: 18 (16 Feb 2020 17:13) (18 - 18)  SpO2: 98% (16 Feb 2020 17:13) (95% - 99%)    PHYSICAL EXAM:  GENERAL: NAD, well-developed  HEAD:  Atraumatic, Normocephalic  EYES: EOMI, PERRLA, conjunctiva and sclera clear  NECK: Supple, No JVD  CHEST/LUNG: Clear to auscultation bilaterally; No wheeze  HEART: Regular rate and rhythm; No murmurs, rubs, or gallops  ABDOMEN: Soft, Nontender, Nondistended; Bowel sounds present  EXTREMITIES:  2+ Peripheral Pulses, No clubbing, cyanosis, or edema  PSYCH: AAOx3  NEUROLOGY: non-focal  SKIN: No rashes or lesions    LABS:                        11.1   6.05  )-----------( 243      ( 15 Feb 2020 06:54 )             33.6     02-15    138  |  100  |  22  ----------------------------<  93  3.8   |  25  |  0.99    Ca    9.9      15 Feb 2020 06:54                Culture - Blood (collected 13 Feb 2020 20:46)  Source: .Blood Blood-Peripheral  Preliminary Report (14 Feb 2020 21:01):    No growth to date.    Culture - Blood (collected 13 Feb 2020 20:45)  Source: .Blood Blood-Peripheral  Preliminary Report (14 Feb 2020 21:01):    No growth to date.        RADIOLOGY & ADDITIONAL TESTS:    Imaging Personally Reviewed:    Consultant(s) Notes Reviewed:      Care Discussed with Consultants/Other Providers:

## 2020-02-16 NOTE — PROGRESS NOTE ADULT - ASSESSMENT
59F with PMH/PSH including CAD s/p 4vCABG, MI, AICD, stents x 3 on Plavix and ASA, HLD, pAFib, chronic back pain, ACDF C5-6 (2002), T10-L5 posterior spinal fusion for lumbar disc disease with radiculopathy 6/19/14), hip osteoarthritis s/p total hip arthroplasty 7/13/15, s/p open posterior fusion of 2 or more  joints of posterior column of L vertebrae (11/5/15) admitted for fevers found to have retropharyngeal air at C5-6 associated with a fractured screw embedded in the prevertebral soft tissues  Patient's clinical status is not consistent with esophageal perforation and furthermore, esophagram negative for leak (gastrograffin followed by barium) therefore there is a low likelihood of perforation  2/16 VSS ambulating  seen and examined no acute distress EGD for Tuesday

## 2020-02-17 PROCEDURE — 99232 SBSQ HOSP IP/OBS MODERATE 35: CPT

## 2020-02-17 RX ADMIN — OXYCODONE HYDROCHLORIDE 30 MILLIGRAM(S): 5 TABLET ORAL at 17:55

## 2020-02-17 RX ADMIN — ATORVASTATIN CALCIUM 40 MILLIGRAM(S): 80 TABLET, FILM COATED ORAL at 21:08

## 2020-02-17 RX ADMIN — OXYCODONE HYDROCHLORIDE 30 MILLIGRAM(S): 5 TABLET ORAL at 22:05

## 2020-02-17 RX ADMIN — HEPARIN SODIUM 5000 UNIT(S): 5000 INJECTION INTRAVENOUS; SUBCUTANEOUS at 17:35

## 2020-02-17 RX ADMIN — Medication 40 MILLIGRAM(S): at 05:26

## 2020-02-17 RX ADMIN — Medication 10 MILLIGRAM(S): at 23:08

## 2020-02-17 RX ADMIN — OXYCODONE HYDROCHLORIDE 30 MILLIGRAM(S): 5 TABLET ORAL at 18:36

## 2020-02-17 RX ADMIN — OXYCODONE HYDROCHLORIDE 30 MILLIGRAM(S): 5 TABLET ORAL at 00:06

## 2020-02-17 RX ADMIN — OXYCODONE HYDROCHLORIDE 30 MILLIGRAM(S): 5 TABLET ORAL at 14:00

## 2020-02-17 RX ADMIN — OXYCODONE HYDROCHLORIDE 30 MILLIGRAM(S): 5 TABLET ORAL at 22:50

## 2020-02-17 RX ADMIN — Medication 325 MILLIGRAM(S): at 12:12

## 2020-02-17 RX ADMIN — Medication 50 MILLIGRAM(S): at 18:23

## 2020-02-17 RX ADMIN — OXYCODONE HYDROCHLORIDE 30 MILLIGRAM(S): 5 TABLET ORAL at 09:31

## 2020-02-17 RX ADMIN — Medication 50 MILLIGRAM(S): at 05:26

## 2020-02-17 RX ADMIN — LOSARTAN POTASSIUM 50 MILLIGRAM(S): 100 TABLET, FILM COATED ORAL at 05:26

## 2020-02-17 RX ADMIN — Medication 10 MILLIGRAM(S): at 12:12

## 2020-02-17 RX ADMIN — HEPARIN SODIUM 5000 UNIT(S): 5000 INJECTION INTRAVENOUS; SUBCUTANEOUS at 05:26

## 2020-02-17 RX ADMIN — CLOPIDOGREL BISULFATE 75 MILLIGRAM(S): 75 TABLET, FILM COATED ORAL at 12:12

## 2020-02-17 RX ADMIN — OXYCODONE HYDROCHLORIDE 30 MILLIGRAM(S): 5 TABLET ORAL at 13:32

## 2020-02-17 RX ADMIN — PANTOPRAZOLE SODIUM 40 MILLIGRAM(S): 20 TABLET, DELAYED RELEASE ORAL at 05:26

## 2020-02-17 RX ADMIN — Medication 10 MILLIGRAM(S): at 05:25

## 2020-02-17 RX ADMIN — OXYCODONE HYDROCHLORIDE 30 MILLIGRAM(S): 5 TABLET ORAL at 03:11

## 2020-02-17 RX ADMIN — OXYCODONE HYDROCHLORIDE 30 MILLIGRAM(S): 5 TABLET ORAL at 10:00

## 2020-02-17 RX ADMIN — OXYCODONE HYDROCHLORIDE 30 MILLIGRAM(S): 5 TABLET ORAL at 03:41

## 2020-02-17 NOTE — PROGRESS NOTE ADULT - SUBJECTIVE AND OBJECTIVE BOX
Patient is a 59y old  Female who presents with a chief complaint of abnormal CT C spine (17 Feb 2020 10:59)      SUBJECTIVE / OVERNIGHT EVENTS: No new complaints.   Review of Systems  chest pain no  palpitations no  sob no  nausea no  headache no    MEDICATIONS  (STANDING):  aspirin 325 milliGRAM(s) Oral daily  atorvastatin 40 milliGRAM(s) Oral at bedtime  clopidogrel Tablet 75 milliGRAM(s) Oral daily  furosemide    Tablet 40 milliGRAM(s) Oral daily  heparin  Injectable 5000 Unit(s) SubCutaneous every 12 hours  losartan 50 milliGRAM(s) Oral two times a day  metoprolol succinate ER 50 milliGRAM(s) Oral two times a day  oxyCODONE  ER Tablet 10 milliGRAM(s) Oral every 12 hours  pantoprazole    Tablet 40 milliGRAM(s) Oral before breakfast  sodium chloride 0.9%. 1000 milliLiter(s) (75 mL/Hr) IV Continuous <Continuous>    MEDICATIONS  (PRN):  diazepam    Tablet 10 milliGRAM(s) Oral four times a day PRN anxiety  oxyCODONE    IR 30 milliGRAM(s) Oral every 4 hours PRN Severe Pain (7 - 10)      Vital Signs Last 24 Hrs  T(C): 36.6 (17 Feb 2020 16:29), Max: 37.1 (16 Feb 2020 20:56)  T(F): 97.8 (17 Feb 2020 16:29), Max: 98.7 (16 Feb 2020 20:56)  HR: 65 (17 Feb 2020 18:21) (62 - 81)  BP: 108/69 (17 Feb 2020 18:21) (100/69 - 131/74)  BP(mean): --  RR: 18 (17 Feb 2020 16:29) (18 - 18)  SpO2: 99% (17 Feb 2020 16:29) (95% - 99%)    PHYSICAL EXAM:  GENERAL: NAD, well-developed  HEAD:  Atraumatic, Normocephalic  EYES: EOMI, PERRLA, conjunctiva and sclera clear  NECK: Supple, No JVD  CHEST/LUNG: Clear to auscultation bilaterally; No wheeze  HEART: Regular rate and rhythm; No murmurs, rubs, or gallops  ABDOMEN: Soft, Nontender, Nondistended; Bowel sounds present  EXTREMITIES:  2+ Peripheral Pulses, No clubbing, cyanosis, or edema  PSYCH: AAOx3  NEUROLOGY: non-focal  SKIN: No rashes or lesions    LABS:                      RADIOLOGY & ADDITIONAL TESTS:    Imaging Personally Reviewed:    Consultant(s) Notes Reviewed:      Care Discussed with Consultants/Other Providers:

## 2020-02-17 NOTE — PROGRESS NOTE ADULT - ASSESSMENT
59F with PMH/PSH including CAD s/p 4vCABG, MI, AICD, stents x 3 on Plavix and ASA, HLD, pAFib, chronic back pain, ACDF C5-6 (2002), T10-L5 posterior spinal fusion for lumbar disc disease with radiculopathy 6/19/14), hip osteoarthritis s/p total hip arthroplasty 7/13/15, s/p open posterior fusion of 2 or more  joints of posterior column of L vertebrae (11/5/15) admitted for fevers found to have retropharyngeal air at C5-6 associated with a fractured screw embedded in the prevertebral soft tissues  Patient's clinical status is not consistent with esophageal perforation and furthermore, esophagram negative for leak (gastrograffin followed by barium) therefore there is a low likelihood of perforation  2/16 VSS ambulating  seen and examined no acute distress EGD for Tuesday 2/17 VSS, pt showered this AM, plan for EGD in AM with GI. 59F with PMH/PSH including CAD s/p 4vCABG, MI, AICD, stents x 3 on Plavix and ASA, HLD, pAFib, chronic back pain, ACDF C5-6 (2002), T10-L5 posterior spinal fusion for lumbar disc disease with radiculopathy 6/19/14), hip osteoarthritis s/p total hip arthroplasty 7/13/15, s/p open posterior fusion of 2 or more  joints of posterior column of L vertebrae (11/5/15) admitted for fevers found to have retropharyngeal air at C5-6 associated with a fractured screw embedded in the prevertebral soft tissues  Patient's clinical status is not consistent with esophageal perforation and furthermore, esophagram negative for leak (gastrograffin followed by barium) therefore there is a low likelihood of perforation  2/16 VSS ambulating  seen and examined no acute distress EGD for Tuesday 2/17 VSS, afebrile, pt showered this AM, plan for EGD in AM with GI.

## 2020-02-17 NOTE — PROGRESS NOTE ADULT - SUBJECTIVE AND OBJECTIVE BOX
Patient is a 59y old  Female who presents with a chief complaint of abnormal CT C spine (17 Feb 2020 07:11)    Being followed by ID for perivertebral air at fusion site    Interval history:  Esophagram without evidence of esophageal perforation  No acute events      ROS:  Neck and shoulder pain  No cough, SOB, CP  No N/V/D./abd pain  No other complaints      Antimicrobials:        Vital Signs Last 24 Hrs  T(C): 36.7 (02-17-20 @ 04:47), Max: 37.1 (02-16-20 @ 20:56)  T(F): 98 (02-17-20 @ 04:47), Max: 98.7 (02-16-20 @ 20:56)  HR: 73 (02-17-20 @ 04:47) (60 - 73)  BP: 131/74 (02-17-20 @ 04:47) (98/55 - 131/74)  BP(mean): --  RR: 18 (02-17-20 @ 04:47) (18 - 18)  SpO2: 95% (02-17-20 @ 04:47) (95% - 98%)    Physical Exam:    Constitutional well preserved, comfortable    HEENT PERRLA EOMI, No pallor or icterus    No oral exudate or erythema    Neck supple no JVD or LN    Chest Clear to auscultation, AICD site non-tender    CVS RRR S1 S2 WNl No murmur or rub or gallop    Abd soft BS normal No tenderness no masses    Ext No cyanosis clubbing or edema    IV site no erythema tenderness or discharge    Joints no swelling or LOM    CNS AAO X 3 non focal    Lab Data:    Sedimentation Rate, Erythrocyte (02.15.20 @ 09:25)    Sedimentation Rate, Erythrocyte: 92 mm/hr    .Blood Blood-Peripheral  02-13-20   No growth to date.  --  --      .Blood Blood-Peripheral  02-13-20   No growth to date.  --  --      .Urine Clean Catch (Midstream)  02-13-20   No growth  --  --      < from: Xray Esophagram Single Contrast (02.14.20 @ 18:22) >  EXAM:  XR ESOPH SNGL CON STUDY                            PROCEDURE DATE:  02/14/2020            INTERPRETATION:  CLINICAL INFORMATION: Status post cervical spine fusion with focus of retropharyngeal air on CT. Concern for esophageal injury    COMPARISON: CT neck 2/13/2020 and CT chest 2/14/2020     TECHNIQUE: Single contrast esophagram.    TOTAL FLUOROSCOPY TIME: 2.1 minutes.    FINDINGS:   A  view of the chest demonstrates a left-sided AICD, status post sternotomy. The cardiac silhouetteis within normal limits. The lungs are clear. Cervical spine fusion hardware and partially visualized thoracolumbar spine fusion hardware with otherwise unremarkable bones and soft tissues.    The patient was given water-soluble contrast followed by a liquid barium suspension and fluoroscopic spot films were obtained.     The esophagus showed normal contour and motility. No extravasation of contrast identified.    Contrast passed unimpeded through the gastroesophageal junction into a normal appearing stomach.     IMPRESSION:  Unremarkable esophagram.    No extravasation of contrast to suggest esophageal perforation.    < end of copied text >

## 2020-02-17 NOTE — PROGRESS NOTE ADULT - ASSESSMENT
59 f with    Abnormal hardware Cervical spine  - ID evaluation/ observe off antibiotics  - Neurosurgical follow  - pain control  - ESR, CRP    Possible esophageal fistula  - GI follow.  - EGD pending   - esophagogram with no perforation    AICD (automatic cardioverter/defibrillator) present   - EP check    Arthritis   - pain control    Atrial Fibrillation paroxismal Dx in 08/2009  rate control    Back pain   - pain control    CAD (Coronary Artery Disease)   - continue Rx  - cardiology evaluation     Hypercholesteremia   - continue Rx    PAD (peripheral artery disease) r leg 06  -stable    Lung opacities  - Pulmonary evaluation Dr. Gutierrez noted    d/w patient at length     Mega Zimmerman MD pager [phone]

## 2020-02-17 NOTE — PROGRESS NOTE ADULT - SUBJECTIVE AND OBJECTIVE BOX
Cardiovascular Disease Progress Note    Overnight events: No acute events overnight.  awaiting egd. no new cardiac sx  Otherwise review of systems negative    Objective Findings:  T(C): 36.7 (02-17-20 @ 04:47), Max: 37.1 (02-16-20 @ 20:56)  HR: 73 (02-17-20 @ 04:47) (60 - 73)  BP: 131/74 (02-17-20 @ 04:47) (98/55 - 131/74)  RR: 18 (02-17-20 @ 04:47) (18 - 18)  SpO2: 95% (02-17-20 @ 04:47) (95% - 98%)  Wt(kg): --  Daily     Daily       Physical Exam:  Gen: NAD  HEENT: EOMI  CV: RRR, normal S1 + S2, no m/r/g  Lungs: CTAB  Abd: soft, non-tender  Ext: No edema    Telemetry:    Laboratory Data:                    Inpatient Medications:  MEDICATIONS  (STANDING):  aspirin 325 milliGRAM(s) Oral daily  atorvastatin 40 milliGRAM(s) Oral at bedtime  clopidogrel Tablet 75 milliGRAM(s) Oral daily  furosemide    Tablet 40 milliGRAM(s) Oral daily  heparin  Injectable 5000 Unit(s) SubCutaneous every 12 hours  losartan 50 milliGRAM(s) Oral two times a day  metoprolol succinate ER 50 milliGRAM(s) Oral two times a day  oxyCODONE  ER Tablet 10 milliGRAM(s) Oral every 12 hours  pantoprazole    Tablet 40 milliGRAM(s) Oral before breakfast  sodium chloride 0.9%. 1000 milliLiter(s) (75 mL/Hr) IV Continuous <Continuous>      Assessment:  -CAD s/p 4vCABG and PCI  -ICM s/p AICD   -HLD  -pAFib  -chronic back pain s/p T10-L5 posterior spinal fusion for lumbar disc disease with radiculopathy   -? C spine with gas, implying erosion into esophagus or gas forming bacteria around the screw    Recs:  -c/w metop and losartan for gdmt for lv dysfunction and anti-anginal/anti-arrhythmic effect. asx episodes of sinus hany, would c/w beta blockers for now. has ICD (backup pacing) -> can check device for parameters and to ensure functioning appropriately  -c/w lasix 40mg. appears euvolemic  -c/w dapt and statin for cad/stents  -hx of pAF not on ac, remains on high dose asa and plavix  -multispecialty services appreciated - neuro, gi and ent  -s/p neg esophagram  -s/p ct chest. pulm consult appreciated  -awaiting egd. is medically optimized and can proceed without further cardiac workup  -care per medicine        Over 25 minutes spent on total encounter; more than 50% of the visit was spent counseling and/or coordinating care by the attending physician.      Ministerio Dupont MD   Cardiovascular Disease  [phone]

## 2020-02-17 NOTE — PROGRESS NOTE ADULT - SUBJECTIVE AND OBJECTIVE BOX
Pulmonary Consult Note    MADELINE HUERTA  MRN-[id number]    Chief Complaint: Patient is a 59y old  Female who presents with a chief complaint of abnormal CT C spine (15 Feb 2020 11:42)      HPI:  no complaints this AM  no dyspnea, cp, cough      ROS:  All other systems reviewed and negative    SOCIAL HISTORY: former smoker, quit 8 months ago      MEDICATIONS  (STANDING):  aspirin 325 milliGRAM(s) Oral daily  atorvastatin 40 milliGRAM(s) Oral at bedtime  clopidogrel Tablet 75 milliGRAM(s) Oral daily  furosemide    Tablet 40 milliGRAM(s) Oral daily  heparin  Injectable 5000 Unit(s) SubCutaneous every 12 hours  losartan 50 milliGRAM(s) Oral two times a day  metoprolol succinate ER 50 milliGRAM(s) Oral two times a day  oxyCODONE  ER Tablet 10 milliGRAM(s) Oral every 12 hours  pantoprazole    Tablet 40 milliGRAM(s) Oral before breakfast  sodium chloride 0.9%. 1000 milliLiter(s) (75 mL/Hr) IV Continuous <Continuous>    MEDICATIONS  (PRN):  diazepam    Tablet 10 milliGRAM(s) Oral four times a day PRN anxiety  oxyCODONE    IR 30 milliGRAM(s) Oral every 4 hours PRN Severe Pain (7 - 10)      EXAM:  Vital Signs Last 24 Hrs  T(C): 36.9 (17 Feb 2020 08:00), Max: 37.1 (16 Feb 2020 20:56)  T(F): 98.4 (17 Feb 2020 08:00), Max: 98.7 (16 Feb 2020 20:56)  HR: 67 (17 Feb 2020 08:00) (60 - 73)  BP: 100/69 (17 Feb 2020 08:00) (98/55 - 131/74)  BP(mean): --  RR: 18 (17 Feb 2020 08:00) (18 - 18)  SpO2: 95% (17 Feb 2020 08:00) (95% - 98%)  GENERAL: No acute distress  NEURO: Alert and oriented x 3  LUNGS: Clear to auscultation bilaterally, no rales or wheezes  CV: S1/S2, no murmur  ABDOMEN: +BS, nontender  EXTREMITIES: no clubbing, cyanosis, edema    LABS/IMAGING: reviewed                 < from: CT Chest No Cont (02.14.20 @ 13:44) >  EXAM:  CT CHEST                            PROCEDURE DATE:  02/14/2020            INTERPRETATION:  CLINICAL INFORMATION: Cervical spine fusion hardware, sepsis, abnormal chest radiograph    COMPARISON: Chest radiograph 2/13/2020, chest CT from 2/14/2019    PROCEDURE:   CT of the Chest was performed without intravenous contrast.  Sagittal and coronal reformats were performed.      FINDINGS:    There is new right upper lobe groundglass opacities. New subpleural nodules along the minor and major fissures in the right middle lobe and the right upper lobe.    Irregularly-shaped right apical opacity is increased in size measuring 3.4 cm, with adjacent nodule and groundglass opacities. The nodule measures 0.5 cm (3, 26).     No pleural effusion.    Left chest wall AICD with leads in the right atrium and right ventricle. The heart is normal in size. Status post CABG. There is no pericardial effusion. Coronary artery calcifications are present. The main pulmonary artery is normal in caliber. Theaorta is normal in course and caliber.    Mediastinal lymph nodes are unchanged.    Evaluation below the diaphragm demonstrates hyperintensity within the gallbladder, likely representing vicarious excretion of contrast. Contrast is also seen within the bilateral kidneys, which may be from prior contrast enhanced study 2/13/2020, which can be seen in the setting of acute kidney injury.    Status post median sternotomy. Cervical spinal fusion hardware is partially visualized. Status post lumbar spinal fusion hardware, partially visualized spanning from T10 to the visualized portions of L3. The known C5 vertebral body screw fracture and retropharyngeal air is better evaluated on CT neck 2/13/2020.    IMPRESSION:     1.  Patchy groundglass opacities in the right upper lobe, right middle lobe, and the right lower lobe are new since prior exam. These findings are nonspecific. Recommend follow-up in 6 - 8 weeks.    < end of copied text >      PROBLEM LIST:  59yFemale with HEALTH ISSUES - PROBLEM Dx:  Neck pain: Neck pain  abnormal chest ct    RECS:  -RUL opacity seen on CT chest 12/2019, nodular opacities/ground glass appears new.  Currently without respiratory symptoms.  No necessarily indicative of pneumonia as she is asymptomatic, but will require follow up.  -defer antibiotics to ID  -as per medicine/neurosurgery  -no pulmonary contraindication to proposed upcoming procedures.  -PRN duonebs for any dyspnea/cough     please feel free to call with any questions [phone]  Heidy Gutierrez MD

## 2020-02-17 NOTE — CHART NOTE - NSCHARTNOTEFT_GEN_A_CORE
Esophagram negative, rec f/u with endoscopy.     Patient stable, in NAD, afebrile. No acute neurosurgical intervention, patient should follow up with outside spine surgeon ([facility]) for evaluation and potential revision of hardware. Will s/o at this time.

## 2020-02-17 NOTE — PROGRESS NOTE ADULT - ASSESSMENT
59 F PMHx of CAD, Afib with PPM, and multiple spinal surgeries with hardware who presented to the ED with fevers  Subjective fever, no leukocytosis  Patient very well appearing, does not appear toxic, no complaints aside from neck pain  CT spine with evidence of hardware loosening, also associated air concerning for possible R sided fistula and air/debris  Aside from subjective fever, no signs systemic infection  Despite this, still significant concern for radiographic findings suspicious for infection vs hardware loosening  New CT with no significant change  Uncertain infection presently considering stability  Overall,  1) Abnormal Finding on Imaging  - Monitor off abx for now  - BC NGTD  - Elevated ESR/CRP  - PCT not c/w systemic infection  - Unlikely esphageal perforation based on esophagram/symptoms  2) Hardware Loosening  - F/U neurosurgery  - If not able to tolerate MRI, possible role for tagged WBC  3) Fever  - Monitor for further fevers- none to date  - If signs sepsis or worsening, would start Vanco/Zosyn (low threshold)  4) Abnormal CXR  - note R sided opacities- unclear significance  - Flu/RSV neg    Jerod Culp MD  pager [phone]  office [phone]  After 5pm and on weekends call [phone]

## 2020-02-17 NOTE — CHART NOTE - NSCHARTNOTEFT_GEN_A_CORE
called by RN stating Pt's /58 ,Pt due for Losartan and  Toprol xl   Pt seen and examined no c/o dizziness   Reviewed  cardiology note will c/w Toprol  for now , Hold Losartan PM dose   DR Zimmerman aware  and agree with above  ruth segura NP-C

## 2020-02-17 NOTE — PROGRESS NOTE ADULT - SUBJECTIVE AND OBJECTIVE BOX
Patient is a 59y old  Female who presents with a chief complaint of abnormal CT C spine (17 Feb 2020 10:27)      Vital Signs Last 24 Hrs  T(C): 36.9 (02-17-20 @ 08:00), Max: 37.1 (02-16-20 @ 20:56)  T(F): 98.4 (02-17-20 @ 08:00), Max: 98.7 (02-16-20 @ 20:56)  HR: 67 (02-17-20 @ 08:00) (60 - 73)  BP: 100/69 (02-17-20 @ 08:00) (98/55 - 131/74)  RR: 18 (02-17-20 @ 08:00) (18 - 18)  SpO2: 95% (02-17-20 @ 08:00) (95% - 98%)           02-16-20 @ 07:01  -  02-17-20 @ 07:00  --------------------------------------------------------  IN: 960 mL / OUT: 825 mL / NET: 135 mL      PHYSICAL EXAM  Neurology: A&Ox3, NAD  CV : RRR+S1S2  Lungs: Respirations non-labored, B/L BS CTA  Abdomen: Soft, NT/ND, +BSx4Q  Extremities: B/L LE warm, no edema, +PP             MEDICATIONS  aspirin 325 milliGRAM(s) Oral daily  atorvastatin 40 milliGRAM(s) Oral at bedtime  clopidogrel Tablet 75 milliGRAM(s) Oral daily  diazepam    Tablet 10 milliGRAM(s) Oral four times a day PRN  furosemide    Tablet 40 milliGRAM(s) Oral daily  heparin  Injectable 5000 Unit(s) SubCutaneous every 12 hours  losartan 50 milliGRAM(s) Oral two times a day  metoprolol succinate ER 50 milliGRAM(s) Oral two times a day  oxyCODONE    IR 30 milliGRAM(s) Oral every 4 hours PRN  oxyCODONE  ER Tablet 10 milliGRAM(s) Oral every 12 hours  pantoprazole    Tablet 40 milliGRAM(s) Oral before breakfast  sodium chloride 0.9%. 1000 milliLiter(s) IV Continuous <Continuous>

## 2020-02-18 LAB
BLD GP AB SCN SERPL QL: NEGATIVE — SIGNIFICANT CHANGE UP
CULTURE RESULTS: SIGNIFICANT CHANGE UP
CULTURE RESULTS: SIGNIFICANT CHANGE UP
HCT VFR BLD CALC: 31.9 % — LOW (ref 34.5–45)
HGB BLD-MCNC: 10.6 G/DL — LOW (ref 11.5–15.5)
MCHC RBC-ENTMCNC: 33.2 GM/DL — SIGNIFICANT CHANGE UP (ref 32–36)
MCHC RBC-ENTMCNC: 34.3 PG — HIGH (ref 27–34)
MCV RBC AUTO: 103.2 FL — HIGH (ref 80–100)
NRBC # BLD: 0 /100 WBCS — SIGNIFICANT CHANGE UP (ref 0–0)
PLATELET # BLD AUTO: 263 K/UL — SIGNIFICANT CHANGE UP (ref 150–400)
RBC # BLD: 3.09 M/UL — LOW (ref 3.8–5.2)
RBC # FLD: 13.5 % — SIGNIFICANT CHANGE UP (ref 10.3–14.5)
RH IG SCN BLD-IMP: POSITIVE — SIGNIFICANT CHANGE UP
SPECIMEN SOURCE: SIGNIFICANT CHANGE UP
SPECIMEN SOURCE: SIGNIFICANT CHANGE UP
WBC # BLD: 4.55 K/UL — SIGNIFICANT CHANGE UP (ref 3.8–10.5)
WBC # FLD AUTO: 4.55 K/UL — SIGNIFICANT CHANGE UP (ref 3.8–10.5)

## 2020-02-18 PROCEDURE — 43235 EGD DIAGNOSTIC BRUSH WASH: CPT | Mod: GC

## 2020-02-18 PROCEDURE — 99232 SBSQ HOSP IP/OBS MODERATE 35: CPT

## 2020-02-18 PROCEDURE — 99233 SBSQ HOSP IP/OBS HIGH 50: CPT

## 2020-02-18 RX ADMIN — Medication 10 MILLIGRAM(S): at 11:49

## 2020-02-18 RX ADMIN — OXYCODONE HYDROCHLORIDE 10 MILLIGRAM(S): 5 TABLET ORAL at 18:16

## 2020-02-18 RX ADMIN — HEPARIN SODIUM 5000 UNIT(S): 5000 INJECTION INTRAVENOUS; SUBCUTANEOUS at 05:11

## 2020-02-18 RX ADMIN — ATORVASTATIN CALCIUM 40 MILLIGRAM(S): 80 TABLET, FILM COATED ORAL at 21:05

## 2020-02-18 RX ADMIN — OXYCODONE HYDROCHLORIDE 30 MILLIGRAM(S): 5 TABLET ORAL at 03:43

## 2020-02-18 RX ADMIN — OXYCODONE HYDROCHLORIDE 30 MILLIGRAM(S): 5 TABLET ORAL at 09:55

## 2020-02-18 RX ADMIN — OXYCODONE HYDROCHLORIDE 30 MILLIGRAM(S): 5 TABLET ORAL at 16:19

## 2020-02-18 RX ADMIN — OXYCODONE HYDROCHLORIDE 30 MILLIGRAM(S): 5 TABLET ORAL at 16:49

## 2020-02-18 RX ADMIN — HEPARIN SODIUM 5000 UNIT(S): 5000 INJECTION INTRAVENOUS; SUBCUTANEOUS at 17:23

## 2020-02-18 RX ADMIN — OXYCODONE HYDROCHLORIDE 30 MILLIGRAM(S): 5 TABLET ORAL at 09:25

## 2020-02-18 RX ADMIN — LOSARTAN POTASSIUM 50 MILLIGRAM(S): 100 TABLET, FILM COATED ORAL at 17:23

## 2020-02-18 RX ADMIN — OXYCODONE HYDROCHLORIDE 10 MILLIGRAM(S): 5 TABLET ORAL at 07:11

## 2020-02-18 RX ADMIN — OXYCODONE HYDROCHLORIDE 10 MILLIGRAM(S): 5 TABLET ORAL at 17:23

## 2020-02-18 RX ADMIN — Medication 40 MILLIGRAM(S): at 09:25

## 2020-02-18 RX ADMIN — PANTOPRAZOLE SODIUM 40 MILLIGRAM(S): 20 TABLET, DELAYED RELEASE ORAL at 05:11

## 2020-02-18 RX ADMIN — OXYCODONE HYDROCHLORIDE 30 MILLIGRAM(S): 5 TABLET ORAL at 21:05

## 2020-02-18 RX ADMIN — Medication 10 MILLIGRAM(S): at 17:23

## 2020-02-18 RX ADMIN — OXYCODONE HYDROCHLORIDE 10 MILLIGRAM(S): 5 TABLET ORAL at 06:38

## 2020-02-18 RX ADMIN — Medication 50 MILLIGRAM(S): at 17:23

## 2020-02-18 RX ADMIN — OXYCODONE HYDROCHLORIDE 30 MILLIGRAM(S): 5 TABLET ORAL at 04:20

## 2020-02-18 RX ADMIN — Medication 325 MILLIGRAM(S): at 12:05

## 2020-02-18 RX ADMIN — Medication 10 MILLIGRAM(S): at 05:11

## 2020-02-18 RX ADMIN — CLOPIDOGREL BISULFATE 75 MILLIGRAM(S): 75 TABLET, FILM COATED ORAL at 12:05

## 2020-02-18 NOTE — PROGRESS NOTE ADULT - ASSESSMENT
59 f with    Abnormal hardware Cervical spine  - ID evaluation/ observe off antibiotics  - Neurosurgical follow. Await recommendations   - pain control  - ESR, CRP    Possible esophageal fistula  - GI follow.  - EGD noted.   - esophagogram with no perforation    AICD (automatic cardioverter/defibrillator) present   - EP check    Arthritis   - pain control    Atrial Fibrillation paroxismal Dx in 08/2009  rate control    Back pain   - pain control    CAD (Coronary Artery Disease)   - continue Rx  - cardiology evaluation     Hypercholesteremia   - continue Rx    PAD (peripheral artery disease) r leg 06  -stable    Lung opacities  - Pulmonary evaluation Dr. Gutierrez noted    d/w patient at length    DCP home.     Mega Zimmerman MD pager [phone]

## 2020-02-18 NOTE — PROGRESS NOTE ADULT - ASSESSMENT
59F with PMH/PSH including CAD s/p 4vCABG, MI, AICD, stents x 3 on Plavix and ASA, HLD, pAFib, chronic back pain, ACDF C5-6 (2002), T10-L5 posterior spinal fusion for lumbar disc disease with radiculopathy 6/19/14), hip osteoarthritis s/p total hip arthroplasty 7/13/15, s/p open posterior fusion of 2 or more  joints of posterior column of L vertebrae (11/5/15) admitted for fevers found to have retropharyngeal air at C5-6 associated with a fractured screw embedded in the prevertebral soft tissues  Patient's clinical status is not consistent with esophageal perforation and furthermore, esophagram negative for leak (gastrograffin followed by barium) therefore there is a low likelihood of perforation  2/16 VSS ambulating  seen and examined no acute distress EGD for Tuesday 2/17 VSS, afebrile, pt showered this AM, plan for EGD in AM with GI.   2/18 VSS. EGD likely today. No acute thoracic surgery intervention required at this time.

## 2020-02-18 NOTE — PROGRESS NOTE ADULT - SUBJECTIVE AND OBJECTIVE BOX
Cardiovascular Disease Progress Note    Overnight events: No acute events overnight.  for egd today. no new cardiac sx  Otherwise review of systems negative    Objective Findings:  T(C): 36.8 (02-18-20 @ 04:25), Max: 36.9 (02-17-20 @ 08:00)  HR: 63 (02-18-20 @ 04:25) (52 - 81)  BP: 112/60 (02-18-20 @ 04:25) (100/69 - 113/63)  RR: 18 (02-18-20 @ 04:25) (18 - 18)  SpO2: 97% (02-18-20 @ 04:25) (95% - 99%)  Wt(kg): --  Daily     Daily       Physical Exam:  Gen: NAD  HEENT: EOMI  CV: RRR, normal S1 + S2, no m/r/g  Lungs: CTAB  Abd: soft, non-tender  Ext: No edema      Laboratory Data:                        10.6   4.55  )-----------( 263      ( 18 Feb 2020 06:49 )             31.9                     Inpatient Medications:  MEDICATIONS  (STANDING):  aspirin 325 milliGRAM(s) Oral daily  atorvastatin 40 milliGRAM(s) Oral at bedtime  clopidogrel Tablet 75 milliGRAM(s) Oral daily  furosemide    Tablet 40 milliGRAM(s) Oral daily  heparin  Injectable 5000 Unit(s) SubCutaneous every 12 hours  losartan 50 milliGRAM(s) Oral two times a day  metoprolol succinate ER 50 milliGRAM(s) Oral two times a day  oxyCODONE  ER Tablet 10 milliGRAM(s) Oral every 12 hours  pantoprazole    Tablet 40 milliGRAM(s) Oral before breakfast  sodium chloride 0.9%. 1000 milliLiter(s) (75 mL/Hr) IV Continuous <Continuous>      Assessment:  -CAD s/p 4vCABG and PCI  -ICM s/p AICD   -HLD  -pAFib  -chronic back pain s/p T10-L5 posterior spinal fusion for lumbar disc disease with radiculopathy   -? C spine with gas, implying erosion into esophagus or gas forming bacteria around the screw    Recs:  -c/w metop and losartan for gdmt for lv dysfunction and anti-anginal/anti-arrhythmic effect. asx episodes of sinus hany, would c/w beta blockers for now. has ICD (backup pacing) -> can check device for parameters and to ensure functioning appropriately  -c/w lasix 40mg. appears euvolemic  -c/w dapt and statin for cad/stents  -hx of pAF not on ac, remains on high dose asa and plavix  -multispecialty services appreciated - neuro, gi and ent  -s/p neg esophagram  -s/p ct chest. pulm consult appreciated  -awaiting egd today. is medically optimized and can proceed without further cardiac workup  -care per medicine        Over 25 minutes spent on total encounter; more than 50% of the visit was spent counseling and/or coordinating care by the attending physician.      Ministerio Dupont MD   Cardiovascular Disease  [phone]

## 2020-02-18 NOTE — PROGRESS NOTE ADULT - SUBJECTIVE AND OBJECTIVE BOX
CC: F/U for ? eso perf    Saw/spoke to patient. No fevers, no chills. No new complaints. Unchanged.    Allergies  No Known Allergies    ANTIMICROBIALS:  Off    PE:    Vital Signs Last 24 Hrs  T(C): 36.8 (18 Feb 2020 08:00), Max: 36.9 (17 Feb 2020 14:38)  T(F): 98.2 (18 Feb 2020 08:00), Max: 98.5 (17 Feb 2020 14:38)  HR: 62 (18 Feb 2020 08:00) (52 - 77)  BP: 110/60 (18 Feb 2020 08:00) (102/58 - 113/63)  RR: 18 (18 Feb 2020 08:00) (18 - 18)  SpO2: 95% (18 Feb 2020 08:00) (95% - 99%)    Gen: AOx3, NAD, non-toxic  CV: S1+S2 normal, nontachycardic  Resp: Clear bilat, no resp distress, no crackles/wheezes  Abd: Soft, nontender, +BS  Ext: No LE edema, no wounds    LABS:                        10.6   4.55  )-----------( 263      ( 18 Feb 2020 06:49 )             31.9     MICROBIOLOGY:    .Blood Blood-Peripheral  02-13-20   No growth to date.    .Blood Blood-Peripheral  02-13-20   No growth to date.    .Urine Clean Catch (Midstream)  02-13-20   No growth    (otherwise reviewed)    RADIOLOGY:    2/14 XR:    IMPRESSION:  Unremarkable esophagram.    No extravasation of contrast to suggest esophageal perforation.

## 2020-02-18 NOTE — PROGRESS NOTE ADULT - ASSESSMENT
59 F PMHx of CAD, Afib with PPM, and multiple spinal surgeries with hardware who presented to the ED with fevers  Subjective fever, no leukocytosis  Patient very well appearing, does not appear toxic, no complaints aside from neck pain  CT spine with evidence of hardware loosening, also associated air concerning for possible R sided fistula and air/debris  Aside from elevated ESR, no signs systemic infection  Despite this, still significant concern for radiographic findings suspicious for infection vs hardware loosening  New CT with no significant change  Uncertain infection presently considering stability  Overall,  1) Abnormal Finding on Imaging  - Monitor off abx for now  - BCX NGTD  - Planned for EGD today  2) Hardware Loosening  - F/U neurosurgery  - If not able to tolerate MRI, possible role for tagged WBC  3) Fever  - Monitor for further fevers  - If signs sepsis or worsening, would start Vanco/Zosyn (low threshold)    Mars Davey MD  Pager [phone]  After 5pm and on weekends call [phone]

## 2020-02-18 NOTE — PROGRESS NOTE ADULT - SUBJECTIVE AND OBJECTIVE BOX
Patient is a 59y old  Female who presents with a chief complaint of abnormal CT C spine (18 Feb 2020 14:22)      SUBJECTIVE / OVERNIGHT EVENTS: No new complaints.   Review of Systems  chest pain no  palpitations no  sob no  nausea no  headache no    MEDICATIONS  (STANDING):  aspirin 325 milliGRAM(s) Oral daily  atorvastatin 40 milliGRAM(s) Oral at bedtime  clopidogrel Tablet 75 milliGRAM(s) Oral daily  furosemide    Tablet 40 milliGRAM(s) Oral daily  heparin  Injectable 5000 Unit(s) SubCutaneous every 12 hours  losartan 50 milliGRAM(s) Oral two times a day  metoprolol succinate ER 50 milliGRAM(s) Oral two times a day  oxyCODONE  ER Tablet 10 milliGRAM(s) Oral every 12 hours  pantoprazole    Tablet 40 milliGRAM(s) Oral before breakfast  sodium chloride 0.9%. 1000 milliLiter(s) (75 mL/Hr) IV Continuous <Continuous>    MEDICATIONS  (PRN):  diazepam    Tablet 10 milliGRAM(s) Oral four times a day PRN anxiety  oxyCODONE    IR 30 milliGRAM(s) Oral every 4 hours PRN Severe Pain (7 - 10)      Vital Signs Last 24 Hrs  T(C): 36.6 (18 Feb 2020 16:08), Max: 36.8 (18 Feb 2020 04:25)  T(F): 97.9 (18 Feb 2020 16:08), Max: 98.3 (18 Feb 2020 04:25)  HR: 68 (18 Feb 2020 17:17) (52 - 78)  BP: 129/75 (18 Feb 2020 17:17) (103/63 - 136/71)  BP(mean): --  RR: 18 (18 Feb 2020 16:08) (18 - 18)  SpO2: 97% (18 Feb 2020 16:08) (95% - 97%)    PHYSICAL EXAM:  GENERAL: NAD, well-developed  HEAD:  Atraumatic, Normocephalic  EYES: EOMI, PERRLA, conjunctiva and sclera clear  NECK: Supple, No JVD  CHEST/LUNG: Clear to auscultation bilaterally; No wheeze  HEART: Regular rate and rhythm; No murmurs, rubs, or gallops  ABDOMEN: Soft, Nontender, Nondistended; Bowel sounds present  EXTREMITIES:  2+ Peripheral Pulses, No clubbing, cyanosis, or edema  PSYCH: AAOx3  NEUROLOGY: non-focal  SKIN: No rashes or lesions    LABS:                        10.6   4.55  )-----------( 263      ( 18 Feb 2020 06:49 )             31.9             < from: Upper Endoscopy (02.18.20 @ 14:34) >                                                                Findings:       The examined esophagus was normal. No evidence of esophageal tear, foreign body or other        abnormality.       The entire examined stomach was normal.       The examined duodenum was normal.                                                                                                        Impression:          - Normal esophagus, with no evidence of esophageal tear, foreign body or                        other abnormality.  Recommendation:      - Return patient to [facility] for [facility] care.    < end of copied text >            RADIOLOGY & ADDITIONAL TESTS:    Imaging Personally Reviewed:    Consultant(s) Notes Reviewed:      Care Discussed with Consultants/Other Providers:

## 2020-02-18 NOTE — PROGRESS NOTE ADULT - SUBJECTIVE AND OBJECTIVE BOX
VITAL SIGNS    Telemetry:    Vital Signs Last 24 Hrs  T(C): 36.8 (02-18-20 @ 08:00), Max: 36.9 (02-17-20 @ 14:38)  T(F): 98.2 (02-18-20 @ 08:00), Max: 98.5 (02-17-20 @ 14:38)  HR: 62 (02-18-20 @ 08:00) (52 - 77)  BP: 110/60 (02-18-20 @ 08:00) (102/58 - 113/63)  RR: 18 (02-18-20 @ 08:00) (18 - 18)  SpO2: 95% (02-18-20 @ 08:00) (95% - 99%)            02-17 @ 07:01  -  02-18 @ 07:00  --------------------------------------------------------  IN: 1080 mL / OUT: 1900 mL / NET: -820 mL       Daily     Daily   Admit Wt: Drug Dosing Weight  Height (cm): 152.4 (13 Feb 2020 12:57)  Weight (kg): 61.2 (13 Feb 2020 12:57)  BMI (kg/m2): 26.4 (13 Feb 2020 12:57)  BSA (m2): 1.58 (13 Feb 2020 12:57)      CAPILLARY BLOOD GLUCOSE              MEDICATIONS  aspirin 325 milliGRAM(s) Oral daily  atorvastatin 40 milliGRAM(s) Oral at bedtime  clopidogrel Tablet 75 milliGRAM(s) Oral daily  diazepam    Tablet 10 milliGRAM(s) Oral four times a day PRN  furosemide    Tablet 40 milliGRAM(s) Oral daily  heparin  Injectable 5000 Unit(s) SubCutaneous every 12 hours  losartan 50 milliGRAM(s) Oral two times a day  metoprolol succinate ER 50 milliGRAM(s) Oral two times a day  oxyCODONE    IR 30 milliGRAM(s) Oral every 4 hours PRN  oxyCODONE  ER Tablet 10 milliGRAM(s) Oral every 12 hours  pantoprazole    Tablet 40 milliGRAM(s) Oral before breakfast  sodium chloride 0.9%. 1000 milliLiter(s) IV Continuous <Continuous>      Interval History: Pt denies any throat pain or issues swallowing. Tolerating liquid diet. NPO for EGD today    PHYSICAL EXAM  General: WN, WD, NAD  Neurology: alert and oriented x 3, nonfocal, no gross deficits  CV : s1, s2  Lungs: CTA B/L  Abdomen: soft, NT,ND, (+ )Bowel sounds  :  voiding  Extremities: -c/c/e      LABS                                     10.6   4.55  )-----------( 263      ( 18 Feb 2020 06:49 )             31.9                 PAST MEDICAL & SURGICAL HISTORY:  AICD (automatic cardioverter/defibrillator) present  Pseudoarthrosis of lumbar spine  Stented coronary artery: x 3 ESTEVAN  Degenerative disc disease, lumbar  PAD (peripheral artery disease): r leg 06  Hypercholesteremia  Reflux  Back pain  MI (myocardial infarction)  Arthritis  CAD (Coronary Artery Disease): CARDIAC CATH PTCA/[place] x 3 to SVG to RCA 08/2009  Atrial Fibrillation: paroxismal Dx in 08/2009  Elective surgery: Left leg &quot;clean out&quot; secondary PAD  H/O cervical spine surgery  History of back surgery: T10 - L4 posterior fusion  Elective surgery: Right leg art bypass 2006  S/P lumbar spine operation: laminectomy  Elective surgery: AICD 2006  S/P coronary artery stent placement: x 3  - 2010  S/P coronary artery bypass graft x 4: 2006

## 2020-02-18 NOTE — PROGRESS NOTE ADULT - SUBJECTIVE AND OBJECTIVE BOX
Pre-Endoscopy Evaluation      Referring Physician:  dr. martinez                                  Procedure:  upper gastrointestinal endoscopy     Indication for Procedure: dysphagia, abnormal CT    Pertinent History: 59y old female with CAD s/p 4vCABG, MI, AICD, stents x 3 (on Plavix and ASA), HLD, pAFib, chronic back pain, T10-L5 posterior spinal fusion for lumbar disc disease with radiculopathy 6/19/14), hip osteoarthritis s/p total hip arthroplasty 7/13/15, s/p open posterior fusion of 2 or more  joints of posterior column of L vertebrae (11/5/15) with abnormal CT scan with pocket of air/debris adjacent to the right side of the fixation plate and the retropharyngeal soft tissues, which may represent a fistulous connection anteriorly to the esophagus or airway though no definitive connection is visualized      PAST MEDICAL & SURGICAL HISTORY:  Pseudoarthrosis of lumbar spine  Stented coronary artery: x 3 ESTEVAN  Degenerative disc disease, lumbar  PAD (peripheral artery disease): r leg 06  Hypercholesteremia  Reflux  Back pain  MI (myocardial infarction)  Arthritis  CAD (Coronary Artery Disease): CARDIAC CATH PTCA/[place] x 3 to SVG to RCA 08/2009  Atrial Fibrillation: paroxismal Dx in 08/2009  Elective surgery: Left leg &quot;clean out&quot; secondary PAD  H/O cervical spine surgery  History of back surgery: T10 - L4 posterior fusion  Elective surgery: Right leg art bypass 2006  S/P lumbar spine operation: laminectomy  Elective surgery: AICD 2006  S/P coronary artery stent placement: x 3  - 2010  S/P coronary artery bypass graft x 4: 2006      PMH of Gastroparesis [ ]  Gastric Surgery [ ]  Gastric Outlet Obstruction [ ]    Allergies    No Known Allergies    Intolerances    Latex allergy: [ ] yes [x] no    Medications:MEDICATIONS  (STANDING):  aspirin 325 milliGRAM(s) Oral daily  atorvastatin 40 milliGRAM(s) Oral at bedtime  clopidogrel Tablet 75 milliGRAM(s) Oral daily  furosemide    Tablet 40 milliGRAM(s) Oral daily  heparin  Injectable 5000 Unit(s) SubCutaneous every 12 hours  losartan 50 milliGRAM(s) Oral two times a day  metoprolol succinate ER 50 milliGRAM(s) Oral two times a day  oxyCODONE  ER Tablet 10 milliGRAM(s) Oral every 12 hours  pantoprazole    Tablet 40 milliGRAM(s) Oral before breakfast  sodium chloride 0.9%. 1000 milliLiter(s) (75 mL/Hr) IV Continuous <Continuous>    MEDICATIONS  (PRN):  diazepam    Tablet 10 milliGRAM(s) Oral four times a day PRN anxiety  oxyCODONE    IR 30 milliGRAM(s) Oral every 4 hours PRN Severe Pain (7 - 10)      Smoking: [ ] yes  [X] no    AICD/PPM: [x] yes   [ ] no    Pertinent lab data:                        10.6   4.55  )-----------( 263      ( 18 Feb 2020 06:49 )             31.9           Physical Examination:    Daily   Vital Signs Last 24 Hrs  T(C): 36.8 (18 Feb 2020 08:00), Max: 36.9 (17 Feb 2020 14:38)  T(F): 98.2 (18 Feb 2020 08:00), Max: 98.5 (17 Feb 2020 14:38)  HR: 62 (18 Feb 2020 08:00) (52 - 77)  BP: 110/60 (18 Feb 2020 08:00) (102/58 - 113/63)  BP(mean): --  RR: 18 (18 Feb 2020 08:00) (18 - 18)  SpO2: 95% (18 Feb 2020 08:00) (95% - 99%)    Drug Dosing Weight  Height (cm): 152.4 (13 Feb 2020 12:57)  Weight (kg): 61.2 (13 Feb 2020 12:57)  BMI (kg/m2): 26.4 (13 Feb 2020 12:57)  BSA (m2): 1.58 (13 Feb 2020 12:57)    Constitutional: NAD      Neck:  No JVD    Respiratory: CTAB/L    Cardiovascular: S1 and S2    Gastrointestinal: BS+, soft, NT/ND    Extremities: No peripheral edema    Neurological: A/O x 3    : No Mejía    Skin: No rashes    Comments:    The patient is a suitable candidate for the planned procedure unless box checked [ ]  No, explain:

## 2020-02-19 ENCOUNTER — TRANSCRIPTION ENCOUNTER (OUTPATIENT)
Age: 60
End: 2020-02-19

## 2020-02-19 DIAGNOSIS — I48.0 PAROXYSMAL ATRIAL FIBRILLATION: ICD-10-CM

## 2020-02-19 DIAGNOSIS — I25.10 ATHEROSCLEROTIC HEART DISEASE OF NATIVE CORONARY ARTERY WITHOUT ANGINA PECTORIS: ICD-10-CM

## 2020-02-19 DIAGNOSIS — I73.9 PERIPHERAL VASCULAR DISEASE, UNSPECIFIED: ICD-10-CM

## 2020-02-19 LAB
ANION GAP SERPL CALC-SCNC: 13 MMOL/L — SIGNIFICANT CHANGE UP (ref 5–17)
BUN SERPL-MCNC: 12 MG/DL — SIGNIFICANT CHANGE UP (ref 7–23)
CALCIUM SERPL-MCNC: 9.7 MG/DL — SIGNIFICANT CHANGE UP (ref 8.4–10.5)
CHLORIDE SERPL-SCNC: 101 MMOL/L — SIGNIFICANT CHANGE UP (ref 96–108)
CO2 SERPL-SCNC: 29 MMOL/L — SIGNIFICANT CHANGE UP (ref 22–31)
CREAT SERPL-MCNC: 1.05 MG/DL — SIGNIFICANT CHANGE UP (ref 0.5–1.3)
GLUCOSE SERPL-MCNC: 105 MG/DL — HIGH (ref 70–99)
HCT VFR BLD CALC: 31.9 % — LOW (ref 34.5–45)
HGB BLD-MCNC: 10.6 G/DL — LOW (ref 11.5–15.5)
MCHC RBC-ENTMCNC: 33.2 GM/DL — SIGNIFICANT CHANGE UP (ref 32–36)
MCHC RBC-ENTMCNC: 34.6 PG — HIGH (ref 27–34)
MCV RBC AUTO: 104.2 FL — HIGH (ref 80–100)
NRBC # BLD: 0 /100 WBCS — SIGNIFICANT CHANGE UP (ref 0–0)
PLATELET # BLD AUTO: 267 K/UL — SIGNIFICANT CHANGE UP (ref 150–400)
POTASSIUM SERPL-MCNC: 3.9 MMOL/L — SIGNIFICANT CHANGE UP (ref 3.5–5.3)
POTASSIUM SERPL-SCNC: 3.9 MMOL/L — SIGNIFICANT CHANGE UP (ref 3.5–5.3)
RBC # BLD: 3.06 M/UL — LOW (ref 3.8–5.2)
RBC # FLD: 13.4 % — SIGNIFICANT CHANGE UP (ref 10.3–14.5)
SODIUM SERPL-SCNC: 143 MMOL/L — SIGNIFICANT CHANGE UP (ref 135–145)
WBC # BLD: 5 K/UL — SIGNIFICANT CHANGE UP (ref 3.8–10.5)
WBC # FLD AUTO: 5 K/UL — SIGNIFICANT CHANGE UP (ref 3.8–10.5)

## 2020-02-19 PROCEDURE — 99221 1ST HOSP IP/OBS SF/LOW 40: CPT

## 2020-02-19 PROCEDURE — 99232 SBSQ HOSP IP/OBS MODERATE 35: CPT

## 2020-02-19 RX ORDER — OXYCODONE HYDROCHLORIDE 5 MG/1
1 TABLET ORAL
Qty: 0 | Refills: 0 | DISCHARGE

## 2020-02-19 RX ORDER — CALCIUM CARBONATE 500(1250)
1 TABLET ORAL THREE TIMES A DAY
Refills: 0 | Status: DISCONTINUED | OUTPATIENT
Start: 2020-02-19 | End: 2020-02-25

## 2020-02-19 RX ORDER — OXYCODONE HYDROCHLORIDE 5 MG/1
1 TABLET ORAL
Qty: 0 | Refills: 0 | DISCHARGE
Start: 2020-02-19

## 2020-02-19 RX ADMIN — OXYCODONE HYDROCHLORIDE 30 MILLIGRAM(S): 5 TABLET ORAL at 20:12

## 2020-02-19 RX ADMIN — Medication 10 MILLIGRAM(S): at 07:15

## 2020-02-19 RX ADMIN — OXYCODONE HYDROCHLORIDE 30 MILLIGRAM(S): 5 TABLET ORAL at 09:49

## 2020-02-19 RX ADMIN — OXYCODONE HYDROCHLORIDE 30 MILLIGRAM(S): 5 TABLET ORAL at 10:21

## 2020-02-19 RX ADMIN — Medication 325 MILLIGRAM(S): at 11:37

## 2020-02-19 RX ADMIN — OXYCODONE HYDROCHLORIDE 10 MILLIGRAM(S): 5 TABLET ORAL at 06:00

## 2020-02-19 RX ADMIN — PANTOPRAZOLE SODIUM 40 MILLIGRAM(S): 20 TABLET, DELAYED RELEASE ORAL at 06:00

## 2020-02-19 RX ADMIN — OXYCODONE HYDROCHLORIDE 30 MILLIGRAM(S): 5 TABLET ORAL at 13:56

## 2020-02-19 RX ADMIN — HEPARIN SODIUM 5000 UNIT(S): 5000 INJECTION INTRAVENOUS; SUBCUTANEOUS at 17:37

## 2020-02-19 RX ADMIN — OXYCODONE HYDROCHLORIDE 10 MILLIGRAM(S): 5 TABLET ORAL at 20:19

## 2020-02-19 RX ADMIN — ATORVASTATIN CALCIUM 40 MILLIGRAM(S): 80 TABLET, FILM COATED ORAL at 22:16

## 2020-02-19 RX ADMIN — OXYCODONE HYDROCHLORIDE 30 MILLIGRAM(S): 5 TABLET ORAL at 06:01

## 2020-02-19 RX ADMIN — OXYCODONE HYDROCHLORIDE 30 MILLIGRAM(S): 5 TABLET ORAL at 14:31

## 2020-02-19 RX ADMIN — HEPARIN SODIUM 5000 UNIT(S): 5000 INJECTION INTRAVENOUS; SUBCUTANEOUS at 06:01

## 2020-02-19 RX ADMIN — CLOPIDOGREL BISULFATE 75 MILLIGRAM(S): 75 TABLET, FILM COATED ORAL at 11:37

## 2020-02-19 RX ADMIN — Medication 40 MILLIGRAM(S): at 06:01

## 2020-02-19 NOTE — DIETITIAN INITIAL EVALUATION ADULT. - ADD RECOMMEND
Although pt has been eating well for the most part, pt would like to have Ensure Enlive x 1 daily in case she is not eating well at a meal and she can take the shake-made PA aware. Although pt has been eating well for the most part, pt would like to have Ensure Enlive x 1 daily in case she is not eating well at a meal and she can take the shake-made PA aware. Discussed protein rich foods available on menu. Discussed consuming protein first at meal times and then eating the other foods.

## 2020-02-19 NOTE — PROGRESS NOTE ADULT - SUBJECTIVE AND OBJECTIVE BOX
CC: F/U for hardware loosening    Saw/spoke to patient. No fevers, no chills. No new complaints. Unchanged.    Allergies  No Known Allergies    ANTIMICROBIALS:  Off    PE:    Vital Signs Last 24 Hrs  T(C): 37.1 (19 Feb 2020 10:43), Max: 37.1 (19 Feb 2020 10:43)  T(F): 98.8 (19 Feb 2020 10:43), Max: 98.8 (19 Feb 2020 10:43)  HR: 66 (19 Feb 2020 10:43) (63 - 78)  BP: 101/46 (19 Feb 2020 10:43) (101/46 - 136/71)  RR: 18 (19 Feb 2020 10:43) (18 - 18)  SpO2: 95% (19 Feb 2020 10:43) (95% - 97%)    Gen: AOx3, NAD, non-toxic, pleasant  CV: S1+S2 normal, nontachycardic  Resp: Clear bilat, no resp distress, no crackles/wheezes  Abd: Soft, nontender, +BS  Ext: No LE edema, no wounds    LABS:                        10.6   5.00  )-----------( 267      ( 19 Feb 2020 06:42 )             31.9     02-19    143  |  101  |  12  ----------------------------<  105<H>  3.9   |  29  |  1.05    Ca    9.7      19 Feb 2020 06:42    MICROBIOLOGY:    .Blood Blood-Peripheral  02-13-20   No growth at 5 days    .Blood Blood-Peripheral  02-13-20   No growth at 5 days.    .Urine Clean Catch (Midstream)  02-13-20   No growth    (otherwise reviewed)    RADIOLOGY:    2/18 EGD:    Findings:       The examined esophagus was normal. No evidence of esophageal tear, foreign body or other        abnormality.       The entire examined stomach was normal.       The examined duodenum was normal.                                                                                                        Impression:          - Normal esophagus, with no evidence of esophageal tear, foreign body or                        other abnormality.  Recommendation:      - Return patient to [facility] for ongoing care.    2/14 eso:    IMPRESSION:  Unremarkable esophagram.    No extravasation of contrast to suggest esophageal perforation.

## 2020-02-19 NOTE — CONSULT NOTE ADULT - ASSESSMENT
59F with PMHx incl. CAD s/p 4vCABG, MI, AICD, stents x 3, HLD, pAFib, chronic back pain, T10-L5 posterior spinal fusion, total hip arthroplasty, s/p open posterior fusion of 2 or more  joints of posterior column of L vertebrae.  Admitted for "C spine with gas, implying erosion into esophagus or gas forming bacteria around the screw" per PCP  No esophageal injury per GI    Pt will likely be discharged today or tomorrow  Discontinue the Oxycodone Extended Release  Continue patient's home dose of Oxycodone 30 mg every 6 hours PRN pain  Pt provided with list of out-patient pain management practices for follow up after discharge    Signing off, reconsult as needed      Time spent on encounter:   25     Minutes    [facility]  [phone]

## 2020-02-19 NOTE — DIETITIAN INITIAL EVALUATION ADULT. - OTHER INFO
Pt reports she is a "small eater" and consumes one meal daily. Reports she was taking Ensure shakes at home since she was trying to keep her wt up.     Pt reports her wt was as low as 110 pounds in the summer time and she has been able to increase her wt back up to 130-140 pounds (reports stable for a few weeks now), noted dosing wt of 134.6 pounds. Pt reports Ensure shakes helped to make sure she gained the wt back. Pt reports in house since being on solid foods she has been able to finish most of her meals, reports at times she does not and would like to have Ensure Enlive ordered once daily in case she does not complete a meal.     Pt reports NKFA. States no micronutrient coverage.     Pt asking about surgery repeatedly and states she still feels she is not swallowing properly, per conversation, it seemed pt did not fully understand how upper endoscopy was conducted and the fact that it was WNL.

## 2020-02-19 NOTE — PROGRESS NOTE ADULT - SUBJECTIVE AND OBJECTIVE BOX
Patient is a 59y old  Female who presents with a chief complaint of abnormal CT C spine (19 Feb 2020 08:25)      HPI:  59F with PMH/PSH including CAD s/p 4vCABG, MI, AICD, stents x 3 on Plavix and ASA, HLD, pAFib, chronic back pain, T10-L5 posterior spinal fusion for lumbar disc disease with radiculopathy 6/19/14), hip osteoarthritis s/p total hip arthroplasty 7/13/15, s/p open posterior fusion of 2 or more  joints of posterior column of L vertebrae (11/5/15) presents to the ED sent in by Dr. Dupont with a note reading "C spine with gas, implying erosion into esophagus or gas forming bacteria around the screw."  Patient reports that she has been having fevers for two months, intermittently lasting about 8 hours each time and spontaneously resolving.  Reports has been hoarse for many years, reports was sent to ENT, scoped and sent for CT C spine showing that stated result.  Pt had this CT 2 weeks ago but did not have fever so did not feel she had to come in.  Does report neck pain but no numbness weakness to UE's.  No other focal deficit.  Fevers get better and wrose on their own. (13 Feb 2020 19:49)    Interval Events: Initial neck CT suggestive of air in neck surrounding displaced spinal hardware.  Esophagram was completed and shows no leak of contrast into neck.    REVIEW OF SYSTEMS:    CONSTITUTIONAL: No weakness, fevers or chills  EYES/ENT: No visual changes;  No vertigo or throat pain   NECK: No pain or stiffness  RESPIRATORY: No cough, wheezing, hemoptysis; No shortness of breath  CARDIOVASCULAR: No chest pain or palpitations  GASTROINTESTINAL: No abdominal or epigastric pain. No nausea, vomiting, or hematemesis; No diarrhea or constipation. No melena or hematochezia.  GENITOURINARY: No dysuria, frequency or hematuria  NEUROLOGICAL: No numbness or weakness  SKIN: No itching, burning, rashes, or lesions   All other review of systems is negative unless indicated above.    MEDICATIONS  (STANDING):  aspirin 325 milliGRAM(s) Oral daily  atorvastatin 40 milliGRAM(s) Oral at bedtime  clopidogrel Tablet 75 milliGRAM(s) Oral daily  furosemide    Tablet 40 milliGRAM(s) Oral daily  heparin  Injectable 5000 Unit(s) SubCutaneous every 12 hours  losartan 50 milliGRAM(s) Oral two times a day  metoprolol succinate ER 50 milliGRAM(s) Oral two times a day  oxyCODONE  ER Tablet 10 milliGRAM(s) Oral every 12 hours  pantoprazole    Tablet 40 milliGRAM(s) Oral before breakfast  sodium chloride 0.9%. 1000 milliLiter(s) (75 mL/Hr) IV Continuous <Continuous>    MEDICATIONS  (PRN):  diazepam    Tablet 10 milliGRAM(s) Oral four times a day PRN anxiety  oxyCODONE    IR 30 milliGRAM(s) Oral every 4 hours PRN Severe Pain (7 - 10)                            10.6   5.00  )-----------( 267      ( 19 Feb 2020 06:42 )             31.9     02-19    143  |  101  |  12  ----------------------------<  105<H>  3.9   |  29  |  1.05    Ca    9.7      19 Feb 2020 06:42      I&O's Detail    19 Feb 2020 07:01  -  19 Feb 2020 16:04  --------------------------------------------------------  IN:    Oral Fluid: 360 mL  Total IN: 360 mL    OUT:  Total OUT: 0 mL    Total NET: 360 mL        Vital Signs Last 24 Hrs  T(C): 36.8 (19 Feb 2020 14:36), Max: 37.1 (19 Feb 2020 10:43)  T(F): 98.3 (19 Feb 2020 14:36), Max: 98.8 (19 Feb 2020 10:43)  HR: 68 (19 Feb 2020 14:36) (63 - 78)  BP: 95/59 (19 Feb 2020 14:36) (95/59 - 136/71)  BP(mean): --  RR: 18 (19 Feb 2020 14:36) (18 - 18)  SpO2: 93% (19 Feb 2020 14:36) (93% - 97%)    PHYSICAL EXAM:  Constitutional: A and O x3 without complaints    Psychiatric: age appropriate behavior, cooperative    Skin: no obvious skin lesions    Lymphatic: no cervical lymphadenopathy    Ears: WNL externally		    External Nose:  Normal, no structural deformities  		  Anterior Nasal Cavity:	Normal mucosa, no turbinate hypertrophy, straight septum  					  Oral Cavity:  Good dentition, tongue midline, no lesions or ulcerations    Tonsilar Size: 2+ bilaterally    Neck: No palpable lymphadenopathy    Pulmonary: No Acute Distress.     Neurologic: awake and alert      Esophagram: Images reviewed.  No leak and no aspiration.

## 2020-02-19 NOTE — DISCHARGE NOTE PROVIDER - CARE PROVIDER_API CALL
Gomez Dupont (MD)  Cardiovascular Disease; Internal Medicine  [address] [place] [address]  Phone: [phone]  Fax: [phone]  Follow Up Time: Gomez Dupont)  Cardiovascular Disease; Internal Medicine  [address] [address] [place] [address]  Phone: [phone]  Fax: [phone]  Follow Up Time:     Debra Hernandez)  [facility] Neurosurgery  General  [address] [place] [address], [facility] [address]  [place] [address]  Phone: [phone]  Fax: [phone]  Follow Up Time: Gomez Dupont)  Cardiovascular Disease; Internal Medicine  [address] [address] [place] [address]  Phone: [phone]  Fax: [phone]  Follow Up Time:     Debra Hernandez)  [facility] Neurosurgery  General  [address] [facility], [address]  [place] [address]  Phone: [phone]  Fax: [phone]  Follow Up Time:     Sara Steward)  Otolaryngology  [address] [facility], [facility] [address]  [place] [address]  Phone: [phone]  Fax: [phone]  Follow Up Time:     Heidy Gutierrez)  Critical Care Medicine; Internal Medicine; Pulmonary Disease  [address] [facility], [address]  [place] [address]  Phone: [phone]  Fax: [phone]  Follow Up Time:     Boris Schreiber)  Surgery; Thoracic Surgery  [address] [place] [address], [place] [address]  Phone: [phone]  Fax: [phone]  Follow Up Time: Gomez Dupont)  Cardiovascular Disease; Internal Medicine  [address] [address] [place] [address]  Phone: [phone]  Fax: [phone]  Follow Up Time:     Debra Hernandez)  [facility] Neurosurgery  General  [address] [facility], [address]  [place] [address]  Phone: [phone]  Fax: [phone]  Follow Up Time:     Sara Steward)  Otolaryngology  [address] [facility]  [place] [address]  Phone: [phone]  Fax: [phone]  Follow Up Time:     Heidy Gutierrez)  Critical Care Medicine; Internal Medicine; Pulmonary Disease  [address] [facility], [address]  [place] [address]  Phone: [phone]  Fax: [phone]  Follow Up Time:     Boris Schreiber)  Surgery; Thoracic Surgery  [address] [place] [address], [place] [address]  Phone: [phone]  Fax: [phone]  Follow Up Time:     Mars Davey)  Infectious Disease; Internal Medicine  [address] [place] [address]  Phone: [phone]  Fax: [phone]  Follow Up Time:

## 2020-02-19 NOTE — CONSULT NOTE ADULT - REASON FOR ADMISSION
abnormal CT C spine

## 2020-02-19 NOTE — PROGRESS NOTE ADULT - ASSESSMENT
59 f with    Abnormal hardware Cervical spine  - ID evaluation/ observe off antibiotics  - Neurosurgical follow. No  intervention.   - pain control  - ENT follow  - CT neck repeat pending     Possible esophageal fistula  - GI follow.  - EGD noted.   - esophagogram with no perforation    AICD (automatic cardioverter/defibrillator) present   - EP check    Arthritis   - pain control    Atrial Fibrillation paroxismal Dx in 08/2009  rate control    Back pain   - pain control    CAD (Coronary Artery Disease)   - continue Rx  - cardiology evaluation     Hypercholesteremia   - continue Rx    PAD (peripheral artery disease) r leg 06  -stable    Lung opacities  - Pulmonary evaluation Dr. Gutierrez noted    d/w patient at length    DCP home.     Mega Zimmerman MD pager [phone]

## 2020-02-19 NOTE — DIETITIAN INITIAL EVALUATION ADULT. - PHYSICAL APPEARANCE
well nourished/other (specify) nutrition focused physical exam deferred at this time as pt became emotional at end of interview about being hospitalized and feeling as if her issues are not resolved-RN aware; visually no muscle/fat loss noted, pt c dark circles around eyes which seem to have been long standing.    Skin: no pressure injuries   Edema: none at this time

## 2020-02-19 NOTE — DIETITIAN INITIAL EVALUATION ADULT. - REASON INDICATOR FOR ASSESSMENT
Pt seen for LOS.   Source: pt and RN    Pt admitted c abnormal hardware-cervical spine, concern for C-spine c gas and erosion into esophagus, noted pt S/P esophagram and upper endoscopy-WNL and no perforation or leak. Noted per Neurosurgery resident note earlier today, no plan for neurosurgical intervention at this time.

## 2020-02-19 NOTE — CONSULT NOTE ADULT - PROVIDER SPECIALTY LIST ADULT
Cardiology
ENT
Gastroenterology
Infectious Disease
Neurosurgery
Pulmonology
Pain Medicine
Thoracic Surgery

## 2020-02-19 NOTE — CHART NOTE - NSCHARTNOTEFT_GEN_A_CORE
No acute neurosurgical intervention, no neurosurgical contraindication to D/C and f/u outpatient with Dr. Hernandez

## 2020-02-19 NOTE — DISCHARGE NOTE PROVIDER - CARE PROVIDERS DIRECT ADDRESSES
,DirectAddress_Unknown ,DirectAddress_Unknown,[email or web address] ,[email or web address],[email or web address] ,DirectAddress_Unknown,[email or web address]

## 2020-02-19 NOTE — PROGRESS NOTE ADULT - SUBJECTIVE AND OBJECTIVE BOX
Patient is a 59y old  Female who presents with a chief complaint of abnormal CT C spine (19 Feb 2020 18:04)      SUBJECTIVE / OVERNIGHT EVENTS: No new complaints.   Review of Systems  chest pain no  palpitations no  sob no  nausea no  headache no    MEDICATIONS  (STANDING):  aspirin 325 milliGRAM(s) Oral daily  atorvastatin 40 milliGRAM(s) Oral at bedtime  clopidogrel Tablet 75 milliGRAM(s) Oral daily  furosemide    Tablet 40 milliGRAM(s) Oral daily  heparin  Injectable 5000 Unit(s) SubCutaneous every 12 hours  losartan 50 milliGRAM(s) Oral two times a day  metoprolol succinate ER 50 milliGRAM(s) Oral two times a day  oxyCODONE  ER Tablet 10 milliGRAM(s) Oral every 12 hours  pantoprazole    Tablet 40 milliGRAM(s) Oral before breakfast  sodium chloride 0.9%. 1000 milliLiter(s) (75 mL/Hr) IV Continuous <Continuous>    MEDICATIONS  (PRN):  calcium carbonate    500 mG (Tums) Chewable 1 Tablet(s) Chew three times a day PRN Indigestion  diazepam    Tablet 10 milliGRAM(s) Oral four times a day PRN anxiety  oxyCODONE    IR 30 milliGRAM(s) Oral every 4 hours PRN Severe Pain (7 - 10)      Vital Signs Last 24 Hrs  T(C): 36.9 (19 Feb 2020 16:40), Max: 37.1 (19 Feb 2020 10:43)  T(F): 98.4 (19 Feb 2020 16:40), Max: 98.8 (19 Feb 2020 10:43)  HR: 68 (19 Feb 2020 20:11) (63 - 68)  BP: 106/68 (19 Feb 2020 20:11) (95/59 - 109/65)  BP(mean): --  RR: 18 (19 Feb 2020 16:40) (18 - 18)  SpO2: 98% (19 Feb 2020 16:40) (93% - 98%)    PHYSICAL EXAM:  GENERAL: NAD, well-developed  HEAD:  Atraumatic, Normocephalic  EYES: EOMI, PERRLA, conjunctiva and sclera clear  NECK: Supple, No JVD  CHEST/LUNG: Clear to auscultation bilaterally; No wheeze  HEART: Regular rate and rhythm; No murmurs, rubs, or gallops  ABDOMEN: Soft, Nontender, Nondistended; Bowel sounds present  EXTREMITIES:  2+ Peripheral Pulses, No clubbing, cyanosis, or edema  PSYCH: AAOx3  NEUROLOGY: non-focal  SKIN: No rashes or lesions    LABS:                        10.6   5.00  )-----------( 267      ( 19 Feb 2020 06:42 )             31.9     02-19    143  |  101  |  12  ----------------------------<  105<H>  3.9   |  29  |  1.05    Ca    9.7      19 Feb 2020 06:42                  RADIOLOGY & ADDITIONAL TESTS:    Imaging Personally Reviewed:    Consultant(s) Notes Reviewed:      Care Discussed with Consultants/Other Providers:

## 2020-02-19 NOTE — DIETITIAN INITIAL EVALUATION ADULT. - FACTORS AFF FOOD INTAKE
pt reports in house appetite has been good to fair; states no chewing/swallowing issues at this time, reports in the past she has had trouble swallowing small pieces of food but has been able to spit it back out

## 2020-02-19 NOTE — DISCHARGE NOTE PROVIDER - NSDCCAREPROVSEEN_GEN_ALL_CORE_FT
Mega Zimmerman  [facility] Gastroenterology, Team  [facility] Advanced Endoscopy, Team  Mars Davey  [facility] Medicine, Advance PracticeTeam  Gomez Dupont

## 2020-02-19 NOTE — CONSULT NOTE ADULT - CONSULT REQUESTED DATE/TIME
14-Feb-2020 07:42
13-Feb-2020 16:51
13-Feb-2020 17:05
14-Feb-2020
14-Feb-2020 07:56
15-Feb-2020 11:43
15-Feb-2020 12:00
19-Feb-2020 16:03

## 2020-02-19 NOTE — PROGRESS NOTE ADULT - SUBJECTIVE AND OBJECTIVE BOX
Cardiovascular Disease Progress Note    Overnight events: No acute events overnight.  s/p egd neg for perf. no new cardiac sx  Otherwise review of systems negative    Objective Findings:  T(C): 36.9 (02-19-20 @ 04:27), Max: 36.9 (02-19-20 @ 04:27)  HR: 63 (02-19-20 @ 04:27) (62 - 78)  BP: 109/65 (02-19-20 @ 04:27) (109/65 - 136/71)  RR: 18 (02-19-20 @ 04:27) (18 - 18)  SpO2: 95% (02-19-20 @ 04:27) (95% - 97%)  Wt(kg): --  Daily     Daily       Physical Exam:  Gen: NAD  HEENT: EOMI  CV: RRR, normal S1 + S2, no m/r/g  Lungs: CTAB  Abd: soft, non-tender  Ext: No edema      Laboratory Data:                        10.6   5.00  )-----------( 267      ( 19 Feb 2020 06:42 )             31.9     02-19    143  |  101  |  12  ----------------------------<  105<H>  3.9   |  29  |  1.05    Ca    9.7      19 Feb 2020 06:42                Inpatient Medications:  MEDICATIONS  (STANDING):  aspirin 325 milliGRAM(s) Oral daily  atorvastatin 40 milliGRAM(s) Oral at bedtime  clopidogrel Tablet 75 milliGRAM(s) Oral daily  furosemide    Tablet 40 milliGRAM(s) Oral daily  heparin  Injectable 5000 Unit(s) SubCutaneous every 12 hours  losartan 50 milliGRAM(s) Oral two times a day  metoprolol succinate ER 50 milliGRAM(s) Oral two times a day  oxyCODONE  ER Tablet 10 milliGRAM(s) Oral every 12 hours  pantoprazole    Tablet 40 milliGRAM(s) Oral before breakfast  sodium chloride 0.9%. 1000 milliLiter(s) (75 mL/Hr) IV Continuous <Continuous>      Assessment:  -CAD s/p 4vCABG and PCI  -ICM s/p AICD   -HLD  -pAFib  -chronic back pain s/p T10-L5 posterior spinal fusion for lumbar disc disease with radiculopathy   -? C spine with gas, implying erosion into esophagus or gas forming bacteria around the screw    Recs:  -c/w metop and losartan for gdmt for lv dysfunction and anti-anginal/anti-arrhythmic effect. asx episodes of sinus hany, would c/w beta blockers for now. has ICD (backup pacing) -> can check device for parameters and to ensure functioning appropriately  -c/w lasix 40mg. appears euvolemic  -c/w dapt and statin for cad/stents  -hx of pAF not on ac, remains on high dose asa and plavix  -multispecialty services appreciated - neuro, gi and ent  -s/p neg esophagram  -s/p ct chest. pulm consult appreciated  -s/p egd neg for perf  -care per medicine          Over 25 minutes spent on total encounter; more than 50% of the visit was spent counseling and/or coordinating care by the attending physician.      Ministerio Dupont MD   Cardiovascular Disease  [phone]

## 2020-02-19 NOTE — DISCHARGE NOTE PROVIDER - NSDCCPCAREPLAN_GEN_ALL_CORE_FT
PRINCIPAL DISCHARGE DIAGNOSIS  Diagnosis: Fever of unknown origin  Assessment and Plan of Treatment: resolved      SECONDARY DISCHARGE DIAGNOSES  Diagnosis: PAD (peripheral artery disease)  Assessment and Plan of Treatment: stable, cont aspirin, plavix    Diagnosis: CAD (coronary artery disease)  Assessment and Plan of Treatment: stable, cont aspirin, plavix    Diagnosis: PAF (paroxysmal atrial fibrillation)  Assessment and Plan of Treatment: rate controlled, no a/c, cont toprol    Diagnosis: Esophagus disorder  Assessment and Plan of Treatment: esophageal injury ruled out PRINCIPAL DISCHARGE DIAGNOSIS  Diagnosis: Fever of unknown origin  Assessment and Plan of Treatment: s/p removal hardware Cervical spine  No need for antibiotics.  Follow up with Dr. Davey in ID clinic in 2 weeks  Follow up with Neurosurgery diana 1-2 weeks.        SECONDARY DISCHARGE DIAGNOSES  Diagnosis: Esophagus disorder  Assessment and Plan of Treatment: Esophagram is negative and no injury or tear    Diagnosis: PAD (peripheral artery disease)  Assessment and Plan of Treatment: Stable, continue Aspirin and Plavix    Diagnosis: CAD (coronary artery disease)  Assessment and Plan of Treatment: Coronary artery disease is a condition where the arteries the supply the heart muscle get clogged with fatty deposits & puts you at risk for a heart attack  Call your doctor if you have any new pain, pressure, or discomfort in the center of your chest, pain, tingling or discomfort in arms, back, neck, jaw, or stomach, shortness of breath, nausea, vomiting, burping or heartburn, sweating, cold and clammy skin, racing or abnormal heartbeat for more than 10 minutes or if they keep coming & going.  Call 911 and do not tr to get to hospital by care  You can help yourself with lefestyle changes (quitting smoking if you smoke), eat lots of fruits & vegetables & low fat dairy products, not a lot of meat & fatty foods, walk or some form of physical activity most days of the week, lose weight if you are overweight  Take your cardiac medication as prescribed to lower cholesterol, to lower blood pressure, aspirin to prevent blood clots, and diabetes control  Make sure to keep appointments with doctor for cardiac follow up care    Diagnosis: PAF (paroxysmal atrial fibrillation)  Assessment and Plan of Treatment: Rate controlled, no anticoagulate, continue Toprol  Atrial fibrillation is the most common heart rhythm problem.  The condition puts you at risk for has stroke and heart attack  It helps if you control your blood pressure, not drink more than 1-2 alcohol drinks per day, cut down on caffeine, getting treatment for over active thyroid gland, and get regular exercise  Call your doctor if you feel your heart racing or beating unusually, chest tightness or pain, lightheaded, faint, shortness of breath especially with exercise  It is important to take your heart medication as prescribed  You may be on anticoagulation which is very important to take as directed - you may need blood work to monitor drug levels

## 2020-02-19 NOTE — CONSULT NOTE ADULT - SUBJECTIVE AND OBJECTIVE BOX
Chief Complaint:  Patient is a 59y old  Female who presents with a chief complaint of abnormal CT C spine (19 Feb 2020 16:04)    HPI:  59F with PMH/PSH including CAD s/p 4vCABG, MI, AICD, stents x 3 on Plavix and ASA, HLD, pAFib, chronic back pain, T10-L5 posterior spinal fusion for lumbar disc disease with radiculopathy 6/19/14), hip osteoarthritis s/p total hip arthroplasty 7/13/15, s/p open posterior fusion of 2 or more  joints of posterior column of L vertebrae (11/5/15) presents to the ED sent in by Dr. Dupont with a note reading "C spine with gas, implying erosion into esophagus or gas forming bacteria around the screw."  Patient reports that she has been having fevers for two months, intermittently lasting about 8 hours each time and spontaneously resolving.  Reports has been hoarse for many years, reports was sent to ENT, scoped and sent for CT C spine showing that stated result.  Pt had this CT 2 weeks ago but did not have fever so did not feel she had to come in.  Does report neck pain but no numbness weakness to UE's.  No other focal deficit.  Fevers get better and wrose on their own. (13 Feb 2020 19:49)    Current out- patient pain regimen: Oxycodone 30 mg every 6 hours    Out Patient Pain Management provider: Dr. Dupont PCP    [facility] Prescription Monitoring Program: Reference #[id number]    Pt seen lying in bed. States she has discomfort in neck area radiating to B/L UE. Anticipates discharge today or tomorrow with out-patient follow up.  Pt c/o heartburn post EGD, GI spoke with patient and will recommend antacid. Informed patient that there were no GI findings related to infection or fistula. Pt appears comfortable, NAD, well-groomed, well-developed, no signs of toxicity, Alert & Oriented X3, Good concentration, moves all extremities equally against gravity.       PAST MEDICAL & SURGICAL HISTORY:  AICD (automatic cardioverter/defibrillator) present  Pseudoarthrosis of lumbar spine  Stented coronary artery: x 3 ESTEVAN  Degenerative disc disease, lumbar  PAD (peripheral artery disease): r leg 06  Hypercholesteremia  Reflux  Back pain  MI (myocardial infarction)  Arthritis  CAD (Coronary Artery Disease): CARDIAC CATH PTCA/Sapphire x 3 to SVG to RCA 08/2009  Atrial Fibrillation: paroxismal Dx in 08/2009  Elective surgery: Left leg &quot;clean out&quot; secondary PAD  H/O cervical spine surgery  History of back surgery: T10 - L4 posterior fusion  Elective surgery: Right leg art bypass 2006  S/P lumbar spine operation: laminectomy  Elective surgery: AICD 2006  S/P coronary artery stent placement: x 3  - 2010  S/P coronary artery bypass graft x 4: 2006      FAMILY HISTORY:  Family history of heart disease: Mom and Dad - CABG x 4  Family history of coronary arteriosclerosis      Allergies  No Known Allergies      MEDICATIONS  (STANDING):  aspirin 325 milliGRAM(s) Oral daily  atorvastatin 40 milliGRAM(s) Oral at bedtime  clopidogrel Tablet 75 milliGRAM(s) Oral daily  furosemide    Tablet 40 milliGRAM(s) Oral daily  heparin  Injectable 5000 Unit(s) SubCutaneous every 12 hours  losartan 50 milliGRAM(s) Oral two times a day  metoprolol succinate ER 50 milliGRAM(s) Oral two times a day  oxyCODONE  ER Tablet 10 milliGRAM(s) Oral every 12 hours  pantoprazole    Tablet 40 milliGRAM(s) Oral before breakfast  sodium chloride 0.9%. 1000 milliLiter(s) (75 mL/Hr) IV Continuous <Continuous>    MEDICATIONS  (PRN):  calcium carbonate    500 mG (Tums) Chewable 1 Tablet(s) Chew three times a day PRN Indigestion  diazepam    Tablet 10 milliGRAM(s) Oral four times a day PRN anxiety  oxyCODONE    IR 30 milliGRAM(s) Oral every 4 hours PRN Severe Pain (7 - 10)      Vital Signs:  T(C): 36.9 (02-19-20 @ 16:40)  HR: 66 (02-19-20 @ 16:40)  BP: 98/59 (02-19-20 @ 16:40)  RR: 18 (02-19-20 @ 16:40)  SpO2: 98% (02-19-20 @ 16:40)    Pertinent labs/radiology:  Labs reviewed    < from: CT Neck Soft Tissue w/ IV Cont (02.13.20 @ 21:03) >  No significant interval change in appearance of fractured right C5 vertebral body screw. The fractured screw fragment is embedded in the right prevertebral soft tissues. Adjacent to this, there is a similar appearing focus of retropharyngeal air asymmetric to the right at the C5 and C6 levels. This could represent the presence of esophageal injury.    Previously seen 7 mm nodule along the superior aspect of the scarring appears slightly increased in size, currently measuring 8 mm in greatest dimension. Correlation with CT chest is recommended for further evaluation.    < end of copied text >

## 2020-02-19 NOTE — DIETITIAN INITIAL EVALUATION ADULT. - CONTINUE CURRENT NUTRITION CARE PLAN
yes/soft diet, noted pt previously on regular diet, may consider advancing diet if/when pt agreeable

## 2020-02-19 NOTE — CONSULT NOTE ADULT - CONSULT REASON
concern for esophageal fistula
"Esophageal fistula on CT neck".
Evaluation of possible esophageal perforation
Hardware failure C5-C6
Pain Management
abnormal chest ct
cardiomyopathy, cardiac optimization
possible spine hardware infection

## 2020-02-19 NOTE — PROGRESS NOTE ADULT - ASSESSMENT
59 F PMHx of CAD, Afib with PPM, and multiple spinal surgeries with hardware who presented to the ED with fevers  Subjective fever, no leukocytosis  Patient very well appearing, does not appear toxic, no complaints aside from neck pain  CT spine with evidence of hardware loosening, also associated air concerning for possible R sided fistula and air/debris  Aside from elevated ESR, no signs systemic infection  Despite this, still significant concern for radiographic findings suspicious for infection vs hardware loosening  New CT with no significant change  Patient still with no clinical signs of infection at bedside, no fevers, no leukocytosis  CT chest with ground glass opacities--I reviewed personally  Note patient has no clinical signs of Pna  EGD reassuring  Workup for fistula negative  Overall,  1) Abnormal Finding on Imaging  - Monitor off abx for now  - BCX NGTD  - From ID perspective, seems less likely infection, plan to monitor patient off abx. If signs/symptoms infection, anticipate would need abx for diagnostic and therapeutic purpose, but defer role for OR to surgical teams.  2) Hardware Loosening  - F/U neurosurgery  3) Fever  - Monitor for further fevers  - If signs sepsis or worsening, would start Vanco/Zosyn (low threshold)    When DC planning, follow up with me in ID clinic in 2 weeks.    Mars Davey MD  Pager [phone]  After 5pm and on weekends call [phone]

## 2020-02-19 NOTE — PROGRESS NOTE ADULT - ASSESSMENT
Pt's original finding of air in the neck remains worrisome.  I recommend repeating the neck CT to see if this has resolved,  Given the normal esophagram there does not appear to be a reason to explore her neck at this time.

## 2020-02-19 NOTE — PROGRESS NOTE ADULT - ASSESSMENT
59 year old female with CAD s/p 4vCABG, MI, AICD, stents x 3 (on Plavix and ASA), HLD, pAFib, chronic back pain, T10-L5 posterior spinal fusion for lumbar disc disease with radiculopathy 6/19/14), hip osteoarthritis s/p total hip arthroplasty 7/13/15, s/p open posterior fusion of 2 or more  joints of posterior column of L vertebrae (11/5/15) presents to the ED sent in by Dr. Dupont with a note reading "C spine with gas, implying erosion into esophagus or gas forming bacteria around the screw."     IMPRESSION  - Abnormal CT scan concerning for esophageal fistula; now s/p esophagram on 2.14 which was unremarkable and s/p EGD on 2.18.2020 with normal esophagus   CT on 1.2020 with pocket of air/debris adjacent to the right side of the fixation plate and the retropharyngeal soft tissues, which may represent a fistulous connection anteriorly to the esophagus or airway though no definitive connection is visualized,     - Multiple cardiac comorbidities: CAD s/p 4vCABG, AICD, stents x 3 (on Plavix and ASA), pAFib    RECOMMENDATION    - no further intervention from GI given normal EGD done on 2.18.2020  - rest of care per primary team  - patient can follow up with GI as an outpatient on discharge ([facility]: [phone], [facility]: [phone], Attending Office: [phone])    Please call us back as needed     Cristofer Person, PGY-6  GI fellow  B- [phone]/ [phone]  Please call GI fellow on call after 5pm and on weekends

## 2020-02-19 NOTE — DISCHARGE NOTE PROVIDER - NSDCMRMEDTOKEN_GEN_ALL_CORE_FT
aspirin 325 mg oral tablet: 1 tab(s) orally once a day  atorvastatin 40 mg oral tablet: 1 tab(s) orally once a day  diazePAM 10 mg oral tablet: 1 tab(s) orally 4 times a day, As Needed  ezetimibe 10 mg oral tablet: 1 tab(s) orally once a day  furosemide 40 mg oral tablet: 1 tab(s) orally once a day  losartan 50 mg oral tablet: 1 tab(s) orally 2 times a day  Metoprolol Succinate ER 50 mg oral tablet, extended release: 1 tab(s) orally 2 times a day  oxyCODONE 30 mg oral tablet: 1 tab(s) orally 4 times a day  pantoprazole 40 mg oral delayed release tablet: 1 tab(s) orally once a day  Plavix 75 mg oral tablet: 1 tab(s) orally once a day aspirin 325 mg oral tablet: 1 tab(s) orally once a day  atorvastatin 40 mg oral tablet: 1 tab(s) orally once a day  diazePAM 10 mg oral tablet: 1 tab(s) orally 4 times a day, As Needed  ezetimibe 10 mg oral tablet: 1 tab(s) orally once a day  furosemide 40 mg oral tablet: 1 tab(s) orally once a day  losartan 50 mg oral tablet: 1 tab(s) orally 2 times a day  Metoprolol Succinate ER 50 mg oral tablet, extended release: 1 tab(s) orally 2 times a day  oxyCODONE 10 mg oral tablet, extended release: 1 tab(s) orally every 12 hours  oxyCODONE 30 mg oral tablet: 1 tab(s) orally every 4 hours, As needed, Severe Pain (7 - 10)  pantoprazole 40 mg oral delayed release tablet: 1 tab(s) orally once a day  Plavix 75 mg oral tablet: 1 tab(s) orally once a day aspirin 325 mg oral tablet: 1 tab(s) orally once a day  atorvastatin 40 mg oral tablet: 1 tab(s) orally once a day  diazePAM 10 mg oral tablet: 1 tab(s) orally 4 times a day, As Needed MDD:4  ezetimibe 10 mg oral tablet: 1 tab(s) orally once a day  furosemide 40 mg oral tablet: 1 tab(s) orally once a day  losartan 50 mg oral tablet: 1 tab(s) orally 2 times a day  Metoprolol Succinate ER 50 mg oral tablet, extended release: 1 tab(s) orally 2 times a day  oxyCODONE 10 mg oral tablet, extended release: 1 tab(s) orally every 12 hours  oxyCODONE 10 mg oral tablet, extended release: 1 tab(s) orally every 12 hours MDD:2  oxyCODONE 30 mg oral tablet: 1 tab(s) orally every 4 hours, As needed, Severe Pain (7 - 10)  pantoprazole 40 mg oral delayed release tablet: 1 tab(s) orally once a day  Plavix 75 mg oral tablet: 1 tab(s) orally once a day aspirin 325 mg oral tablet: 1 tab(s) orally once a day  atorvastatin 40 mg oral tablet: 1 tab(s) orally once a day  cervical collar : Dx: post operative cervical collar for stability  ezetimibe 10 mg oral tablet: 1 tab(s) orally once a day  furosemide 40 mg oral tablet: 1 tab(s) orally once a day  losartan 50 mg oral tablet: 1 tab(s) orally 2 times a day  Metoprolol Succinate ER 50 mg oral tablet, extended release: 1 tab(s) orally 2 times a day  oxyCODONE 10 mg oral tablet, extended release: 1 tab(s) orally every 12 hours  oxyCODONE 30 mg oral tablet: 1 tab(s) orally every 4 hours, As needed, Severe Pain (7 - 10)  pantoprazole 40 mg oral delayed release tablet: 1 tab(s) orally once a day  Plavix 75 mg oral tablet: 1 tab(s) orally once a day aspirin 325 mg oral tablet: 1 tab(s) orally once a day  atorvastatin 40 mg oral tablet: 1 tab(s) orally once a day  cervical collar : Dx: post operative cervical collar for stability  ezetimibe 10 mg oral tablet: 1 tab(s) orally once a day  furosemide 40 mg oral tablet: 1 tab(s) orally once a day  losartan 50 mg oral tablet: 1 tab(s) orally 2 times a day  Metoprolol Succinate ER 50 mg oral tablet, extended release: 1 tab(s) orally 2 times a day  oxyCODONE 30 mg oral tablet: 1 tab(s) orally every 4 hours, As needed, Severe Pain (7 - 10)  oxyCODONE 30 mg oral tablet: 1 tab(s) orally every 4 hours, As needed, Severe Pain (7 - 10) MDD:No more then 6 doses daily  pantoprazole 40 mg oral delayed release tablet: 1 tab(s) orally once a day  Plavix 75 mg oral tablet: 1 tab(s) orally once a day

## 2020-02-19 NOTE — DISCHARGE NOTE PROVIDER - PROVIDER TOKENS
PROVIDER:[TOKEN:[[id number]]] PROVIDER:[TOKEN:[[id number]]],PROVIDER:[TOKEN:[[id number]]] PROVIDER:[TOKEN:[[id number]]],PROVIDER:[TOKEN:[[id number]]],PROVIDER:[TOKEN:[[id number]]],PROVIDER:[TOKEN:[[id number]]],PROVIDER:[TOKEN:[[id number]]] PROVIDER:[TOKEN:[[id number]]],PROVIDER:[TOKEN:[[id number]]],PROVIDER:[TOKEN:[[id number]]],PROVIDER:[TOKEN:[[id number]]],PROVIDER:[TOKEN:[[id number]]],PROVIDER:[TOKEN:[[id number]]]

## 2020-02-19 NOTE — DISCHARGE NOTE PROVIDER - HOSPITAL COURSE
59F with PMH/PSH including CAD s/p 4vCABG, MI, AICD, stents x 3 on Plavix and ASA, HLD, pAFib, chronic back pain, T10-L5 posterior spinal fusion for lumbar disc disease with radiculopathy 6/19/14), hip osteoarthritis s/p total hip arthroplasty 7/13/15, s/p open posterior fusion of 2 or more  joints of posterior column of L vertebrae (11/5/15) presents to the ED sent in by Dr. Dupont with a note reading "C spine with gas, implying erosion into esophagus or gas forming bacteria around the screw."  Patient reports that she has been having fevers for two months, intermittently lasting about 8 hours each time and spontaneously resolving.    She was seen by NS, Thoracic Surg, ID, GI, ENT, card, and pulm. There was  no neurosurgical intervention. 59F with PMH/PSH including CAD s/p 4vCABG, MI, AICD, stents x 3 on Plavix and ASA, HLD, pAFib, chronic back pain, T10-L5 posterior spinal fusion for lumbar disc disease with radiculopathy 6/19/14), hip osteoarthritis s/p total hip arthroplasty 7/13/15, s/p open posterior fusion of 2 or more  joints of posterior column of L vertebrae (11/5/15) presents to the ED sent in by Dr. Dupont with a note reading "C spine with gas, implying erosion into esophagus or gas forming bacteria around the screw."  Patient reports that she has been having fevers for two months, intermittently lasting about 8 hours each time and spontaneously resolving.    She was seen by NS, Thoracic Surg, ID, GI, ENT, card, and pulm. There was  no neurosurgical intervention. Esophagram is negative and no injury or tear. 59F with PMH/PSH including CAD s/p 4vCABG, MI, AICD, stents x 3 on Plavix and ASA, HLD, pAFib, chronic back pain, T10-L5 posterior spinal fusion for lumbar disc disease with radiculopathy 6/19/14), hip osteoarthritis s/p total hip arthroplasty 7/13/15, s/p open posterior fusion of 2 or more  joints of posterior column of L vertebrae (11/5/15) presents to the ED sent in by Dr. Dupont with a note reading "C spine with gas, implying erosion into esophagus or gas forming bacteria around the screw."  Patient reports that she has been having fevers for two months, intermittently lasting about 8 hours each time and spontaneously resolving.    She was seen by NS, Thoracic Surg, ID, GI, ENT, card, pulm and PT who recomm IRIS . There was  no neurosurgical intervention. Esophagram is negative and no injury or tear. 59F with PMH/PSH including CAD s/p 4vCABG, MI, AICD, stents x 3 on Plavix and ASA, HLD, pAFib, chronic back pain, T10-L5 posterior spinal fusion for lumbar disc disease with radiculopathy 6/19/14), hip osteoarthritis s/p total hip arthroplasty 7/13/15, s/p open posterior fusion of 2 or more  joints of posterior column of L vertebrae (11/5/15) presents to the ED sent in by Dr. Dupont with a note reading "C spine with gas, implying erosion into esophagus or gas forming bacteria around the screw."  Patient reports that she has been having fevers for two months, intermittently lasting about 8 hours each time and spontaneously resolving.    She was seen by NS, Thoracic Surg, ID, GI, ENT, card, pulm and PT who recomm IRIS . There was  no neurosurgical intervention. Esophagram is negative and no injury or tear.    Repeat CT  showed  decreased size of  air collection. Pt remained afebrile , no clinical  s/s of active infection will  be discharged home with close follow up form ENT , Pulmonary  and thoracic sx and PCP 59F with PMH/PSH including CAD s/p 4vCABG, MI, AICD, stents x 3 on Plavix and ASA, HLD, pAFib, chronic back pain, T10-L5 posterior spinal fusion for lumbar disc disease with radiculopathy 6/19/14), hip osteoarthritis s/p total hip arthroplasty 7/13/15, s/p open posterior fusion of 2 or more  joints of posterior column of L vertebrae (11/5/15) presents to the ED sent in by Dr. Dupont with a note reading "C spine with gas, implying erosion into esophagus or gas forming bacteria around the screw."  Patient reports that she has been having fevers for two months, intermittently lasting about 8 hours each time and spontaneously resolving.    She was seen by NS, Thoracic Surg, ID, GI, ENT, card, pulm and PT who recomm IRIS .  Is s/p removal hardware from cervical spine. There was no neurosurgical intervention. Esophagram is negative and no injury or tear.    Repeat CT showed decreased size of  air collection. Pt remained afebrile , no clinical  s/s of active infection will be discharged home with close follow up with ENT, Pulmonary, Thoracic sx and PCP

## 2020-02-19 NOTE — DISCHARGE NOTE PROVIDER - NSDCFUSCHEDAPPT_GEN_ALL_CORE_FT
MADELINE HUERTA ; 03/25/2020 ; NPP Otolaryng [facility] MADELINE HUERTA ; 03/25/2020 ; NPP Otolaryng [facility] MADELINE HUERTA ; 03/25/2020 ; NPP Otolaryng [facility] MADELINE HUERTA ; 03/25/2020 ; NPP Otolaryng [facility] MADELINE HUERTA ; 03/25/2020 ; NPP Otolaryng [address] [facility] MADELINE HUERTA ; 03/25/2020 ; NPP Otolaryng [facility] MADELINE HUERTA ; 03/25/2020 ; NPP Otolaryng [facility]

## 2020-02-20 PROCEDURE — 70490 CT SOFT TISSUE NECK W/O DYE: CPT | Mod: 26

## 2020-02-20 PROCEDURE — 99232 SBSQ HOSP IP/OBS MODERATE 35: CPT

## 2020-02-20 RX ORDER — OXYCODONE HYDROCHLORIDE 5 MG/1
1 TABLET ORAL
Qty: 6 | Refills: 0
Start: 2020-02-20 | End: 2020-02-22

## 2020-02-20 RX ORDER — DIAZEPAM 5 MG
1 TABLET ORAL
Qty: 12 | Refills: 0
Start: 2020-02-20 | End: 2020-02-22

## 2020-02-20 RX ORDER — DIAZEPAM 5 MG
1 TABLET ORAL
Qty: 0 | Refills: 0 | DISCHARGE

## 2020-02-20 RX ADMIN — OXYCODONE HYDROCHLORIDE 10 MILLIGRAM(S): 5 TABLET ORAL at 05:19

## 2020-02-20 RX ADMIN — Medication 10 MILLIGRAM(S): at 13:56

## 2020-02-20 RX ADMIN — OXYCODONE HYDROCHLORIDE 30 MILLIGRAM(S): 5 TABLET ORAL at 15:06

## 2020-02-20 RX ADMIN — OXYCODONE HYDROCHLORIDE 30 MILLIGRAM(S): 5 TABLET ORAL at 12:48

## 2020-02-20 RX ADMIN — OXYCODONE HYDROCHLORIDE 30 MILLIGRAM(S): 5 TABLET ORAL at 05:19

## 2020-02-20 RX ADMIN — ATORVASTATIN CALCIUM 40 MILLIGRAM(S): 80 TABLET, FILM COATED ORAL at 21:20

## 2020-02-20 RX ADMIN — OXYCODONE HYDROCHLORIDE 10 MILLIGRAM(S): 5 TABLET ORAL at 05:49

## 2020-02-20 RX ADMIN — OXYCODONE HYDROCHLORIDE 10 MILLIGRAM(S): 5 TABLET ORAL at 17:17

## 2020-02-20 RX ADMIN — HEPARIN SODIUM 5000 UNIT(S): 5000 INJECTION INTRAVENOUS; SUBCUTANEOUS at 17:17

## 2020-02-20 RX ADMIN — Medication 325 MILLIGRAM(S): at 11:02

## 2020-02-20 RX ADMIN — PANTOPRAZOLE SODIUM 40 MILLIGRAM(S): 20 TABLET, DELAYED RELEASE ORAL at 05:19

## 2020-02-20 RX ADMIN — OXYCODONE HYDROCHLORIDE 10 MILLIGRAM(S): 5 TABLET ORAL at 06:30

## 2020-02-20 RX ADMIN — Medication 50 MILLIGRAM(S): at 05:19

## 2020-02-20 RX ADMIN — HEPARIN SODIUM 5000 UNIT(S): 5000 INJECTION INTRAVENOUS; SUBCUTANEOUS at 05:20

## 2020-02-20 RX ADMIN — OXYCODONE HYDROCHLORIDE 30 MILLIGRAM(S): 5 TABLET ORAL at 21:20

## 2020-02-20 RX ADMIN — LOSARTAN POTASSIUM 50 MILLIGRAM(S): 100 TABLET, FILM COATED ORAL at 05:20

## 2020-02-20 RX ADMIN — OXYCODONE HYDROCHLORIDE 30 MILLIGRAM(S): 5 TABLET ORAL at 16:29

## 2020-02-20 RX ADMIN — Medication 40 MILLIGRAM(S): at 05:20

## 2020-02-20 RX ADMIN — CLOPIDOGREL BISULFATE 75 MILLIGRAM(S): 75 TABLET, FILM COATED ORAL at 11:02

## 2020-02-20 RX ADMIN — Medication 1 TABLET(S): at 11:02

## 2020-02-20 RX ADMIN — OXYCODONE HYDROCHLORIDE 30 MILLIGRAM(S): 5 TABLET ORAL at 11:02

## 2020-02-20 RX ADMIN — OXYCODONE HYDROCHLORIDE 30 MILLIGRAM(S): 5 TABLET ORAL at 05:49

## 2020-02-20 RX ADMIN — OXYCODONE HYDROCHLORIDE 10 MILLIGRAM(S): 5 TABLET ORAL at 17:47

## 2020-02-20 NOTE — PROGRESS NOTE ADULT - SUBJECTIVE AND OBJECTIVE BOX
Patient is a 59y old  Female who presents with a chief complaint of abnormal CT C spine (20 Feb 2020 15:38)      SUBJECTIVE / OVERNIGHT EVENTS: No new complaints.   Review of Systems  chest pain no  palpitations no  sob no  nausea no  headache no    MEDICATIONS  (STANDING):  aspirin 325 milliGRAM(s) Oral daily  atorvastatin 40 milliGRAM(s) Oral at bedtime  clopidogrel Tablet 75 milliGRAM(s) Oral daily  furosemide    Tablet 40 milliGRAM(s) Oral daily  heparin  Injectable 5000 Unit(s) SubCutaneous every 12 hours  losartan 50 milliGRAM(s) Oral two times a day  metoprolol succinate ER 50 milliGRAM(s) Oral two times a day  oxyCODONE  ER Tablet 10 milliGRAM(s) Oral every 12 hours  pantoprazole    Tablet 40 milliGRAM(s) Oral before breakfast  sodium chloride 0.9%. 1000 milliLiter(s) (75 mL/Hr) IV Continuous <Continuous>    MEDICATIONS  (PRN):  calcium carbonate    500 mG (Tums) Chewable 1 Tablet(s) Chew three times a day PRN Indigestion  diazepam    Tablet 10 milliGRAM(s) Oral four times a day PRN anxiety  oxyCODONE    IR 30 milliGRAM(s) Oral every 4 hours PRN Severe Pain (7 - 10)      Vital Signs Last 24 Hrs  T(C): 36.4 (20 Feb 2020 21:07), Max: 36.9 (19 Feb 2020 22:18)  T(F): 97.6 (20 Feb 2020 21:07), Max: 98.4 (19 Feb 2020 22:18)  HR: 56 (20 Feb 2020 21:07) (56 - 62)  BP: 108/57 (20 Feb 2020 21:07) (96/60 - 120/69)  BP(mean): --  RR: 18 (20 Feb 2020 21:07) (18 - 18)  SpO2: 98% (20 Feb 2020 21:07) (95% - 98%)    PHYSICAL EXAM:  GENERAL: NAD, well-developed  HEAD:  Atraumatic, Normocephalic  EYES: EOMI, PERRLA, conjunctiva and sclera clear  NECK: Supple, No JVD  CHEST/LUNG: Clear to auscultation bilaterally; No wheeze  HEART: Regular rate and rhythm; No murmurs, rubs, or gallops  ABDOMEN: Soft, Nontender, Nondistended; Bowel sounds present  EXTREMITIES:  2+ Peripheral Pulses, No clubbing, cyanosis, or edema  PSYCH: AAOx3  NEUROLOGY: non-focal  SKIN: No rashes or lesions    LABS:                        10.6   5.00  )-----------( 267      ( 19 Feb 2020 06:42 )             31.9     02-19    143  |  101  |  12  ----------------------------<  105<H>  3.9   |  29  |  1.05    Ca    9.7      19 Feb 2020 06:42                  RADIOLOGY & ADDITIONAL TESTS:    Imaging Personally Reviewed:  < from: CT Neck Soft Tissue No Cont (02.20.20 @ 09:20) >    IMPRESSION:    Redemonstration of fractured screw fragment dated in the right prevertebral soft tissues at the C5-C6 level.    Redemonstration of a focus of retropharyngeal air adjacent to the fractured screw fragment.  The focus of air is minimally smaller, currently measuring currently measuring 0.4 x 1.8 x 1.3 cm, previously measuring 0.6 x 1.8 x 1.3 cm. New aerated secretions within in the collection of air.      < end of copied text >    Consultant(s) Notes Reviewed:      Care Discussed with Consultants/Other Providers:

## 2020-02-20 NOTE — PROGRESS NOTE ADULT - ASSESSMENT
59 f with    Abnormal hardware Cervical spine  - ID evaluation/ observe off antibiotics  - Neurosurgical follow. Await recommendations .  - pain control  - ENT follow. Not cleared for DC    Possible esophageal fistula  - GI follow.  - EGD noted.   - esophagogram with no perforation    AICD (automatic cardioverter/defibrillator) present   - EP check    Arthritis   - pain control    Atrial Fibrillation paroxismal Dx in 08/2009  rate control    Back pain   - pain control    CAD (Coronary Artery Disease)   - continue Rx  - cardiology evaluation     Hypercholesteremia   - continue Rx    PAD (peripheral artery disease) r leg 06  -stable    Lung opacities  - Pulmonary evaluation Dr. Gutierrez noted    d/w patient at length    DCP home if no surgery and cleared by ENT and NeuroSx..     Mega Zimmerman MD pager [phone]

## 2020-02-20 NOTE — PROGRESS NOTE ADULT - SUBJECTIVE AND OBJECTIVE BOX
CC: F/U for abnormal finding on imaging    Saw/spoke to patient. No fevers, no chills. No new complaints. Some neck pain, otherwise no new complaints.    Allergies  No Known Allergies    ANTIMICROBIALS:  Off    PE:    Vital Signs Last 24 Hrs  T(C): 36.7 (20 Feb 2020 16:16), Max: 36.9 (19 Feb 2020 22:18)  T(F): 98.1 (20 Feb 2020 16:16), Max: 98.4 (19 Feb 2020 22:18)  HR: 62 (20 Feb 2020 16:16) (60 - 68)  BP: 98/60 (20 Feb 2020 16:16) (96/60 - 120/69)  RR: 18 (20 Feb 2020 16:16) (18 - 18)  SpO2: 98% (20 Feb 2020 16:16) (95% - 98%)    Gen: AOx3, NAD, non-toxic  CV: S1+S2 normal, nontachycardic  Resp: Clear bilat, no resp distress, no crackles/wheezes  Abd: Soft, nontender, +BS  Ext: No LE edema, no wounds    LABS:                        10.6   5.00  )-----------( 267      ( 19 Feb 2020 06:42 )             31.9     02-19    143  |  101  |  12  ----------------------------<  105<H>  3.9   |  29  |  1.05    Ca    9.7      19 Feb 2020 06:42    MICROBIOLOGY:    .Blood Blood-Peripheral  02-13-20   No growth at 5 days.    .Blood Blood-Peripheral  02-13-20   No growth at 5 days.    .Urine Clean Catch (Midstream)  02-13-20   No growth    RADIOLOGY:    2/20 CT:    IMPRESSION:    Redemonstration of fractured screw fragment dated in the right prevertebral soft tissues at the C5-C6 level.    Redemonstration of a focus of retropharyngeal air adjacent to the fractured screw fragment.  The focus of air is minimally smaller, currently measuring currently measuring 0.4 x 1.8 x 1.3 cm, previously measuring 0.6 x 1.8 x 1.3 cm. New aerated secretions within in the collection of air.

## 2020-02-20 NOTE — PROGRESS NOTE ADULT - ASSESSMENT
59 F PMHx of CAD, Afib with PPM, and multiple spinal surgeries with hardware who presented to the ED with fevers  Subjective fever, no leukocytosis  Patient very well appearing, does not appear toxic, no complaints aside from neck pain  CT spine with evidence of hardware loosening, also associated air concerning for possible R sided fistula and air/debris  Aside from elevated ESR, no signs systemic infection  Despite this, still significant concern for radiographic findings suspicious for infection vs hardware loosening  New CT with no significant change  CT chest with ground glass opacities--I reviewed personally  CT neck, still somewhat unchanged  EGD reassuring  Workup for fistula negative  Patient otherwise not ill appearing, no clinical signs infection  Overall,  1) Abnormal Finding on Imaging  - Monitor off abx  - BCX NGTD  - From ID perspective, seems less likely infection, plan to monitor patient off abx. If signs/symptoms infection, anticipate would need procedure for diagnostic and therapeutic purpose, but defer role for OR to surgical teams.  2) Hardware Loosening  - F/U neurosurgery  3) Fever  - Monitor for further fevers  - If signs sepsis or worsening, would start Vanco/Zosyn (low threshold)    When DC planning, follow up with me in ID clinic in 2 weeks.    Mars Davey MD  Pager [phone]  After 5pm and on weekends call [phone]

## 2020-02-20 NOTE — PROGRESS NOTE ADULT - ASSESSMENT
58 yo female with free air around loose screw in cervical hardware unchanged from prior CT scan about 2 weeks prior despite negative esophagram and EGD.. pt non toxic, has not been on abx

## 2020-02-20 NOTE — PROGRESS NOTE ADULT - SUBJECTIVE AND OBJECTIVE BOX
ENT ISSUE/POD: loose cervical hardware    HPI: 58 yo female hx of cervical spine fusion found to have loose hardware on CT scan after c/o hoarseness, pt otherwise w no other new complaints currently tolerating soft diet, afebrile, normal wbc        PAST MEDICAL & SURGICAL HISTORY:  AICD (automatic cardioverter/defibrillator) present  Pseudoarthrosis of lumbar spine  Stented coronary artery: x 3 ESTEVAN  Degenerative disc disease, lumbar  PAD (peripheral artery disease): r leg 06  Hypercholesteremia  Reflux  Back pain  MI (myocardial infarction)  Arthritis  CAD (Coronary Artery Disease): CARDIAC CATH PTCA/[place] x 3 to SVG to RCA 08/2009  Atrial Fibrillation: paroxismal Dx in 08/2009  Elective surgery: Left leg &quot;clean out&quot; secondary PAD  H/O cervical spine surgery  History of back surgery: T10 - L4 posterior fusion  Elective surgery: Right leg art bypass 2006  S/P lumbar spine operation: laminectomy  Elective surgery: AICD 2006  S/P coronary artery stent placement: x 3  - 2010  S/P coronary artery bypass graft x 4: 2006    Allergies    No Known Allergies    Intolerances      MEDICATIONS  (STANDING):  aspirin 325 milliGRAM(s) Oral daily  atorvastatin 40 milliGRAM(s) Oral at bedtime  clopidogrel Tablet 75 milliGRAM(s) Oral daily  furosemide    Tablet 40 milliGRAM(s) Oral daily  heparin  Injectable 5000 Unit(s) SubCutaneous every 12 hours  losartan 50 milliGRAM(s) Oral two times a day  metoprolol succinate ER 50 milliGRAM(s) Oral two times a day  oxyCODONE  ER Tablet 10 milliGRAM(s) Oral every 12 hours  pantoprazole    Tablet 40 milliGRAM(s) Oral before breakfast  sodium chloride 0.9%. 1000 milliLiter(s) (75 mL/Hr) IV Continuous <Continuous>    MEDICATIONS  (PRN):  calcium carbonate    500 mG (Tums) Chewable 1 Tablet(s) Chew three times a day PRN Indigestion  diazepam    Tablet 10 milliGRAM(s) Oral four times a day PRN anxiety  oxyCODONE    IR 30 milliGRAM(s) Oral every 4 hours PRN Severe Pain (7 - 10)      ROS:   ENT: all negative except as noted in HPI   Pulm: denies SOB, cough, hemoptysis  Neuro: denies numbness/tingling, loss of sensation  Endo: denies heat/cold intolerance, excessive sweating      Vital Signs Last 24 Hrs  T(C): 36.7 (20 Feb 2020 15:01), Max: 36.9 (19 Feb 2020 16:40)  T(F): 98 (20 Feb 2020 15:01), Max: 98.4 (19 Feb 2020 16:40)  HR: 60 (20 Feb 2020 15:01) (60 - 68)  BP: 112/69 (20 Feb 2020 15:01) (96/60 - 120/69)  BP(mean): --  RR: 18 (20 Feb 2020 15:01) (18 - 18)  SpO2: 95% (20 Feb 2020 15:01) (95% - 98%)                          10.6   5.00  )-----------( 267      ( 19 Feb 2020 06:42 )             31.9    02-19    143  |  101  |  12  ----------------------------<  105<H>  3.9   |  29  |  1.05    Ca    9.7      19 Feb 2020 06:42         PHYSICAL EXAM:  Gen: NAD  Skin: No rashes, bruises, or lesions  Head: Normocephalic, Atraumatic  Face: no edema, erythema, or fluctuance. Parotid glands soft without mass  Eyes: no scleral injection  Nose: Nares bilaterally patent, no discharge  Mouth: No Stridor / Drooling / Trismus.  Mucosa moist, tongue/uvula midline, oropharynx clear  Neck: Flat, supple, no lymphadenopathy, trachea midline, no masses  Lymphatic: No lymphadenopathy  Resp: breathing easily, no stridor  Neuro: facial nerve intact, no facial droop      EXAM: CT NECK SOFT TISSUE     PROCEDURE DATE: 02/20/2020     INTERPRETATION: Noncontrast CT of the neck     CLINICAL INDICATION: S/P spinal fusion, air noted on CT suggesting   esophageal perforation     TECHNIQUE: Axial CT scanning of the neck was obtained without the   intravenous administration of contrast from skull base to the clavicles.   Sagittal and coronal reformats were provided.     COMPARISON: CT soft tissue neck 2/13/2020     FINDINGS:     Redemonstration of anterior cervical discectomy and fusion of C5 and C6 via   anterior fixation plate and paired vertebral body screws at C5 and C6. The   right C5 vertebral body screw is again seen to be fractured. A portion of   the screw is still embedded in the C5 vertebral body. The fractured screw   fragment is embedded in the right prevertebral soft tissues.     Adjacent to this, asymmetric to the right, there is redemonstration of a   focus of retropharyngeal air. The focus of air is minimally smaller,   currently measuring 0.4 x 1.8 x 1.3 cm, previously measuring 0.6 x 1.8 x 1.3   cm (anterior-posterior by transverse by craniocaudad). New aerated   secretions are seen within the collection of air.     Redemonstration of enlarged adenoids containing foci of calcification.   Otherwise, soft tissues surrounding the nasopharynx, oropharynx,   hypopharynx, and larynx are unremarkable.     New aerated secretions in the left piriform sinus.     Apparent 7 mm focus of low density in the left thyroid lobe may represent   artifact, as the lesion was not visualized on the prior examination.     The bilateral submandibular and parotid glands demonstrate no focal lesion.     No lymphadenopathy.     Visualized lung apices clear. No lytic or destructive osseous lesion.     IMPRESSION:     Redemonstration of fractured screw fragment dated in the right prevertebral   soft tissues at the C5-C6 level.     Redemonstration of a focus of retropharyngeal air adjacent to the fractured   screw fragment. The focus of air is minimally smaller, currently measuring   currently measuring 0.4 x 1.8 x 1.3 cm, previously measuring 0.6 x 1.8 x 1.3   cm. New aerated secretions within in the collection of air.

## 2020-02-20 NOTE — PROGRESS NOTE ADULT - SUBJECTIVE AND OBJECTIVE BOX
Cardiovascular Disease Progress Note    Overnight events: No acute events overnight.  no new cardiac sx  Otherwise review of systems negative    Objective Findings:  T(C): 36.7 (20 @ 05:20), Max: 37.1 (20 @ 10:43)  HR: 62 (20 @ 05:20) (62 - 68)  BP: 119/69 (20 @ 05:20) (95/59 - 119/69)  RR: 18 (20 @ 05:20) (18 - 18)  SpO2: 96% (20 @ 05:20) (93% - 98%)  Wt(kg): --  Daily     Daily Weight in k (2020 14:38)      Physical Exam:  Gen: NAD  HEENT: EOMI  CV: RRR, normal S1 + S2, no m/r/g  Lungs: CTAB  Abd: soft, non-tender  Ext: No edema      Laboratory Data:                        10.6   5.00  )-----------( 267      ( 2020 06:42 )             31.9     -    143  |  101  |  12  ----------------------------<  105<H>  3.9   |  29  |  1.05    Ca    9.7      2020 06:42                Inpatient Medications:  MEDICATIONS  (STANDING):  aspirin 325 milliGRAM(s) Oral daily  atorvastatin 40 milliGRAM(s) Oral at bedtime  clopidogrel Tablet 75 milliGRAM(s) Oral daily  furosemide    Tablet 40 milliGRAM(s) Oral daily  heparin  Injectable 5000 Unit(s) SubCutaneous every 12 hours  losartan 50 milliGRAM(s) Oral two times a day  metoprolol succinate ER 50 milliGRAM(s) Oral two times a day  oxyCODONE  ER Tablet 10 milliGRAM(s) Oral every 12 hours  pantoprazole    Tablet 40 milliGRAM(s) Oral before breakfast  sodium chloride 0.9%. 1000 milliLiter(s) (75 mL/Hr) IV Continuous <Continuous>      Assessment:  -CAD s/p 4vCABG and PCI  -ICM s/p AICD   -HLD  -pAFib  -chronic back pain s/p T10-L5 posterior spinal fusion for lumbar disc disease with radiculopathy   -? C spine with gas, implying erosion into esophagus or gas forming bacteria around the screw    Recs:  -c/w metop and losartan for gdmt for lv dysfunction and anti-anginal/anti-arrhythmic effect. asx episodes of sinus hany, would c/w beta blockers for now. has ICD (backup pacing) -> can check device for parameters and to ensure functioning appropriately (can be done as op)  -c/w lasix 40mg. appears euvolemic  -c/w dapt and statin for cad/stents  -hx of pAF not on ac, remains on high dose asa and plavix  -multispecialty services appreciated - neuro, gi and ent  -s/p neg esophagram  -s/p ct chest. pulm consult appreciated  -s/p egd neg for perf  -care per medicine  -dispo planning      Over 25 minutes spent on total encounter; more than 50% of the visit was spent counseling and/or coordinating care by the attending physician.      Mniisterio Dupont MD   Cardiovascular Disease  [phone]

## 2020-02-21 PROCEDURE — 99232 SBSQ HOSP IP/OBS MODERATE 35: CPT

## 2020-02-21 RX ORDER — LOSARTAN POTASSIUM 100 MG/1
50 TABLET, FILM COATED ORAL
Refills: 0 | Status: DISCONTINUED | OUTPATIENT
Start: 2020-02-21 | End: 2020-02-25

## 2020-02-21 RX ORDER — METOPROLOL TARTRATE 50 MG
50 TABLET ORAL
Refills: 0 | Status: DISCONTINUED | OUTPATIENT
Start: 2020-02-21 | End: 2020-02-24

## 2020-02-21 RX ORDER — DIAZEPAM 5 MG
10 TABLET ORAL
Refills: 0 | Status: DISCONTINUED | OUTPATIENT
Start: 2020-02-21 | End: 2020-02-25

## 2020-02-21 RX ADMIN — Medication 50 MILLIGRAM(S): at 06:48

## 2020-02-21 RX ADMIN — LOSARTAN POTASSIUM 50 MILLIGRAM(S): 100 TABLET, FILM COATED ORAL at 17:55

## 2020-02-21 RX ADMIN — HEPARIN SODIUM 5000 UNIT(S): 5000 INJECTION INTRAVENOUS; SUBCUTANEOUS at 17:45

## 2020-02-21 RX ADMIN — OXYCODONE HYDROCHLORIDE 30 MILLIGRAM(S): 5 TABLET ORAL at 23:09

## 2020-02-21 RX ADMIN — OXYCODONE HYDROCHLORIDE 10 MILLIGRAM(S): 5 TABLET ORAL at 18:43

## 2020-02-21 RX ADMIN — OXYCODONE HYDROCHLORIDE 30 MILLIGRAM(S): 5 TABLET ORAL at 14:10

## 2020-02-21 RX ADMIN — HEPARIN SODIUM 5000 UNIT(S): 5000 INJECTION INTRAVENOUS; SUBCUTANEOUS at 06:48

## 2020-02-21 RX ADMIN — Medication 325 MILLIGRAM(S): at 11:29

## 2020-02-21 RX ADMIN — Medication 40 MILLIGRAM(S): at 06:48

## 2020-02-21 RX ADMIN — ATORVASTATIN CALCIUM 40 MILLIGRAM(S): 80 TABLET, FILM COATED ORAL at 21:05

## 2020-02-21 RX ADMIN — PANTOPRAZOLE SODIUM 40 MILLIGRAM(S): 20 TABLET, DELAYED RELEASE ORAL at 06:48

## 2020-02-21 RX ADMIN — LOSARTAN POTASSIUM 50 MILLIGRAM(S): 100 TABLET, FILM COATED ORAL at 06:48

## 2020-02-21 RX ADMIN — OXYCODONE HYDROCHLORIDE 10 MILLIGRAM(S): 5 TABLET ORAL at 06:48

## 2020-02-21 RX ADMIN — OXYCODONE HYDROCHLORIDE 10 MILLIGRAM(S): 5 TABLET ORAL at 07:20

## 2020-02-21 RX ADMIN — OXYCODONE HYDROCHLORIDE 10 MILLIGRAM(S): 5 TABLET ORAL at 17:45

## 2020-02-21 RX ADMIN — OXYCODONE HYDROCHLORIDE 30 MILLIGRAM(S): 5 TABLET ORAL at 14:40

## 2020-02-21 RX ADMIN — CLOPIDOGREL BISULFATE 75 MILLIGRAM(S): 75 TABLET, FILM COATED ORAL at 11:29

## 2020-02-21 NOTE — PROGRESS NOTE ADULT - ASSESSMENT
59 F PMHx of CAD, Afib with PPM, and multiple spinal surgeries with hardware who presented to the ED with fevers  Subjective fever, no leukocytosis  Patient very well appearing, does not appear toxic, no complaints aside from neck pain  CT spine with evidence of hardware loosening, also associated air concerning for possible R sided fistula and air/debris  Aside from elevated ESR, no signs systemic infection  Despite this, still significant concern for radiographic findings suspicious for infection vs hardware loosening  New CT with no significant change  CT chest with ground glass opacities--I reviewed personally  CT neck, still somewhat unchanged  EGD reassuring  Workup for fistula negative  Patient otherwise not ill appearing, no clinical signs infection  Overall,  1) Abnormal Finding on Imaging  - Monitor off abx  - BCX NGTD  - From ID perspective, seems less likely infection, plan to monitor patient off abx. If signs/symptoms infection, anticipate would need procedure for diagnostic and therapeutic purpose, but defer role and timing for OR to surgical teams  2) Hardware Loosening  - F/U neurosurgery  3) Fever  - Monitor for further fevers  - If signs sepsis or worsening, would start Vanco/Zosyn (low threshold)    When DC planning, follow up with me in ID clinic in 2 weeks  Signing off. Please call with further questions or change in status    Mars Davey MD  Pager [phone]  After 5pm and on weekends call [phone]

## 2020-02-21 NOTE — PROGRESS NOTE ADULT - ASSESSMENT
59 f with    Abnormal hardware Cervical spine  - ID evaluation/ observe off antibiotics  - Neurosurgical follow. For removal next week.  - pain control  - ENT follow. Not cleared for DC    Possible esophageal fistula  - GI follow.  - EGD noted.   - esophagogram with no perforation    AICD (automatic cardioverter/defibrillator) present   - EP check    Arthritis   - pain control    Atrial Fibrillation paroxismal Dx in 08/2009  rate control    Back pain   - pain control    CAD (Coronary Artery Disease)   - continue Rx  - cardiology evaluation   - Hold ASA and Plavix for OR.    Hypercholesteremia   - continue Rx    PAD (peripheral artery disease) r leg 06  -stable    Lung opacities  - Pulmonary evaluation Dr. Gutierrez noted    d/w patient at length     Mega Zimmerman MD pager [phone]

## 2020-02-21 NOTE — PROGRESS NOTE ADULT - SUBJECTIVE AND OBJECTIVE BOX
CC: F/U for Hardware malfunction    Saw/spoke to patient. Patient unchanged. No new complaints/events.    Allergies  No Known Allergies    ANTIMICROBIALS:  Off    PE:    Vital Signs Last 24 Hrs  T(C): 36.8 (21 Feb 2020 09:43), Max: 36.8 (21 Feb 2020 09:43)  T(F): 98.2 (21 Feb 2020 09:43), Max: 98.2 (21 Feb 2020 09:43)  HR: 58 (21 Feb 2020 09:43) (51 - 62)  BP: 106/69 (21 Feb 2020 09:43) (98/60 - 117/71)  RR: 18 (21 Feb 2020 09:43) (18 - 18)  SpO2: 95% (21 Feb 2020 09:43) (95% - 98%)    Gen: AOx3, NAD, non-toxic, pleasant  CV: S1+S2 normal, nontachycardic  Resp: Clear bilat, no resp distress, no crackles/wheezes  Abd: Soft, nontender, +BS  Ext: No LE edema, no wounds    LABS:    No new available    MICROBIOLOGY:    .Blood Blood-Peripheral  02-13-20   No growth at 5 days.    .Blood Blood-Peripheral  02-13-20   No growth at 5 days.    .Urine Clean Catch (Midstream)  02-13-20   No growth    (otherwise reviewed)    RADIOLOGY:    2/20 CT:    IMPRESSION:    Redemonstration of fractured screw fragment dated in the right prevertebral soft tissues at the C5-C6 level.    Redemonstration of a focus of retropharyngeal air adjacent to the fractured screw fragment.  The focus of air is minimally smaller, currently measuring currently measuring 0.4 x 1.8 x 1.3 cm, previously measuring 0.6 x 1.8 x 1.3 cm. New aerated secretions within in the collection of air.

## 2020-02-21 NOTE — PROGRESS NOTE ADULT - SUBJECTIVE AND OBJECTIVE BOX
Patient is a 59y old  Female who presents with a chief complaint of abnormal CT C spine (20 Feb 2020 15:38)      SUBJECTIVE / OVERNIGHT EVENTS: No new complaints.   Review of Systems  chest pain no  palpitations no  sob no  nausea no  headache no    MEDICATIONS  (STANDING):  atorvastatin 40 milliGRAM(s) Oral at bedtime  furosemide    Tablet 40 milliGRAM(s) Oral daily  heparin  Injectable 5000 Unit(s) SubCutaneous every 12 hours  losartan 50 milliGRAM(s) Oral two times a day  metoprolol succinate ER 50 milliGRAM(s) Oral two times a day  oxyCODONE  ER Tablet 10 milliGRAM(s) Oral every 12 hours  pantoprazole    Tablet 40 milliGRAM(s) Oral before breakfast  sodium chloride 0.9%. 1000 milliLiter(s) (75 mL/Hr) IV Continuous <Continuous>    MEDICATIONS  (PRN):  calcium carbonate    500 mG (Tums) Chewable 1 Tablet(s) Chew three times a day PRN Indigestion  diazepam    Tablet 10 milliGRAM(s) Oral four times a day PRN anxiety  oxyCODONE    IR 30 milliGRAM(s) Oral every 4 hours PRN Severe Pain (7 - 10)      Vital Signs Last 24 Hrs  T(C): 36.8 (21 Feb 2020 13:47), Max: 36.8 (21 Feb 2020 09:43)  T(F): 98.2 (21 Feb 2020 13:47), Max: 98.2 (21 Feb 2020 09:43)  HR: 60 (21 Feb 2020 17:37) (51 - 60)  BP: 84/50 (21 Feb 2020 18:47) (84/50 - 117/71)  BP(mean): --  RR: 18 (21 Feb 2020 13:47) (18 - 18)  SpO2: 95% (21 Feb 2020 13:47) (95% - 95%)    PHYSICAL EXAM:  GENERAL: NAD, well-developed  HEAD:  Atraumatic, Normocephalic  EYES: EOMI, PERRLA, conjunctiva and sclera clear  NECK: Supple, No JVD  CHEST/LUNG: Clear to auscultation bilaterally; No wheeze  HEART: Regular rate and rhythm; No murmurs, rubs, or gallops  ABDOMEN: Soft, Nontender, Nondistended; Bowel sounds present  EXTREMITIES:  2+ Peripheral Pulses, No clubbing, cyanosis, or edema  PSYCH: AAOx3  NEUROLOGY: non-focal  SKIN: No rashes or lesions    LABS:                      RADIOLOGY & ADDITIONAL TESTS:    Imaging Personally Reviewed:    Consultant(s) Notes Reviewed:      Care Discussed with Consultants/Other Providers:

## 2020-02-22 LAB
PA ADP PRP-ACNC: 246 PRU — SIGNIFICANT CHANGE UP (ref 194–417)
PA ADP PRP-ACNC: 257 PRU — SIGNIFICANT CHANGE UP (ref 194–417)
PLATELET RESPONSE ASPIRIN RESULT: 492 ARU — SIGNIFICANT CHANGE UP (ref 350–700)
PLATELET RESPONSE ASPIRIN RESULT: 547 ARU — SIGNIFICANT CHANGE UP (ref 350–700)

## 2020-02-22 RX ORDER — SENNA PLUS 8.6 MG/1
2 TABLET ORAL AT BEDTIME
Refills: 0 | Status: DISCONTINUED | OUTPATIENT
Start: 2020-02-22 | End: 2020-02-25

## 2020-02-22 RX ORDER — POLYETHYLENE GLYCOL 3350 17 G/17G
17 POWDER, FOR SOLUTION ORAL DAILY
Refills: 0 | Status: DISCONTINUED | OUTPATIENT
Start: 2020-02-22 | End: 2020-02-25

## 2020-02-22 RX ADMIN — HEPARIN SODIUM 5000 UNIT(S): 5000 INJECTION INTRAVENOUS; SUBCUTANEOUS at 05:59

## 2020-02-22 RX ADMIN — POLYETHYLENE GLYCOL 3350 17 GRAM(S): 17 POWDER, FOR SOLUTION ORAL at 11:29

## 2020-02-22 RX ADMIN — OXYCODONE HYDROCHLORIDE 30 MILLIGRAM(S): 5 TABLET ORAL at 22:36

## 2020-02-22 RX ADMIN — ATORVASTATIN CALCIUM 40 MILLIGRAM(S): 80 TABLET, FILM COATED ORAL at 21:31

## 2020-02-22 RX ADMIN — OXYCODONE HYDROCHLORIDE 30 MILLIGRAM(S): 5 TABLET ORAL at 21:31

## 2020-02-22 RX ADMIN — HEPARIN SODIUM 5000 UNIT(S): 5000 INJECTION INTRAVENOUS; SUBCUTANEOUS at 17:09

## 2020-02-22 RX ADMIN — Medication 40 MILLIGRAM(S): at 05:59

## 2020-02-22 RX ADMIN — Medication 10 MILLIGRAM(S): at 07:58

## 2020-02-22 RX ADMIN — PANTOPRAZOLE SODIUM 40 MILLIGRAM(S): 20 TABLET, DELAYED RELEASE ORAL at 05:59

## 2020-02-22 RX ADMIN — OXYCODONE HYDROCHLORIDE 10 MILLIGRAM(S): 5 TABLET ORAL at 19:00

## 2020-02-22 RX ADMIN — OXYCODONE HYDROCHLORIDE 10 MILLIGRAM(S): 5 TABLET ORAL at 18:16

## 2020-02-22 RX ADMIN — OXYCODONE HYDROCHLORIDE 30 MILLIGRAM(S): 5 TABLET ORAL at 13:05

## 2020-02-22 RX ADMIN — SENNA PLUS 2 TABLET(S): 8.6 TABLET ORAL at 21:31

## 2020-02-22 RX ADMIN — OXYCODONE HYDROCHLORIDE 30 MILLIGRAM(S): 5 TABLET ORAL at 18:05

## 2020-02-22 RX ADMIN — OXYCODONE HYDROCHLORIDE 30 MILLIGRAM(S): 5 TABLET ORAL at 17:05

## 2020-02-22 RX ADMIN — OXYCODONE HYDROCHLORIDE 30 MILLIGRAM(S): 5 TABLET ORAL at 09:54

## 2020-02-22 RX ADMIN — OXYCODONE HYDROCHLORIDE 10 MILLIGRAM(S): 5 TABLET ORAL at 05:59

## 2020-02-22 RX ADMIN — OXYCODONE HYDROCHLORIDE 10 MILLIGRAM(S): 5 TABLET ORAL at 06:41

## 2020-02-22 RX ADMIN — OXYCODONE HYDROCHLORIDE 30 MILLIGRAM(S): 5 TABLET ORAL at 00:09

## 2020-02-22 RX ADMIN — OXYCODONE HYDROCHLORIDE 30 MILLIGRAM(S): 5 TABLET ORAL at 13:45

## 2020-02-22 RX ADMIN — OXYCODONE HYDROCHLORIDE 30 MILLIGRAM(S): 5 TABLET ORAL at 09:05

## 2020-02-22 NOTE — PROGRESS NOTE ADULT - ASSESSMENT
59 f with    Abnormal hardware Cervical spine  - ID evaluation/ observe off antibiotics  - Neurosurgical follow. For removal next week.  - pain control  - ENT follow. Not cleared for DC    Possible esophageal fistula  - GI follow.  - EGD noted.   - esophagogram with no perforation    AICD (automatic cardioverter/defibrillator) present   - EP check    Arthritis   - pain control    Atrial Fibrillation paroxismal Dx in 08/2009  rate control    Back pain   - pain control    CAD (Coronary Artery Disease)   - continue Rx  - cardiology evaluation   - Hold ASA and Plavix for OR.    Hypercholesteremia   - continue Rx    PAD (peripheral artery disease) r leg 06  -stable    Lung opacities  - Pulmonary evaluation Dr. Gutierrez noted    d/w patient and  at length     Mega Zimmerman MD pager [phone]

## 2020-02-22 NOTE — PROGRESS NOTE ADULT - SUBJECTIVE AND OBJECTIVE BOX
Patient is a 59y old  Female who presents with a chief complaint of abnormal CT C spine (21 Feb 2020 15:33)      SUBJECTIVE / OVERNIGHT EVENTS: No new complaints.  at bedside.  Review of Systems  chest pain no  palpitations no  sob no  nausea no  headache no    MEDICATIONS  (STANDING):  atorvastatin 40 milliGRAM(s) Oral at bedtime  furosemide    Tablet 40 milliGRAM(s) Oral daily  heparin  Injectable 5000 Unit(s) SubCutaneous every 12 hours  losartan 50 milliGRAM(s) Oral two times a day  metoprolol succinate ER 50 milliGRAM(s) Oral two times a day  oxyCODONE  ER Tablet 10 milliGRAM(s) Oral every 12 hours  pantoprazole    Tablet 40 milliGRAM(s) Oral before breakfast  polyethylene glycol 3350 17 Gram(s) Oral daily  senna 2 Tablet(s) Oral at bedtime  sodium chloride 0.9%. 1000 milliLiter(s) (75 mL/Hr) IV Continuous <Continuous>    MEDICATIONS  (PRN):  calcium carbonate    500 mG (Tums) Chewable 1 Tablet(s) Chew three times a day PRN Indigestion  diazepam    Tablet 10 milliGRAM(s) Oral four times a day PRN anxiety  oxyCODONE    IR 30 milliGRAM(s) Oral every 4 hours PRN Severe Pain (7 - 10)      Vital Signs Last 24 Hrs  T(C): 36.7 (22 Feb 2020 16:48), Max: 36.8 (21 Feb 2020 22:12)  T(F): 98 (22 Feb 2020 16:48), Max: 98.3 (21 Feb 2020 22:12)  HR: 69 (22 Feb 2020 16:48) (55 - 69)  BP: 92/58 (22 Feb 2020 16:48) (91/51 - 112/60)  BP(mean): --  RR: 18 (22 Feb 2020 16:48) (16 - 18)  SpO2: 95% (22 Feb 2020 16:48) (94% - 98%)    PHYSICAL EXAM:  GENERAL: NAD, well-developed  HEAD:  Atraumatic, Normocephalic  EYES: EOMI, PERRLA, conjunctiva and sclera clear  NECK: Supple, No JVD  CHEST/LUNG: Clear to auscultation bilaterally; No wheeze  HEART: Regular rate and rhythm; No murmurs, rubs, or gallops  ABDOMEN: Soft, Nontender, Nondistended; Bowel sounds present  EXTREMITIES:  2+ Peripheral Pulses, No clubbing, cyanosis, or edema  PSYCH: AAOx3  NEUROLOGY: non-focal  SKIN: No rashes or lesions    LABS:                      RADIOLOGY & ADDITIONAL TESTS:    Imaging Personally Reviewed:    Consultant(s) Notes Reviewed:      Care Discussed with Consultants/Other Providers:

## 2020-02-23 RX ORDER — OXYCODONE HYDROCHLORIDE 5 MG/1
30 TABLET ORAL EVERY 4 HOURS
Refills: 0 | Status: DISCONTINUED | OUTPATIENT
Start: 2020-02-23 | End: 2020-02-25

## 2020-02-23 RX ORDER — OXYCODONE HYDROCHLORIDE 5 MG/1
10 TABLET ORAL EVERY 12 HOURS
Refills: 0 | Status: DISCONTINUED | OUTPATIENT
Start: 2020-02-24 | End: 2020-02-25

## 2020-02-23 RX ADMIN — OXYCODONE HYDROCHLORIDE 10 MILLIGRAM(S): 5 TABLET ORAL at 07:32

## 2020-02-23 RX ADMIN — OXYCODONE HYDROCHLORIDE 10 MILLIGRAM(S): 5 TABLET ORAL at 05:29

## 2020-02-23 RX ADMIN — PANTOPRAZOLE SODIUM 40 MILLIGRAM(S): 20 TABLET, DELAYED RELEASE ORAL at 05:23

## 2020-02-23 RX ADMIN — Medication 40 MILLIGRAM(S): at 05:24

## 2020-02-23 RX ADMIN — Medication 50 MILLIGRAM(S): at 18:19

## 2020-02-23 RX ADMIN — OXYCODONE HYDROCHLORIDE 30 MILLIGRAM(S): 5 TABLET ORAL at 11:50

## 2020-02-23 RX ADMIN — POLYETHYLENE GLYCOL 3350 17 GRAM(S): 17 POWDER, FOR SOLUTION ORAL at 11:22

## 2020-02-23 RX ADMIN — OXYCODONE HYDROCHLORIDE 30 MILLIGRAM(S): 5 TABLET ORAL at 21:50

## 2020-02-23 RX ADMIN — HEPARIN SODIUM 5000 UNIT(S): 5000 INJECTION INTRAVENOUS; SUBCUTANEOUS at 18:19

## 2020-02-23 RX ADMIN — ATORVASTATIN CALCIUM 40 MILLIGRAM(S): 80 TABLET, FILM COATED ORAL at 21:19

## 2020-02-23 RX ADMIN — OXYCODONE HYDROCHLORIDE 30 MILLIGRAM(S): 5 TABLET ORAL at 16:30

## 2020-02-23 RX ADMIN — OXYCODONE HYDROCHLORIDE 30 MILLIGRAM(S): 5 TABLET ORAL at 11:22

## 2020-02-23 RX ADMIN — OXYCODONE HYDROCHLORIDE 30 MILLIGRAM(S): 5 TABLET ORAL at 21:19

## 2020-02-23 RX ADMIN — SENNA PLUS 2 TABLET(S): 8.6 TABLET ORAL at 21:19

## 2020-02-23 RX ADMIN — Medication 10 MILLIGRAM(S): at 00:08

## 2020-02-23 RX ADMIN — OXYCODONE HYDROCHLORIDE 30 MILLIGRAM(S): 5 TABLET ORAL at 16:02

## 2020-02-23 RX ADMIN — HEPARIN SODIUM 5000 UNIT(S): 5000 INJECTION INTRAVENOUS; SUBCUTANEOUS at 05:23

## 2020-02-23 NOTE — PROGRESS NOTE ADULT - SUBJECTIVE AND OBJECTIVE BOX
Patient seen and examined at bedside.    --Anticoagulation--    T(C): 36.7 (02-23-20 @ 04:57), Max: 36.7 (02-22-20 @ 08:57)  HR: 62 (02-23-20 @ 05:29) (56 - 69)  BP: 98/61 (02-23-20 @ 05:29) (91/51 - 146/75)  RR: 18 (02-23-20 @ 04:57) (16 - 18)  SpO2: 95% (02-23-20 @ 04:57) (94% - 95%)  Wt(kg): --    Exam:   AOx3, FC, PERRL, EOMI, no facial   5/5 throughout, no drift  SILT  no clonus  R neck/shoulder pain

## 2020-02-23 NOTE — PROGRESS NOTE ADULT - SUBJECTIVE AND OBJECTIVE BOX
Cardiovascular Disease Progress Note    Overnight events: No acute events overnight.  awaiting surgery. no new cardiac sx  Otherwise review of systems negative    Objective Findings:  T(C): 36.7 (02-23-20 @ 08:21), Max: 36.7 (02-22-20 @ 16:48)  HR: 61 (02-23-20 @ 08:21) (60 - 69)  BP: 96/58 (02-23-20 @ 08:21) (92/58 - 146/75)  RR: 16 (02-23-20 @ 08:21) (16 - 18)  SpO2: 93% (02-23-20 @ 08:21) (93% - 95%)  Wt(kg): --  Daily     Daily       Physical Exam:  Gen: NAD  HEENT: EOMI  CV: RRR, normal S1 + S2, no m/r/g  Lungs: CTAB  Abd: soft, non-tender  Ext: No edema      Laboratory Data:                    Inpatient Medications:  MEDICATIONS  (STANDING):  atorvastatin 40 milliGRAM(s) Oral at bedtime  furosemide    Tablet 40 milliGRAM(s) Oral daily  heparin  Injectable 5000 Unit(s) SubCutaneous every 12 hours  losartan 50 milliGRAM(s) Oral two times a day  metoprolol succinate ER 50 milliGRAM(s) Oral two times a day  pantoprazole    Tablet 40 milliGRAM(s) Oral before breakfast  polyethylene glycol 3350 17 Gram(s) Oral daily  senna 2 Tablet(s) Oral at bedtime  sodium chloride 0.9%. 1000 milliLiter(s) (75 mL/Hr) IV Continuous <Continuous>      Assessment:  -CAD s/p 4vCABG and PCI  -ICM s/p AICD   -HLD  -pAFib  -chronic back pain s/p T10-L5 posterior spinal fusion for lumbar disc disease with radiculopathy   -? C spine with gas, implying erosion into esophagus or gas forming bacteria around the screw    Recs:  -c/w metop and losartan for gdmt for lv dysfunction and anti-anginal/anti-arrhythmic effect.  -obtain preop 12 lead ecg  -call EP for ICD interrogation prior to OR  -c/w lasix 40mg. appears euvolemic  -management of APA per surgery. resume as tolerates  -hx of pAF not on ac, remains on high dose asa and plavix  -multispecialty services appreciated - neuro, gi and ent  -s/p neg esophagram  -s/p ct chest. pulm consult appreciated  -s/p egd neg for perf  -care per medicine      Over 25 minutes spent on total encounter; more than 50% of the visit was spent counseling and/or coordinating care by the attending physician.      Ministerio Dupont MD   Cardiovascular Disease  [phone]

## 2020-02-23 NOTE — PROGRESS NOTE ADULT - ASSESSMENT
59 f with    Abnormal hardware Cervical spine  - ID evaluation/ observe off antibiotics  - Neurosurgical follow. For removal next week.  - pain control  - ENT follow.     Possible esophageal fistula  - GI follow.  - EGD noted.   - esophagogram with no perforation    AICD (automatic cardioverter/defibrillator) present   - EP check    Arthritis   - pain control    Atrial Fibrillation paroxismal Dx in 08/2009  rate control    Back pain   - pain control    CAD (Coronary Artery Disease)   - continue Rx  - cardiology evaluation   - Hold ASA and Plavix for OR.    Hypercholesteremia   - continue Rx    PAD (peripheral artery disease) r leg 06  -stable    Lung opacities  - Pulmonary evaluation Dr. Gutierrez noted    d/w patient at length     Mega Zimmerman MD pager [phone]

## 2020-02-23 NOTE — PROGRESS NOTE ADULT - SUBJECTIVE AND OBJECTIVE BOX
Patient is a 59y old  Female who presents with a chief complaint of abnormal CT C spine (23 Feb 2020 09:49)      SUBJECTIVE / OVERNIGHT EVENTS: No new complaints.   Review of Systems  chest pain no  palpitations no  sob no  nausea no  headache no    MEDICATIONS  (STANDING):  atorvastatin 40 milliGRAM(s) Oral at bedtime  furosemide    Tablet 40 milliGRAM(s) Oral daily  heparin  Injectable 5000 Unit(s) SubCutaneous every 12 hours  losartan 50 milliGRAM(s) Oral two times a day  metoprolol succinate ER 50 milliGRAM(s) Oral two times a day  pantoprazole    Tablet 40 milliGRAM(s) Oral before breakfast  polyethylene glycol 3350 17 Gram(s) Oral daily  senna 2 Tablet(s) Oral at bedtime  sodium chloride 0.9%. 1000 milliLiter(s) (75 mL/Hr) IV Continuous <Continuous>    MEDICATIONS  (PRN):  calcium carbonate    500 mG (Tums) Chewable 1 Tablet(s) Chew three times a day PRN Indigestion  diazepam    Tablet 10 milliGRAM(s) Oral four times a day PRN anxiety  oxyCODONE    IR 30 milliGRAM(s) Oral every 4 hours PRN Severe Pain (7 - 10)      Vital Signs Last 24 Hrs  T(C): 36.6 (23 Feb 2020 16:06), Max: 36.7 (23 Feb 2020 04:57)  T(F): 97.9 (23 Feb 2020 16:06), Max: 98.1 (23 Feb 2020 04:57)  HR: 95 (23 Feb 2020 16:06) (60 - 95)  BP: 137/78 (23 Feb 2020 16:06) (96/58 - 146/75)  BP(mean): --  RR: 18 (23 Feb 2020 16:06) (16 - 18)  SpO2: 95% (23 Feb 2020 16:06) (93% - 95%)    PHYSICAL EXAM:  GENERAL: NAD, well-developed  HEAD:  Atraumatic, Normocephalic  EYES: EOMI, PERRLA, conjunctiva and sclera clear  NECK: Supple, No JVD  CHEST/LUNG: Clear to auscultation bilaterally; No wheeze  HEART: Regular rate and rhythm; No murmurs, rubs, or gallops  ABDOMEN: Soft, Nontender, Nondistended; Bowel sounds present  EXTREMITIES:  2+ Peripheral Pulses, No clubbing, cyanosis, or edema  PSYCH: AAOx3  NEUROLOGY: non-focal  SKIN: No rashes or lesions    LABS:                      RADIOLOGY & ADDITIONAL TESTS:    Imaging Personally Reviewed:    Consultant(s) Notes Reviewed:      Care Discussed with Consultants/Other Providers:

## 2020-02-23 NOTE — PROGRESS NOTE ADULT - ASSESSMENT
Paula, Lela  59F PMHx CAD s/p 4vCABG, MI, AICD, stents x3 on ASA plavix, HLD, afib, back pain s/p C5-C6 ACDF (2002) and T10-P fusion, sent in my PMD after ENT scoped and ordered CT c-spine which shows hardware and fusion failure at C5 with air around hardware, adjacent to esophagus. She has had intermittent fevers for 4 months but otherwise no complaints. CT L shows fused T10-P construct. Exam: neurointact, denies pain.  - Plan for OR Thursday with ENT_  -Hold AC/AP  -pre op clearance

## 2020-02-24 ENCOUNTER — TRANSCRIPTION ENCOUNTER (OUTPATIENT)
Age: 60
End: 2020-02-24

## 2020-02-24 LAB
BLD GP AB SCN SERPL QL: NEGATIVE — SIGNIFICANT CHANGE UP
RH IG SCN BLD-IMP: POSITIVE — SIGNIFICANT CHANGE UP

## 2020-02-24 RX ORDER — METOPROLOL TARTRATE 50 MG
50 TABLET ORAL DAILY
Refills: 0 | Status: DISCONTINUED | OUTPATIENT
Start: 2020-02-24 | End: 2020-02-25

## 2020-02-24 RX ORDER — KETOROLAC TROMETHAMINE 30 MG/ML
15 SYRINGE (ML) INJECTION ONCE
Refills: 0 | Status: DISCONTINUED | OUTPATIENT
Start: 2020-02-24 | End: 2020-02-24

## 2020-02-24 RX ORDER — METOPROLOL TARTRATE 50 MG
50 TABLET ORAL DAILY
Refills: 0 | Status: DISCONTINUED | OUTPATIENT
Start: 2020-02-24 | End: 2020-02-24

## 2020-02-24 RX ADMIN — OXYCODONE HYDROCHLORIDE 10 MILLIGRAM(S): 5 TABLET ORAL at 05:28

## 2020-02-24 RX ADMIN — Medication 50 MILLIGRAM(S): at 05:28

## 2020-02-24 RX ADMIN — ATORVASTATIN CALCIUM 40 MILLIGRAM(S): 80 TABLET, FILM COATED ORAL at 20:12

## 2020-02-24 RX ADMIN — OXYCODONE HYDROCHLORIDE 10 MILLIGRAM(S): 5 TABLET ORAL at 18:00

## 2020-02-24 RX ADMIN — HEPARIN SODIUM 5000 UNIT(S): 5000 INJECTION INTRAVENOUS; SUBCUTANEOUS at 17:09

## 2020-02-24 RX ADMIN — SODIUM CHLORIDE 75 MILLILITER(S): 9 INJECTION INTRAMUSCULAR; INTRAVENOUS; SUBCUTANEOUS at 22:29

## 2020-02-24 RX ADMIN — Medication 15 MILLIGRAM(S): at 14:45

## 2020-02-24 RX ADMIN — PANTOPRAZOLE SODIUM 40 MILLIGRAM(S): 20 TABLET, DELAYED RELEASE ORAL at 05:28

## 2020-02-24 RX ADMIN — OXYCODONE HYDROCHLORIDE 30 MILLIGRAM(S): 5 TABLET ORAL at 20:43

## 2020-02-24 RX ADMIN — OXYCODONE HYDROCHLORIDE 10 MILLIGRAM(S): 5 TABLET ORAL at 06:00

## 2020-02-24 RX ADMIN — Medication 15 MILLIGRAM(S): at 14:34

## 2020-02-24 RX ADMIN — POLYETHYLENE GLYCOL 3350 17 GRAM(S): 17 POWDER, FOR SOLUTION ORAL at 11:35

## 2020-02-24 RX ADMIN — OXYCODONE HYDROCHLORIDE 10 MILLIGRAM(S): 5 TABLET ORAL at 17:09

## 2020-02-24 RX ADMIN — LOSARTAN POTASSIUM 50 MILLIGRAM(S): 100 TABLET, FILM COATED ORAL at 05:28

## 2020-02-24 RX ADMIN — HEPARIN SODIUM 5000 UNIT(S): 5000 INJECTION INTRAVENOUS; SUBCUTANEOUS at 05:28

## 2020-02-24 RX ADMIN — LOSARTAN POTASSIUM 50 MILLIGRAM(S): 100 TABLET, FILM COATED ORAL at 17:09

## 2020-02-24 RX ADMIN — Medication 40 MILLIGRAM(S): at 05:28

## 2020-02-24 RX ADMIN — OXYCODONE HYDROCHLORIDE 30 MILLIGRAM(S): 5 TABLET ORAL at 20:13

## 2020-02-24 NOTE — PROGRESS NOTE ADULT - SUBJECTIVE AND OBJECTIVE BOX
Patient seen and examined at bedside.    --Anticoagulation--    T(C): 36.3 (02-24-20 @ 05:20), Max: 37 (02-24-20 @ 00:48)  HR: 88 (02-24-20 @ 05:20) (61 - 98)  BP: 107/65 (02-24-20 @ 05:20) (96/58 - 137/78)  RR: 18 (02-24-20 @ 05:20) (16 - 18)  SpO2: 98% (02-24-20 @ 05:20) (93% - 98%)  Wt(kg): --    Exam:  AOx3, FC, PERRL, EOMI, no facial   5/5 throughout, no drift  SILT  no clonus  R neck/shoulder pain

## 2020-02-24 NOTE — PROGRESS NOTE ADULT - ASSESSMENT
Paula, Lela  59F PMHx CAD s/p 4vCABG, MI, AICD, stents x3 on ASA plavix, HLD, afib, back pain s/p C5-C6 ACDF (2002) and T10-P fusion, sent in my PMD after ENT scoped and ordered CT c-spine which shows hardware and fusion failure at C5 with air around hardware, adjacent to esophagus. She has had intermittent fevers for 4 months but otherwise no complaints. CT L shows fused T10-P construct. Exam: neurointact, denies pain.  - Plan for OR Thursday with ENT_  - Hold AC/AP  - pre op clearance

## 2020-02-24 NOTE — CHART NOTE - NSCHARTNOTEFT_GEN_A_CORE
Preop Dx: loose c5-6 hardware w surrounding free air  Surgeon: Dr Meek Warren   Procedure: Neck Exploration and drainage/Removal of hardware at C5-C6, possible Iliac crest bone graft     Vital Signs Last 24 Hrs  T(C): 36.7 (24 Feb 2020 16:11), Max: 37 (24 Feb 2020 00:48)  T(F): 98 (24 Feb 2020 16:11), Max: 98.6 (24 Feb 2020 00:48)  HR: 60 (24 Feb 2020 16:11) (52 - 98)  BP: 115/68 (24 Feb 2020 16:11) (96/48 - 115/70)  BP(mean): --  RR: 18 (24 Feb 2020 16:11) (18 - 18)  SpO2: 96% (24 Feb 2020 16:11) (94% - 98%)            Daily     Daily     EKG:    Ventricular Rate 52 BPM    Atrial Rate 52 BPM    P-R Interval 150 ms    QRS Duration 106 ms    Q-T Interval 458 ms    QTC Calculation(Bezet) 425 ms    P Axis 20 degrees    R Axis 50 degrees    T Axis -22 degrees    Diagnosis Line SINUS BRADYCARDIA  SEPTAL INFARCT , AGE UNDETERMINED  INFERIOR INFARCT , AGE UNDETERMINED      Confirmed by ELINA GAVIRIA MD [id number]) on 2/16/2020 3:16:37 PM    CXR:       EXAM:  XR CHEST AP OR PA 1V                            PROCEDURE DATE:  02/13/2020            INTERPRETATION:  Chest one view    HISTORY: Sepsis    COMPARISON STUDY: 10/23/2015    Frontal expiratory view of the chest shows the heart to be normal in size. The lungs scattered small infiltrates of the right upper lobe and there is no evidence of pneumothorax nor pleural effusion. Sternal wires, left defibrillator, cervical spine and lumbar spine hardware are again noted.    IMPRESSION:  Developing or resolving right upper lobe infiltrate.    Thank you for the courtesy of this referral.          Type and Screen: needs to be ordered     Plan:  - OR 02/24/2020 for Neck Exploration and drainage/Removal of hardware at C5-C6, possible Iliac crest bone graft   with Dr Meek Warren   - NPO after midnight except meds  - IVF while NPO  - Consent done  - Medical/Cardiac clearance for OR

## 2020-02-24 NOTE — PROGRESS NOTE ADULT - ASSESSMENT
59 f with    Abnormal hardware Cervical spine  - ID evaluation/ observe off antibiotics  - Neurosurgical follow. For removal.  - pain control  - ENT follow.     Possible esophageal fistula  - GI follow.  - EGD noted.   - esophagogram with no perforation    AICD (automatic cardioverter/defibrillator) present   - EP check    Arthritis   - pain control    Atrial Fibrillation paroxismal Dx in 08/2009  rate control    Back pain   - pain control    CAD (Coronary Artery Disease)   - continue Rx  - cardiology evaluation   - Hold ASA and Plavix for OR.    Hypercholesteremia   - continue Rx    PAD (peripheral artery disease) r leg 06  -stable    Lung opacities  - Pulmonary evaluation Dr. Gutierrez noted    No acute medical condition identified to postpone planned surgical intervention.     d/w patient at length     Mega Zimmerman MD pager [phone]

## 2020-02-24 NOTE — PROGRESS NOTE ADULT - SUBJECTIVE AND OBJECTIVE BOX
Cardiovascular Disease Progress Note    Overnight events: No acute events overnight.  no new cardiac sx  Otherwise review of systems negative    Objective Findings:  T(C): 36.3 (02-24-20 @ 05:20), Max: 37 (02-24-20 @ 00:48)  HR: 88 (02-24-20 @ 05:20) (61 - 98)  BP: 107/65 (02-24-20 @ 05:20) (96/58 - 137/78)  RR: 18 (02-24-20 @ 05:20) (16 - 18)  SpO2: 98% (02-24-20 @ 05:20) (93% - 98%)  Wt(kg): --  Daily     Daily       Physical Exam:  Gen: NAD  HEENT: EOMI  CV: RRR, normal S1 + S2, no m/r/g  Lungs: CTAB  Abd: soft, non-tender  Ext: No edema    Telemetry:    Laboratory Data:                    Inpatient Medications:  MEDICATIONS  (STANDING):  atorvastatin 40 milliGRAM(s) Oral at bedtime  furosemide    Tablet 40 milliGRAM(s) Oral daily  heparin  Injectable 5000 Unit(s) SubCutaneous every 12 hours  losartan 50 milliGRAM(s) Oral two times a day  metoprolol succinate ER 50 milliGRAM(s) Oral two times a day  oxyCODONE  ER Tablet 10 milliGRAM(s) Oral every 12 hours  pantoprazole    Tablet 40 milliGRAM(s) Oral before breakfast  polyethylene glycol 3350 17 Gram(s) Oral daily  senna 2 Tablet(s) Oral at bedtime  sodium chloride 0.9%. 1000 milliLiter(s) (75 mL/Hr) IV Continuous <Continuous>      Assessment:  -CAD s/p 4vCABG and PCI  -ICM s/p AICD   -HLD  -pAFib  -chronic back pain s/p T10-L5 posterior spinal fusion for lumbar disc disease with radiculopathy   -? C spine with gas, implying erosion into esophagus or gas forming bacteria around the screw    Recs:  -c/w metop and losartan for gdmt for lv dysfunction and anti-anginal/anti-arrhythmic effect.  -obtain preop 12 lead ecg  -call EP for ICD interrogation prior to OR  -c/w lasix 40mg. appears euvolemic  -management of APA per surgery. resume as tolerates  -hx of pAF not on ac, remains on high dose asa and plavix  -multispecialty services appreciated - neuro, gi and ent  -s/p neg esophagram  -s/p ct chest. pulm consult appreciated  -s/p egd neg for perf        Over 25 minutes spent on total encounter; more than 50% of the visit was spent counseling and/or coordinating care by the attending physician.      Ministerio Dupont MD   Cardiovascular Disease  [phone]

## 2020-02-24 NOTE — PROGRESS NOTE ADULT - SUBJECTIVE AND OBJECTIVE BOX
ENT ISSUE/POD: loose C spine hardware     HPI: 58 yo female w hoarseness found to have loose cspine hardware, negative esophagram and EGD. No new complaints from pt has been tolerating regular diet. Plavix has been held since 2/21        PAST MEDICAL & SURGICAL HISTORY:  AICD (automatic cardioverter/defibrillator) present  Pseudoarthrosis of lumbar spine  Stented coronary artery: x 3 ESTEVAN  Degenerative disc disease, lumbar  PAD (peripheral artery disease): r leg 06  Hypercholesteremia  Reflux  Back pain  MI (myocardial infarction)  Arthritis  CAD (Coronary Artery Disease): CARDIAC CATH PTCA/[place] x 3 to SVG to RCA 08/2009  Atrial Fibrillation: paroxismal Dx in 08/2009  Elective surgery: Left leg &quot;clean out&quot; secondary PAD  H/O cervical spine surgery  History of back surgery: T10 - L4 posterior fusion  Elective surgery: Right leg art bypass 2006  S/P lumbar spine operation: laminectomy  Elective surgery: AICD 2006  S/P coronary artery stent placement: x 3  - 2010  S/P coronary artery bypass graft x 4: 2006    Allergies    No Known Allergies    Intolerances      MEDICATIONS  (STANDING):  atorvastatin 40 milliGRAM(s) Oral at bedtime  furosemide    Tablet 40 milliGRAM(s) Oral daily  heparin  Injectable 5000 Unit(s) SubCutaneous every 12 hours  losartan 50 milliGRAM(s) Oral two times a day  metoprolol succinate ER 50 milliGRAM(s) Oral two times a day  oxyCODONE  ER Tablet 10 milliGRAM(s) Oral every 12 hours  pantoprazole    Tablet 40 milliGRAM(s) Oral before breakfast  polyethylene glycol 3350 17 Gram(s) Oral daily  senna 2 Tablet(s) Oral at bedtime  sodium chloride 0.9%. 1000 milliLiter(s) (75 mL/Hr) IV Continuous <Continuous>    MEDICATIONS  (PRN):  calcium carbonate    500 mG (Tums) Chewable 1 Tablet(s) Chew three times a day PRN Indigestion  diazepam    Tablet 10 milliGRAM(s) Oral four times a day PRN anxiety  oxyCODONE    IR 30 milliGRAM(s) Oral every 4 hours PRN Severe Pain (7 - 10)      ROS:   ENT: all negative except as noted in HPI   Pulm: denies SOB, cough, hemoptysis  Neuro: denies numbness/tingling, loss of sensation  Endo: denies heat/cold intolerance, excessive sweating      Vital Signs Last 24 Hrs  T(C): 36.6 (24 Feb 2020 09:12), Max: 37 (24 Feb 2020 00:48)  T(F): 97.9 (24 Feb 2020 09:12), Max: 98.6 (24 Feb 2020 00:48)  HR: 60 (24 Feb 2020 09:12) (60 - 98)  BP: 99/63 (24 Feb 2020 09:12) (99/63 - 137/78)  BP(mean): --  RR: 18 (24 Feb 2020 09:12) (17 - 18)  SpO2: 95% (24 Feb 2020 09:12) (94% - 98%)              PHYSICAL EXAM:  Gen: NAD  Skin: No rashes, bruises, or lesions  Head: Normocephalic, Atraumatic  Face: no edema, erythema, or fluctuance. Parotid glands soft without mass  Eyes: no scleral injection  Nose: Nares bilaterally patent, no discharge  Mouth: No Stridor / Drooling / Trismus.  Mucosa moist, tongue/uvula midline, oropharynx clear  Neck: Flat, supple, no lymphadenopathy, trachea midline, no masses  Lymphatic: No lymphadenopathy  Resp: breathing easily, no stridor  Neuro: facial nerve intact, no facial droop

## 2020-02-24 NOTE — PROGRESS NOTE ADULT - SUBJECTIVE AND OBJECTIVE BOX
Patient is a 59y old  Female who presents with a chief complaint of abnormal CT C spine (24 Feb 2020 10:39)      SUBJECTIVE / OVERNIGHT EVENTS: Comfortable without new complaints.   Review of Systems  chest pain no  palpitations no  sob no  nausea no  headache no    MEDICATIONS  (STANDING):  atorvastatin 40 milliGRAM(s) Oral at bedtime  furosemide    Tablet 40 milliGRAM(s) Oral daily  heparin  Injectable 5000 Unit(s) SubCutaneous every 12 hours  losartan 50 milliGRAM(s) Oral two times a day  metoprolol succinate ER 50 milliGRAM(s) Oral daily  oxyCODONE  ER Tablet 10 milliGRAM(s) Oral every 12 hours  pantoprazole    Tablet 40 milliGRAM(s) Oral before breakfast  polyethylene glycol 3350 17 Gram(s) Oral daily  senna 2 Tablet(s) Oral at bedtime  sodium chloride 0.9%. 1000 milliLiter(s) (75 mL/Hr) IV Continuous <Continuous>    MEDICATIONS  (PRN):  calcium carbonate    500 mG (Tums) Chewable 1 Tablet(s) Chew three times a day PRN Indigestion  diazepam    Tablet 10 milliGRAM(s) Oral four times a day PRN anxiety  oxyCODONE    IR 30 milliGRAM(s) Oral every 4 hours PRN Severe Pain (7 - 10)      Vital Signs Last 24 Hrs  T(C): 36.6 (24 Feb 2020 19:45), Max: 37 (24 Feb 2020 00:48)  T(F): 97.9 (24 Feb 2020 19:45), Max: 98.6 (24 Feb 2020 00:48)  HR: 56 (24 Feb 2020 20:15) (49 - 98)  BP: 99/62 (24 Feb 2020 19:45) (96/48 - 115/70)  BP(mean): --  RR: 18 (24 Feb 2020 19:45) (18 - 18)  SpO2: 96% (24 Feb 2020 19:45) (95% - 98%)    PHYSICAL EXAM:  GENERAL: NAD, well-developed  HEAD:  Atraumatic, Normocephalic  EYES: EOMI, PERRLA, conjunctiva and sclera clear  NECK: Supple, No JVD  CHEST/LUNG: Clear to auscultation bilaterally; No wheeze  HEART: Regular rate and rhythm; No murmurs, rubs, or gallops  ABDOMEN: Soft, Nontender, Nondistended; Bowel sounds present  EXTREMITIES:  2+ Peripheral Pulses, No clubbing, cyanosis, or edema  PSYCH: AAOx3  NEUROLOGY: non-focal  SKIN: No rashes or lesions    LABS:                      RADIOLOGY & ADDITIONAL TESTS:    Imaging Personally Reviewed:    Consultant(s) Notes Reviewed:      Care Discussed with Consultants/Other Providers:

## 2020-02-25 ENCOUNTER — RESULT REVIEW (OUTPATIENT)
Age: 60
End: 2020-02-25

## 2020-02-25 LAB
ALBUMIN SERPL ELPH-MCNC: 3.9 G/DL — SIGNIFICANT CHANGE UP (ref 3.3–5)
ALP SERPL-CCNC: 84 U/L — SIGNIFICANT CHANGE UP (ref 40–120)
ALT FLD-CCNC: 8 U/L — LOW (ref 10–45)
ANION GAP SERPL CALC-SCNC: 10 MMOL/L — SIGNIFICANT CHANGE UP (ref 5–17)
APTT BLD: 34.4 SEC — SIGNIFICANT CHANGE UP (ref 27.5–36.3)
AST SERPL-CCNC: 13 U/L — SIGNIFICANT CHANGE UP (ref 10–40)
BILIRUB SERPL-MCNC: 0.4 MG/DL — SIGNIFICANT CHANGE UP (ref 0.2–1.2)
BUN SERPL-MCNC: 23 MG/DL — SIGNIFICANT CHANGE UP (ref 7–23)
CALCIUM SERPL-MCNC: 9.6 MG/DL — SIGNIFICANT CHANGE UP (ref 8.4–10.5)
CHLORIDE SERPL-SCNC: 99 MMOL/L — SIGNIFICANT CHANGE UP (ref 96–108)
CO2 SERPL-SCNC: 30 MMOL/L — SIGNIFICANT CHANGE UP (ref 22–31)
CREAT SERPL-MCNC: 1.21 MG/DL — SIGNIFICANT CHANGE UP (ref 0.5–1.3)
GLUCOSE SERPL-MCNC: 99 MG/DL — SIGNIFICANT CHANGE UP (ref 70–99)
HCT VFR BLD CALC: 30.9 % — LOW (ref 34.5–45)
HGB BLD-MCNC: 10.2 G/DL — LOW (ref 11.5–15.5)
INR BLD: 1.11 RATIO — SIGNIFICANT CHANGE UP (ref 0.88–1.16)
MCHC RBC-ENTMCNC: 33 GM/DL — SIGNIFICANT CHANGE UP (ref 32–36)
MCHC RBC-ENTMCNC: 34 PG — SIGNIFICANT CHANGE UP (ref 27–34)
MCV RBC AUTO: 103 FL — HIGH (ref 80–100)
NRBC # BLD: 0 /100 WBCS — SIGNIFICANT CHANGE UP (ref 0–0)
PLATELET # BLD AUTO: 276 K/UL — SIGNIFICANT CHANGE UP (ref 150–400)
POTASSIUM SERPL-MCNC: 4.4 MMOL/L — SIGNIFICANT CHANGE UP (ref 3.5–5.3)
POTASSIUM SERPL-SCNC: 4.4 MMOL/L — SIGNIFICANT CHANGE UP (ref 3.5–5.3)
PROT SERPL-MCNC: 7.2 G/DL — SIGNIFICANT CHANGE UP (ref 6–8.3)
PROTHROM AB SERPL-ACNC: 12.7 SEC — SIGNIFICANT CHANGE UP (ref 10–12.9)
RBC # BLD: 3 M/UL — LOW (ref 3.8–5.2)
RBC # FLD: 12.7 % — SIGNIFICANT CHANGE UP (ref 10.3–14.5)
SODIUM SERPL-SCNC: 139 MMOL/L — SIGNIFICANT CHANGE UP (ref 135–145)
WBC # BLD: 6.21 K/UL — SIGNIFICANT CHANGE UP (ref 3.8–10.5)
WBC # FLD AUTO: 6.21 K/UL — SIGNIFICANT CHANGE UP (ref 3.8–10.5)

## 2020-02-25 PROCEDURE — 71045 X-RAY EXAM CHEST 1 VIEW: CPT | Mod: 26

## 2020-02-25 PROCEDURE — 88300 SURGICAL PATH GROSS: CPT | Mod: 26

## 2020-02-25 PROCEDURE — 22855 REMOVAL ANTERIOR INSTRMJ: CPT

## 2020-02-25 RX ORDER — SODIUM CHLORIDE 9 MG/ML
1000 INJECTION, SOLUTION INTRAVENOUS
Refills: 0 | Status: DISCONTINUED | OUTPATIENT
Start: 2020-02-25 | End: 2020-02-28

## 2020-02-25 RX ORDER — PANTOPRAZOLE SODIUM 20 MG/1
1 TABLET, DELAYED RELEASE ORAL
Qty: 0 | Refills: 0 | DISCHARGE

## 2020-02-25 RX ORDER — FUROSEMIDE 40 MG
1 TABLET ORAL
Qty: 0 | Refills: 0 | DISCHARGE

## 2020-02-25 RX ORDER — ONDANSETRON 8 MG/1
4 TABLET, FILM COATED ORAL EVERY 6 HOURS
Refills: 0 | Status: DISCONTINUED | OUTPATIENT
Start: 2020-02-25 | End: 2020-02-28

## 2020-02-25 RX ORDER — LOSARTAN POTASSIUM 100 MG/1
1 TABLET, FILM COATED ORAL
Qty: 0 | Refills: 0 | DISCHARGE

## 2020-02-25 RX ORDER — ONDANSETRON 8 MG/1
4 TABLET, FILM COATED ORAL ONCE
Refills: 0 | Status: DISCONTINUED | OUTPATIENT
Start: 2020-02-25 | End: 2020-02-25

## 2020-02-25 RX ORDER — HYDROMORPHONE HYDROCHLORIDE 2 MG/ML
0.5 INJECTION INTRAMUSCULAR; INTRAVENOUS; SUBCUTANEOUS
Refills: 0 | Status: DISCONTINUED | OUTPATIENT
Start: 2020-02-25 | End: 2020-02-25

## 2020-02-25 RX ORDER — OXYCODONE HYDROCHLORIDE 5 MG/1
10 TABLET ORAL ONCE
Refills: 0 | Status: DISCONTINUED | OUTPATIENT
Start: 2020-02-25 | End: 2020-02-25

## 2020-02-25 RX ORDER — ATORVASTATIN CALCIUM 80 MG/1
1 TABLET, FILM COATED ORAL
Qty: 0 | Refills: 0 | DISCHARGE

## 2020-02-25 RX ORDER — HYDROMORPHONE HYDROCHLORIDE 2 MG/ML
0.5 INJECTION INTRAMUSCULAR; INTRAVENOUS; SUBCUTANEOUS
Refills: 0 | Status: DISCONTINUED | OUTPATIENT
Start: 2020-02-25 | End: 2020-02-28

## 2020-02-25 RX ORDER — NALOXONE HYDROCHLORIDE 4 MG/.1ML
0.1 SPRAY NASAL
Refills: 0 | Status: DISCONTINUED | OUTPATIENT
Start: 2020-02-25 | End: 2020-02-28

## 2020-02-25 RX ORDER — METOPROLOL TARTRATE 50 MG
1 TABLET ORAL
Qty: 0 | Refills: 0 | DISCHARGE

## 2020-02-25 RX ORDER — EZETIMIBE 10 MG/1
1 TABLET ORAL
Qty: 0 | Refills: 0 | DISCHARGE

## 2020-02-25 RX ORDER — ASPIRIN/CALCIUM CARB/MAGNESIUM 324 MG
1 TABLET ORAL
Qty: 0 | Refills: 0 | DISCHARGE

## 2020-02-25 RX ORDER — ACETAMINOPHEN 500 MG
1000 TABLET ORAL EVERY 6 HOURS
Refills: 0 | Status: COMPLETED | OUTPATIENT
Start: 2020-02-25 | End: 2020-02-25

## 2020-02-25 RX ORDER — HYDROMORPHONE HYDROCHLORIDE 2 MG/ML
30 INJECTION INTRAMUSCULAR; INTRAVENOUS; SUBCUTANEOUS
Refills: 0 | Status: DISCONTINUED | OUTPATIENT
Start: 2020-02-25 | End: 2020-02-28

## 2020-02-25 RX ADMIN — OXYCODONE HYDROCHLORIDE 10 MILLIGRAM(S): 5 TABLET ORAL at 06:00

## 2020-02-25 RX ADMIN — Medication 50 MILLIGRAM(S): at 12:22

## 2020-02-25 RX ADMIN — OXYCODONE HYDROCHLORIDE 30 MILLIGRAM(S): 5 TABLET ORAL at 01:00

## 2020-02-25 RX ADMIN — OXYCODONE HYDROCHLORIDE 30 MILLIGRAM(S): 5 TABLET ORAL at 07:43

## 2020-02-25 RX ADMIN — OXYCODONE HYDROCHLORIDE 30 MILLIGRAM(S): 5 TABLET ORAL at 16:02

## 2020-02-25 RX ADMIN — LOSARTAN POTASSIUM 50 MILLIGRAM(S): 100 TABLET, FILM COATED ORAL at 07:47

## 2020-02-25 RX ADMIN — HEPARIN SODIUM 5000 UNIT(S): 5000 INJECTION INTRAVENOUS; SUBCUTANEOUS at 05:26

## 2020-02-25 RX ADMIN — OXYCODONE HYDROCHLORIDE 10 MILLIGRAM(S): 5 TABLET ORAL at 05:25

## 2020-02-25 RX ADMIN — Medication 10 MILLIGRAM(S): at 01:38

## 2020-02-25 RX ADMIN — Medication 40 MILLIGRAM(S): at 05:24

## 2020-02-25 RX ADMIN — PANTOPRAZOLE SODIUM 40 MILLIGRAM(S): 20 TABLET, DELAYED RELEASE ORAL at 05:25

## 2020-02-25 RX ADMIN — HYDROMORPHONE HYDROCHLORIDE 30 MILLILITER(S): 2 INJECTION INTRAMUSCULAR; INTRAVENOUS; SUBCUTANEOUS at 21:05

## 2020-02-25 RX ADMIN — OXYCODONE HYDROCHLORIDE 30 MILLIGRAM(S): 5 TABLET ORAL at 00:25

## 2020-02-25 RX ADMIN — HYDROMORPHONE HYDROCHLORIDE 30 MILLILITER(S): 2 INJECTION INTRAMUSCULAR; INTRAVENOUS; SUBCUTANEOUS at 22:16

## 2020-02-25 RX ADMIN — Medication 400 MILLIGRAM(S): at 23:52

## 2020-02-25 RX ADMIN — Medication 10 MILLIGRAM(S): at 13:31

## 2020-02-25 RX ADMIN — HYDROMORPHONE HYDROCHLORIDE 30 MILLILITER(S): 2 INJECTION INTRAMUSCULAR; INTRAVENOUS; SUBCUTANEOUS at 21:32

## 2020-02-25 RX ADMIN — OXYCODONE HYDROCHLORIDE 30 MILLIGRAM(S): 5 TABLET ORAL at 08:15

## 2020-02-25 NOTE — CHART NOTE - NSCHARTNOTEFT_GEN_A_CORE
Received from RN regarding giving Pt's meds while NPO prior to surgery.  Spoke to ENT; OK to give am meds with small sip of water.  OK to give am dose of heparin.  RN made aware    Geraldine Andrade ANP ext [phone]

## 2020-02-25 NOTE — PRE-ANESTHESIA EVALUATION ADULT - NSANTHPMHFT_GEN_ALL_CORE
-CAD s/p 4vCABG and PCI  -ICM s/p AICD   -HLD  -pAFib  -chronic back pain s/p T10-L5 posterior spinal fusion for lumbar disc disease with radiculopathy   -? C spine with gas, implying erosion into esophagus or gas forming bacteria around the screw    Per patient, recent normal stress test

## 2020-02-25 NOTE — PROGRESS NOTE ADULT - SUBJECTIVE AND OBJECTIVE BOX
Cardiovascular Disease Progress Note    Overnight events: No acute events overnight.  for OR thurs. no new cardiac sx  Otherwise review of systems negative    Objective Findings:  T(C): 36.8 (02-25-20 @ 04:41), Max: 36.8 (02-25-20 @ 04:41)  HR: 54 (02-25-20 @ 06:51) (49 - 60)  BP: 103/50 (02-25-20 @ 06:43) (96/48 - 115/68)  RR: 18 (02-25-20 @ 04:41) (18 - 18)  SpO2: 94% (02-25-20 @ 04:41) (94% - 97%)  Wt(kg): --  Daily     Daily       Physical Exam:  Gen: NAD  HEENT: EOMI  CV: RRR, normal S1 + S2, no m/r/g  Lungs: CTAB  Abd: soft, non-tender  Ext: No edema    Telemetry:    Laboratory Data:                    Inpatient Medications:  MEDICATIONS  (STANDING):  atorvastatin 40 milliGRAM(s) Oral at bedtime  furosemide    Tablet 40 milliGRAM(s) Oral daily  heparin  Injectable 5000 Unit(s) SubCutaneous every 12 hours  losartan 50 milliGRAM(s) Oral two times a day  metoprolol succinate ER 50 milliGRAM(s) Oral daily  oxyCODONE  ER Tablet 10 milliGRAM(s) Oral every 12 hours  pantoprazole    Tablet 40 milliGRAM(s) Oral before breakfast  polyethylene glycol 3350 17 Gram(s) Oral daily  senna 2 Tablet(s) Oral at bedtime  sodium chloride 0.9%. 1000 milliLiter(s) (75 mL/Hr) IV Continuous <Continuous>      Assessment:  -CAD s/p 4vCABG and PCI  -ICM s/p AICD   -HLD  -pAFib  -chronic back pain s/p T10-L5 posterior spinal fusion for lumbar disc disease with radiculopathy   -? C spine with gas, implying erosion into esophagus or gas forming bacteria around the screw    Recs:  -c/w metop and losartan for gdmt for lv dysfunction and anti-anginal/anti-arrhythmic effect.  -preop 12 lead ecg with old inferoseptal q waves - no acute changes. appears medically optimzied without any ischemic or hf sx. can proceed to or without further cardiac workup (pending icd interrogation)  -call EP for ICD interrogation prior to OR  -c/w lasix 40mg. appears euvolemic  -management of APA per surgery. resume as tolerates  -hx of pAF not on ac, remains on high dose asa and plavix  -multispecialty services appreciated - neuro, gi and ent  -s/p neg esophagram  -s/p ct chest. pulm consult appreciated  -s/p egd neg for perf          Over 25 minutes spent on total encounter; more than 50% of the visit was spent counseling and/or coordinating care by the attending physician.      Ministerio Dupont MD   Cardiovascular Disease  [phone]

## 2020-02-25 NOTE — PROGRESS NOTE ADULT - ASSESSMENT
59y POD #0 from anterior cervical approach for hardware removal. NO evidence of infection noted. Hardware removed by Neurosx. wound washed out with antibiotic irrigation. No signs of esophogeal perf. Ng tube was placed prophalticly and will be able to be removed in am after esophagram confirms no esphogeal injury. Nasir drain is in place.

## 2020-02-25 NOTE — PROGRESS NOTE ADULT - SUBJECTIVE AND OBJECTIVE BOX
Patient is a 59y old  Female who presents with a chief complaint of abnormal CT C spine (25 Feb 2020 07:22)      SUBJECTIVE / OVERNIGHT EVENTS: Comfortable without new complaints.   Review of Systems  chest pain no  palpitations no  sob no  nausea no  headache no    MEDICATIONS  (STANDING):  acetaminophen  IVPB .. 1000 milliGRAM(s) IV Intermittent every 6 hours  HYDROmorphone PCA (1 mG/mL) 30 milliLiter(s) PCA Continuous PCA Continuous  lactated ringers. 1000 milliLiter(s) (75 mL/Hr) IV Continuous <Continuous>    MEDICATIONS  (PRN):  HYDROmorphone  Injectable 0.5 milliGRAM(s) IV Push every 10 minutes PRN Moderate Pain (4 - 6)  HYDROmorphone PCA (1 mG/mL) Rescue Clinician Bolus 0.5 milliGRAM(s) IV Push every 15 minutes PRN for Pain Scale GREATER THAN 6  naloxone Injectable 0.1 milliGRAM(s) IV Push every 3 minutes PRN For ANY of the following changes in patient status:  A. RR LESS THAN 10 breaths per minute, B. Oxygen saturation LESS THAN 90%, C. Sedation score of 6  ondansetron Injectable 4 milliGRAM(s) IV Push every 6 hours PRN Nausea  ondansetron Injectable 4 milliGRAM(s) IV Push once PRN Nausea and/or Vomiting  oxyCODONE    IR 10 milliGRAM(s) Oral once PRN Severe Pain (7 - 10)      Vital Signs Last 24 Hrs  T(C): 36.5 (25 Feb 2020 20:00), Max: 37 (25 Feb 2020 19:15)  T(F): 97.7 (25 Feb 2020 20:00), Max: 98.6 (25 Feb 2020 19:15)  HR: 58 (25 Feb 2020 20:15) (53 - 82)  BP: 115/58 (25 Feb 2020 20:15) (90/51 - 136/61)  BP(mean): 83 (25 Feb 2020 20:15) (76 - 98)  RR: 16 (25 Feb 2020 20:15) (16 - 18)  SpO2: 99% (25 Feb 2020 20:15) (94% - 100%)    PHYSICAL EXAM:  GENERAL: NAD, well-developed  HEAD:  Atraumatic, Normocephalic  EYES: EOMI, PERRLA, conjunctiva and sclera clear  NECK: Supple, No JVD  CHEST/LUNG: Clear to auscultation bilaterally; No wheeze  HEART: Regular rate and rhythm; No murmurs, rubs, or gallops  ABDOMEN: Soft, Nontender, Nondistended; Bowel sounds present  EXTREMITIES:  2+ Peripheral Pulses, No clubbing, cyanosis, or edema  PSYCH: AAOx3  NEUROLOGY: non-focal  SKIN: No rashes or lesions    LABS:                        10.2   6.21  )-----------( 276      ( 25 Feb 2020 09:46 )             30.9     02-25    139  |  99  |  23  ----------------------------<  99  4.4   |  30  |  1.21    Ca    9.6      25 Feb 2020 09:46    TPro  7.2  /  Alb  3.9  /  TBili  0.4  /  DBili  x   /  AST  13  /  ALT  8<L>  /  AlkPhos  84  02-25    PT/INR - ( 25 Feb 2020 09:46 )   PT: 12.7 sec;   INR: 1.11 ratio         PTT - ( 25 Feb 2020 09:46 )  PTT:34.4 sec            RADIOLOGY & ADDITIONAL TESTS:    Imaging Personally Reviewed:    Consultant(s) Notes Reviewed:      Care Discussed with Consultants/Other Providers:

## 2020-02-25 NOTE — PROGRESS NOTE ADULT - ASSESSMENT
59 f with    Abnormal hardware Cervical spine  - ID evaluation/ observe off antibiotics  - Neurosurgical follow. For removal.  - pain control  - ENT follow.     Possible esophageal fistula  - GI follow.  - EGD noted.   - esophagogram with no perforation    AICD (automatic cardioverter/defibrillator) present   - EP check    Arthritis   - pain control    Atrial Fibrillation paroxismal Dx in 08/2009  rate control    Back pain   - pain control    CAD (Coronary Artery Disease)   - continue Rx  - cardiology evaluation   - Hold ASA and Plavix for OR.    Hypercholesteremia   - continue Rx    PAD (peripheral artery disease) r leg 06  -stable    Lung opacities  - Pulmonary evaluation Dr. Gutierrez noted    No acute medical condition identified to postpone planned surgical intervention.     d/w patient at length     Mega Zimmerman MD pager [phone]

## 2020-02-25 NOTE — PROGRESS NOTE ADULT - SUBJECTIVE AND OBJECTIVE BOX
ENT ISSUE/POD: POD #0 from anterior cervical approach for hardware removal     HPI: 59y POD #0 from anterior cervical approach for hardware removal. PT doing well, mild complaints of pain controlled with pain meds. PT denies any dysphagia, odynophagia, dysphonia, sob, dyspnea, changes in voice or inability to tolerated secretions. Pt voice sounds clear.       PAST MEDICAL & SURGICAL HISTORY:  AICD (automatic cardioverter/defibrillator) present  Pseudoarthrosis of lumbar spine  Stented coronary artery: x 3 ESTEVAN  Degenerative disc disease, lumbar  PAD (peripheral artery disease): r leg 06  Hypercholesteremia  Reflux  Back pain  MI (myocardial infarction)  Arthritis  CAD (Coronary Artery Disease): CARDIAC CATH PTCA/[place] x 3 to SVG to RCA 08/2009  Atrial Fibrillation: paroxismal Dx in 08/2009  Elective surgery: Left leg &quot;clean out&quot; secondary PAD  H/O cervical spine surgery  History of back surgery: T10 - L4 posterior fusion  Elective surgery: Right leg art bypass 2006  S/P lumbar spine operation: laminectomy  Elective surgery: AICD 2006  S/P coronary artery stent placement: x 3  - 2010  S/P coronary artery bypass graft x 4: 2006    Allergies    No Known Allergies    Intolerances    adhesives (Rash; Urticaria)    MEDICATIONS  (STANDING):  acetaminophen  IVPB .. 1000 milliGRAM(s) IV Intermittent every 6 hours  HYDROmorphone PCA (1 mG/mL) 30 milliLiter(s) PCA Continuous PCA Continuous  lactated ringers. 1000 milliLiter(s) (75 mL/Hr) IV Continuous <Continuous>    MEDICATIONS  (PRN):  HYDROmorphone  Injectable 0.5 milliGRAM(s) IV Push every 10 minutes PRN Moderate Pain (4 - 6)  HYDROmorphone PCA (1 mG/mL) Rescue Clinician Bolus 0.5 milliGRAM(s) IV Push every 15 minutes PRN for Pain Scale GREATER THAN 6  naloxone Injectable 0.1 milliGRAM(s) IV Push every 3 minutes PRN For ANY of the following changes in patient status:  A. RR LESS THAN 10 breaths per minute, B. Oxygen saturation LESS THAN 90%, C. Sedation score of 6  ondansetron Injectable 4 milliGRAM(s) IV Push every 6 hours PRN Nausea  ondansetron Injectable 4 milliGRAM(s) IV Push once PRN Nausea and/or Vomiting  oxyCODONE    IR 10 milliGRAM(s) Oral once PRN Severe Pain (7 - 10)      social history: see consult     family history: see consult   ROS:   ENT: all negative except as noted in HPI   Pulm: denies SOB, cough, hemoptysis  Neuro: denies numbness/tingling, loss of sensation  Endo: denies heat/cold intolerance, excessive sweating      Vital Signs Last 24 Hrs  T(C): 36.5 (25 Feb 2020 20:00), Max: 37 (25 Feb 2020 19:15)  T(F): 97.7 (25 Feb 2020 20:00), Max: 98.6 (25 Feb 2020 19:15)  HR: 58 (25 Feb 2020 20:15) (53 - 82)  BP: 115/58 (25 Feb 2020 20:15) (90/51 - 136/61)  BP(mean): 83 (25 Feb 2020 20:15) (76 - 98)  RR: 16 (25 Feb 2020 20:15) (16 - 18)  SpO2: 99% (25 Feb 2020 20:15) (94% - 100%)                          10.2   6.21  )-----------( 276      ( 25 Feb 2020 09:46 )             30.9    02-25    139  |  99  |  23  ----------------------------<  99  4.4   |  30  |  1.21    Ca    9.6      25 Feb 2020 09:46    TPro  7.2  /  Alb  3.9  /  TBili  0.4  /  DBili  x   /  AST  13  /  ALT  8<L>  /  AlkPhos  84  02-25   PT/INR - ( 25 Feb 2020 09:46 )   PT: 12.7 sec;   INR: 1.11 ratio         PTT - ( 25 Feb 2020 09:46 )  PTT:34.4 sec    PHYSICAL EXAM:  Gen: NAD  Skin: No rashes, bruises, or lesions  Head: Normocephalic, Atraumatic  Face: no edema, erythema, or fluctuance. Parotid glands soft without mass  Eyes: no scleral injection  Nose: Nares bilaterally patent, no discharge  Mouth: No Stridor / Drooling / Trismus.  Mucosa moist, tongue/uvula midline, oropharynx clear  Neck: Incision, c/d/i, PHILIP drain in place 5ml. no hematoma or crepitus.   Flat, supple, no lymphadenopathy, trachea midline, no masses  Lymphatic: No lymphadenopathy  Resp: breathing easily, no stridor  Neuro: facial nerve intact, no facial droop

## 2020-02-25 NOTE — PROGRESS NOTE ADULT - SUBJECTIVE AND OBJECTIVE BOX
Patient seen and examined at bedside.    --Anticoagulation--    T(C): 36.8 (02-25-20 @ 04:41), Max: 36.8 (02-25-20 @ 04:41)  HR: 58 (02-25-20 @ 04:41) (49 - 60)  BP: 101/64 (02-25-20 @ 04:41) (96/48 - 115/68)  RR: 18 (02-25-20 @ 04:41) (18 - 18)  SpO2: 94% (02-25-20 @ 04:41) (94% - 97%)  Wt(kg): --    Exam:   AOx3, FC, PERRL, EOMI, no facial   5/5 throughout, no drift  SILT  no clonus  R neck/shoulder pain

## 2020-02-25 NOTE — PROGRESS NOTE ADULT - ASSESSMENT
Paula, Lela  59F PMHx CAD s/p 4vCABG, MI, AICD, stents x3 on ASA plavix, HLD, afib, back pain s/p C5-C6 ACDF (2002) and T10-P fusion, sent in my PMD after ENT scoped and ordered CT c-spine which shows hardware and fusion failure at C5 with air around hardware, adjacent to esophagus. She has had intermittent fevers for 4 months but otherwise no complaints. CT L shows fused T10-P construct. Exam: neurointact, denies pain.  - Plan for OR Thursday with ENT  - Hold AC/AP  - pre op clearance

## 2020-02-26 PROCEDURE — 74220 X-RAY XM ESOPHAGUS 1CNTRST: CPT | Mod: 26

## 2020-02-26 PROCEDURE — 99024 POSTOP FOLLOW-UP VISIT: CPT

## 2020-02-26 RX ORDER — CALCIUM CARBONATE 500(1250)
1 TABLET ORAL THREE TIMES A DAY
Refills: 0 | Status: DISCONTINUED | OUTPATIENT
Start: 2020-02-26 | End: 2020-02-29

## 2020-02-26 RX ORDER — POLYETHYLENE GLYCOL 3350 17 G/17G
17 POWDER, FOR SOLUTION ORAL DAILY
Refills: 0 | Status: DISCONTINUED | OUTPATIENT
Start: 2020-02-26 | End: 2020-02-29

## 2020-02-26 RX ORDER — PANTOPRAZOLE SODIUM 20 MG/1
40 TABLET, DELAYED RELEASE ORAL
Refills: 0 | Status: DISCONTINUED | OUTPATIENT
Start: 2020-02-26 | End: 2020-02-29

## 2020-02-26 RX ORDER — METOPROLOL TARTRATE 50 MG
50 TABLET ORAL DAILY
Refills: 0 | Status: DISCONTINUED | OUTPATIENT
Start: 2020-02-26 | End: 2020-02-29

## 2020-02-26 RX ORDER — ACETAMINOPHEN 500 MG
1000 TABLET ORAL ONCE
Refills: 0 | Status: COMPLETED | OUTPATIENT
Start: 2020-02-26 | End: 2020-02-26

## 2020-02-26 RX ORDER — FUROSEMIDE 40 MG
40 TABLET ORAL DAILY
Refills: 0 | Status: DISCONTINUED | OUTPATIENT
Start: 2020-02-26 | End: 2020-02-29

## 2020-02-26 RX ORDER — LOSARTAN POTASSIUM 100 MG/1
50 TABLET, FILM COATED ORAL
Refills: 0 | Status: DISCONTINUED | OUTPATIENT
Start: 2020-02-26 | End: 2020-02-29

## 2020-02-26 RX ORDER — SENNA PLUS 8.6 MG/1
2 TABLET ORAL AT BEDTIME
Refills: 0 | Status: DISCONTINUED | OUTPATIENT
Start: 2020-02-26 | End: 2020-02-29

## 2020-02-26 RX ORDER — ATORVASTATIN CALCIUM 80 MG/1
40 TABLET, FILM COATED ORAL AT BEDTIME
Refills: 0 | Status: DISCONTINUED | OUTPATIENT
Start: 2020-02-26 | End: 2020-02-29

## 2020-02-26 RX ADMIN — HYDROMORPHONE HYDROCHLORIDE 30 MILLILITER(S): 2 INJECTION INTRAMUSCULAR; INTRAVENOUS; SUBCUTANEOUS at 18:30

## 2020-02-26 RX ADMIN — HYDROMORPHONE HYDROCHLORIDE 30 MILLILITER(S): 2 INJECTION INTRAMUSCULAR; INTRAVENOUS; SUBCUTANEOUS at 02:01

## 2020-02-26 RX ADMIN — Medication 400 MILLIGRAM(S): at 23:46

## 2020-02-26 RX ADMIN — HYDROMORPHONE HYDROCHLORIDE 30 MILLILITER(S): 2 INJECTION INTRAMUSCULAR; INTRAVENOUS; SUBCUTANEOUS at 06:08

## 2020-02-26 RX ADMIN — HYDROMORPHONE HYDROCHLORIDE 30 MILLILITER(S): 2 INJECTION INTRAMUSCULAR; INTRAVENOUS; SUBCUTANEOUS at 13:44

## 2020-02-26 RX ADMIN — ATORVASTATIN CALCIUM 40 MILLIGRAM(S): 80 TABLET, FILM COATED ORAL at 21:28

## 2020-02-26 RX ADMIN — HYDROMORPHONE HYDROCHLORIDE 30 MILLILITER(S): 2 INJECTION INTRAMUSCULAR; INTRAVENOUS; SUBCUTANEOUS at 07:16

## 2020-02-26 RX ADMIN — HYDROMORPHONE HYDROCHLORIDE 30 MILLILITER(S): 2 INJECTION INTRAMUSCULAR; INTRAVENOUS; SUBCUTANEOUS at 22:55

## 2020-02-26 RX ADMIN — Medication 1000 MILLIGRAM(S): at 00:25

## 2020-02-26 RX ADMIN — HYDROMORPHONE HYDROCHLORIDE 30 MILLILITER(S): 2 INJECTION INTRAMUSCULAR; INTRAVENOUS; SUBCUTANEOUS at 10:04

## 2020-02-26 RX ADMIN — HYDROMORPHONE HYDROCHLORIDE 30 MILLILITER(S): 2 INJECTION INTRAMUSCULAR; INTRAVENOUS; SUBCUTANEOUS at 22:03

## 2020-02-26 RX ADMIN — LOSARTAN POTASSIUM 50 MILLIGRAM(S): 100 TABLET, FILM COATED ORAL at 18:32

## 2020-02-26 RX ADMIN — HYDROMORPHONE HYDROCHLORIDE 30 MILLILITER(S): 2 INJECTION INTRAMUSCULAR; INTRAVENOUS; SUBCUTANEOUS at 19:18

## 2020-02-26 NOTE — PROGRESS NOTE ADULT - SUBJECTIVE AND OBJECTIVE BOX
ENT ISSUE/POD:  POD #1 from anterior cervical approach for hardware removal       HPI: 59y POD #1 from anterior cervical approach for hardware removal. PT doing well, mild complaints of pain controlled with pain meds. PT denies any dysphagia, odynophagia, dysphonia, sob, dyspnea, changes in voice or inability to tolerated secretions. Pt voice sounds clear.           PAST MEDICAL & SURGICAL HISTORY:  AICD (automatic cardioverter/defibrillator) present  Pseudoarthrosis of lumbar spine  Stented coronary artery: x 3 ESTEVAN  Degenerative disc disease, lumbar  PAD (peripheral artery disease): r leg 06  Hypercholesteremia  Reflux  Back pain  MI (myocardial infarction)  Arthritis  CAD (Coronary Artery Disease): CARDIAC CATH PTCA/[place] x 3 to SVG to RCA 08/2009  Atrial Fibrillation: paroxismal Dx in 08/2009  Elective surgery: Left leg &quot;clean out&quot; secondary PAD  H/O cervical spine surgery  History of back surgery: T10 - L4 posterior fusion  Elective surgery: Right leg art bypass 2006  S/P lumbar spine operation: laminectomy  Elective surgery: AICD 2006  S/P coronary artery stent placement: x 3  - 2010  S/P coronary artery bypass graft x 4: 2006    Allergies    No Known Allergies    Intolerances    adhesives (Rash; Urticaria)    MEDICATIONS  (STANDING):  atorvastatin 40 milliGRAM(s) Oral at bedtime  furosemide    Tablet 40 milliGRAM(s) Oral daily  HYDROmorphone PCA (1 mG/mL) 30 milliLiter(s) PCA Continuous PCA Continuous  lactated ringers. 1000 milliLiter(s) (75 mL/Hr) IV Continuous <Continuous>  losartan 50 milliGRAM(s) Oral two times a day  metoprolol succinate ER 50 milliGRAM(s) Oral daily  pantoprazole    Tablet 40 milliGRAM(s) Oral before breakfast  polyethylene glycol 3350 17 Gram(s) Oral daily  senna 2 Tablet(s) Oral at bedtime    MEDICATIONS  (PRN):  calcium carbonate    500 mG (Tums) Chewable 1 Tablet(s) Chew three times a day PRN Indigestion  HYDROmorphone PCA (1 mG/mL) Rescue Clinician Bolus 0.5 milliGRAM(s) IV Push every 15 minutes PRN for Pain Scale GREATER THAN 6  naloxone Injectable 0.1 milliGRAM(s) IV Push every 3 minutes PRN For ANY of the following changes in patient status:  A. RR LESS THAN 10 breaths per minute, B. Oxygen saturation LESS THAN 90%, C. Sedation score of 6  ondansetron Injectable 4 milliGRAM(s) IV Push every 6 hours PRN Nausea      Social History: see consult    Family history: see consult    ROS:   ENT: all negative except as noted in HPI   Pulm: denies SOB, cough, hemoptysis  Neuro: denies numbness/tingling, loss of sensation  Endo: denies heat/cold intolerance, excessive sweating      Vital Signs Last 24 Hrs  T(C): 36.9 (26 Feb 2020 12:51), Max: 37 (25 Feb 2020 19:15)  T(F): 98.5 (26 Feb 2020 12:51), Max: 98.6 (25 Feb 2020 19:15)  HR: 70 (26 Feb 2020 12:51) (56 - 70)  BP: 128/66 (26 Feb 2020 12:51) (96/56 - 138/73)  BP(mean): 78 (25 Feb 2020 21:05) (76 - 98)  RR: 18 (26 Feb 2020 12:51) (15 - 18)  SpO2: 94% (26 Feb 2020 12:51) (94% - 100%)                          10.2   6.21  )-----------( 276      ( 25 Feb 2020 09:46 )             30.9    02-25    139  |  99  |  23  ----------------------------<  99  4.4   |  30  |  1.21    Ca    9.6      25 Feb 2020 09:46    TPro  7.2  /  Alb  3.9  /  TBili  0.4  /  DBili  x   /  AST  13  /  ALT  8<L>  /  AlkPhos  84  02-25   PT/INR - ( 25 Feb 2020 09:46 )   PT: 12.7 sec;   INR: 1.11 ratio         PTT - ( 25 Feb 2020 09:46 )  PTT:34.4 sec    PHYSICAL EXAM:  Gen: NAD  Skin: No rashes, bruises, or lesions  Head: Normocephalic, Atraumatic  Face: no edema, erythema, or fluctuance. Parotid glands soft without mass  Eyes: no scleral injection  Nose: Nares bilaterally patent, no discharge  Mouth: No Stridor / Drooling / Trismus.  Mucosa moist, tongue/uvula midline, oropharynx clear  Neck: Incision, c/d/i, PHILIP drain puts out 20 CCs since Sx. No hematoma or crepitus.   Flat, supple, no lymphadenopathy, trachea midline, no masses  Lymphatic: No lymphadenopathy  Resp: breathing easily, no stridor  Neuro: facial nerve intact, no facial droop ENT ISSUE/POD:  POD #1 from anterior cervical approach for hardware removal       HPI: 59y POD #1 from anterior cervical approach for hardware removal. PT doing well, mild complaints of pain controlled with pain meds. PT denies any dysphagia, odynophagia, dysphonia, sob, dyspnea, changes in voice or inability to tolerated secretions. Pt voice sounds clear.           PAST MEDICAL & SURGICAL HISTORY:  AICD (automatic cardioverter/defibrillator) present  Pseudoarthrosis of lumbar spine  Stented coronary artery: x 3 ESTEVAN  Degenerative disc disease, lumbar  PAD (peripheral artery disease): r leg 06  Hypercholesteremia  Reflux  Back pain  MI (myocardial infarction)  Arthritis  CAD (Coronary Artery Disease): CARDIAC CATH PTCA/[place] x 3 to SVG to RCA 08/2009  Atrial Fibrillation: paroxismal Dx in 08/2009  Elective surgery: Left leg &quot;clean out&quot; secondary PAD  H/O cervical spine surgery  History of back surgery: T10 - L4 posterior fusion  Elective surgery: Right leg art bypass 2006  S/P lumbar spine operation: laminectomy  Elective surgery: AICD 2006  S/P coronary artery stent placement: x 3  - 2010  S/P coronary artery bypass graft x 4: 2006    Allergies    No Known Allergies    Intolerances    adhesives (Rash; Urticaria)    MEDICATIONS  (STANDING):  atorvastatin 40 milliGRAM(s) Oral at bedtime  furosemide    Tablet 40 milliGRAM(s) Oral daily  HYDROmorphone PCA (1 mG/mL) 30 milliLiter(s) PCA Continuous PCA Continuous  lactated ringers. 1000 milliLiter(s) (75 mL/Hr) IV Continuous <Continuous>  losartan 50 milliGRAM(s) Oral two times a day  metoprolol succinate ER 50 milliGRAM(s) Oral daily  pantoprazole    Tablet 40 milliGRAM(s) Oral before breakfast  polyethylene glycol 3350 17 Gram(s) Oral daily  senna 2 Tablet(s) Oral at bedtime    MEDICATIONS  (PRN):  calcium carbonate    500 mG (Tums) Chewable 1 Tablet(s) Chew three times a day PRN Indigestion  HYDROmorphone PCA (1 mG/mL) Rescue Clinician Bolus 0.5 milliGRAM(s) IV Push every 15 minutes PRN for Pain Scale GREATER THAN 6  naloxone Injectable 0.1 milliGRAM(s) IV Push every 3 minutes PRN For ANY of the following changes in patient status:  A. RR LESS THAN 10 breaths per minute, B. Oxygen saturation LESS THAN 90%, C. Sedation score of 6  ondansetron Injectable 4 milliGRAM(s) IV Push every 6 hours PRN Nausea      Social History: see consult    Family history: see consult    ROS:   ENT: all negative except as noted in HPI   Pulm: denies SOB, cough, hemoptysis  Neuro: denies numbness/tingling, loss of sensation  Endo: denies heat/cold intolerance, excessive sweating      Vital Signs Last 24 Hrs  T(C): 36.9 (26 Feb 2020 12:51), Max: 37 (25 Feb 2020 19:15)  T(F): 98.5 (26 Feb 2020 12:51), Max: 98.6 (25 Feb 2020 19:15)  HR: 70 (26 Feb 2020 12:51) (56 - 70)  BP: 128/66 (26 Feb 2020 12:51) (96/56 - 138/73)  BP(mean): 78 (25 Feb 2020 21:05) (76 - 98)  RR: 18 (26 Feb 2020 12:51) (15 - 18)  SpO2: 94% (26 Feb 2020 12:51) (94% - 100%)                          10.2   6.21  )-----------( 276      ( 25 Feb 2020 09:46 )             30.9    02-25    139  |  99  |  23  ----------------------------<  99  4.4   |  30  |  1.21    Ca    9.6      25 Feb 2020 09:46    TPro  7.2  /  Alb  3.9  /  TBili  0.4  /  DBili  x   /  AST  13  /  ALT  8<L>  /  AlkPhos  84  02-25   PT/INR - ( 25 Feb 2020 09:46 )   PT: 12.7 sec;   INR: 1.11 ratio         PTT - ( 25 Feb 2020 09:46 )  PTT:34.4 sec    PHYSICAL EXAM:  Gen: NAD  Skin: No rashes, bruises, or lesions  Head: Normocephalic, Atraumatic  Face: no edema, erythema, or fluctuance. Parotid glands soft without mass  Eyes: no scleral injection  Nose: Nares bilaterally patent, no discharge  Mouth: No Stridor / Drooling / Trismus.  Mucosa moist, tongue/uvula midline, oropharynx clear  Neck: Incision, c/d/i, PHILIP drain puts out 20 CCs since Sx and PHILIP drain was removed. No hematoma or crepitus.   Flat, supple, no lymphadenopathy, trachea midline, no masses  Lymphatic: No lymphadenopathy  Resp: breathing easily, no stridor  Neuro: facial nerve intact, no facial droop

## 2020-02-26 NOTE — PROVIDER CONTACT NOTE (OTHER) - ASSESSMENT
BP: 113/63, HR: 77, pt denies dizziness or sweating, able to tolerate OOB w. standby assist.
pt bp 94/65 HR 64- pt scheduled to receive Toprol XL and cozaar this evening but meds had no parameters to hold or give depending on the vital signs
pt has an order for npo after MN - pt is on po pain meds and she takes it q4hrs- npo has to be changed to NPO except meds
pt is on lasix in the am but with no parameters and pt is on subcut heparin this am-OR pending- /64 HR 58
upon last pacu vitals, BP dropped from 138/73 to 96/56. pt in bed resting comfortably, denies chest pain and sob, no signs and symptoms of distress noted. safety maintained. will cont. to monitor.

## 2020-02-26 NOTE — PROGRESS NOTE ADULT - ASSESSMENT
59 f with    s/p removal hardware Cervical spine  - ID evaluation/ observe off antibiotics  - Neurosurgical follow.  - ENT follow  - pain control  - NGT    Possible esophageal fistula  - GI follow.  - EGD noted.   - esophagogram with no perforation    AICD (automatic cardioverter/defibrillator) present   - EP check    Arthritis   - pain control    Atrial Fibrillation paroxismal Dx in 08/2009  rate control    Back pain   - pain control    CAD (Coronary Artery Disease)   - continue Rx  - cardiology evaluation   - Hold ASA and Plavix for OR.    Hypercholesteremia   - continue Rx    PAD (peripheral artery disease) r leg 06  -stable    Lung opacities  - Pulmonary follow Dr. Gutierrez noted    d/w patient at length     Mega Zimmerman MD pager [phone]

## 2020-02-26 NOTE — PROVIDER CONTACT NOTE (OTHER) - ACTION/TREATMENT ORDERED:
as per Med EPIFANIO Hu, recheck BP in 1 hr, if SBP >100 OK to admin Metoprolol, hold cozaar.
NP stated she will put an order as provider to RN that pt can take meds with sips. but NP wants RN to call her in the am for further instructions on am meds. will monitor.
NP stated to give the lasix now and as per ENT to give the heparin subcut. possible move other bp meds  to later if vitals are low. will monitor.
PA stated to hole BP med and cont to monitor. pt on IVFs. will monitor.
as per NP Sowmya, recheck BP manually, 106/60 noted manually. no other intervention needed as per NP. pt remains neurologically stable. safety maintained. will cont. to monitor.

## 2020-02-26 NOTE — PROGRESS NOTE ADULT - SUBJECTIVE AND OBJECTIVE BOX
Cardiovascular Disease Progress Note    Overnight events: No acute events overnight.  s/p OR. no postop cardiac events noted  Otherwise review of systems negative    Objective Findings:  T(C): 36.4 (02-26-20 @ 06:06), Max: 37 (02-25-20 @ 19:15)  HR: 60 (02-26-20 @ 06:06) (56 - 82)  BP: 125/71 (02-26-20 @ 06:06) (90/51 - 138/73)  RR: 18 (02-26-20 @ 06:06) (15 - 18)  SpO2: 100% (02-26-20 @ 06:06) (94% - 100%)  Wt(kg): --  Daily     Daily       Physical Exam:  Gen: NAD  HEENT: EOMI  CV: RRR, normal S1 + S2, no m/r/g  Lungs: CTAB  Abd: soft, non-tender  Ext: No edema    Telemetry:    Laboratory Data:                        10.2   6.21  )-----------( 276      ( 25 Feb 2020 09:46 )             30.9     02-25    139  |  99  |  23  ----------------------------<  99  4.4   |  30  |  1.21    Ca    9.6      25 Feb 2020 09:46    TPro  7.2  /  Alb  3.9  /  TBili  0.4  /  DBili  x   /  AST  13  /  ALT  8<L>  /  AlkPhos  84  02-25    PT/INR - ( 25 Feb 2020 09:46 )   PT: 12.7 sec;   INR: 1.11 ratio         PTT - ( 25 Feb 2020 09:46 )  PTT:34.4 sec          Inpatient Medications:  MEDICATIONS  (STANDING):  atorvastatin 40 milliGRAM(s) Oral at bedtime  furosemide    Tablet 40 milliGRAM(s) Oral daily  HYDROmorphone PCA (1 mG/mL) 30 milliLiter(s) PCA Continuous PCA Continuous  lactated ringers. 1000 milliLiter(s) (75 mL/Hr) IV Continuous <Continuous>  losartan 50 milliGRAM(s) Oral two times a day  metoprolol succinate ER 50 milliGRAM(s) Oral daily  pantoprazole    Tablet 40 milliGRAM(s) Oral before breakfast  polyethylene glycol 3350 17 Gram(s) Oral daily  senna 2 Tablet(s) Oral at bedtime      Assessment:  -CAD s/p 4vCABG and PCI  -ICM s/p AICD   -HLD  -pAFib  -chronic back pain s/p T10-L5 posterior spinal fusion for lumbar disc disease with radiculopathy   -? C spine with gas, implying erosion into esophagus or gas forming bacteria around the screw    Recs:  -c/w metop and losartan for gdmt for lv dysfunction and anti-anginal/anti-arrhythmic effect.  -no postop cardiac events noted  -c/w lasix 40mg prn to maintain euvolemic. can hold while npo  -management of APA per surgery. resume as tolerates  -hx of pAF not on ac, remains on high dose asa and plavix  -multispecialty services appreciated - neuro, gi and ent  -s/p neg esophagram  -s/p ct chest. pulm consult appreciated  -s/p egd neg for perf      Over 25 minutes spent on total encounter; more than 50% of the visit was spent counseling and/or coordinating care by the attending physician.      Ministerio Dupont MD   Cardiovascular Disease  [phone]

## 2020-02-26 NOTE — PROGRESS NOTE ADULT - SUBJECTIVE AND OBJECTIVE BOX
Day 1 of Anesthesia Pain Management Service    SUBJECTIVE: I'm doing ok    Pain Scale Score:	[X] Refer to charted pain scores    THERAPY:    [ ] IV PCA Morphine		[ ] 5 mg/mL	[ ] 1 mg/mL  [X] IV PCA Hydromorphone	[ ] 5 mg/mL	[X] 1 mg/mL  [ ] IV PCA Fentanyl		[ ] 50 micrograms/mL    Demand dose: 0.3 mg     Lockout: 6 minutes   Continuous Rate: 0 mg/hr  4 Hour Limit: 4 mg    MEDICATIONS  (STANDING):  atorvastatin 40 milliGRAM(s) Oral at bedtime  furosemide    Tablet 40 milliGRAM(s) Oral daily  HYDROmorphone PCA (1 mG/mL) 30 milliLiter(s) PCA Continuous PCA Continuous  lactated ringers. 1000 milliLiter(s) (75 mL/Hr) IV Continuous <Continuous>  losartan 50 milliGRAM(s) Oral two times a day  metoprolol succinate ER 50 milliGRAM(s) Oral daily  pantoprazole    Tablet 40 milliGRAM(s) Oral before breakfast  polyethylene glycol 3350 17 Gram(s) Oral daily  senna 2 Tablet(s) Oral at bedtime    MEDICATIONS  (PRN):  calcium carbonate    500 mG (Tums) Chewable 1 Tablet(s) Chew three times a day PRN Indigestion  HYDROmorphone PCA (1 mG/mL) Rescue Clinician Bolus 0.5 milliGRAM(s) IV Push every 15 minutes PRN for Pain Scale GREATER THAN 6  naloxone Injectable 0.1 milliGRAM(s) IV Push every 3 minutes PRN For ANY of the following changes in patient status:  A. RR LESS THAN 10 breaths per minute, B. Oxygen saturation LESS THAN 90%, C. Sedation score of 6  ondansetron Injectable 4 milliGRAM(s) IV Push every 6 hours PRN Nausea      OBJECTIVE:    Sedation Score:	[ X] Alert 	[ ] Drowsy 	[ ] Arousable	[ ] Asleep	[ ] Unresponsive    Side Effects:	[X ] None	[ ] Nausea	[ ] Vomiting	[ ] Pruritus  		[ ] Other:    Vital Signs Last 24 Hrs  T(C): 36.8 (26 Feb 2020 08:10), Max: 37 (25 Feb 2020 19:15)  T(F): 98.3 (26 Feb 2020 08:10), Max: 98.6 (25 Feb 2020 19:15)  HR: 63 (26 Feb 2020 08:10) (56 - 82)  BP: 137/66 (26 Feb 2020 08:10) (96/56 - 138/73)  BP(mean): 78 (25 Feb 2020 21:05) (76 - 98)  RR: 18 (26 Feb 2020 08:10) (15 - 18)  SpO2: 100% (26 Feb 2020 08:10) (94% - 100%)    ASSESSMENT/ PLAN    Therapy to  be:               [X] Continued   [ ] Discontinued   [ ] Changed to PRN Analgesics    Documentation and Verification of current medications:   [X] Done	[ ] Not done, not eligible    Comments: Endorsing some pain. PCA use 1-3x/hr. Encouraged increased use. Resume Oxycodone ER when able to tolerate po. Recommend chronic pain follow up

## 2020-02-26 NOTE — PROGRESS NOTE ADULT - SUBJECTIVE AND OBJECTIVE BOX
Patient seen and examined at bedside.    --Anticoagulation--    T(C): 36.4 (02-26-20 @ 01:05), Max: 37 (02-25-20 @ 19:15)  HR: 66 (02-26-20 @ 01:30) (53 - 82)  BP: 106/60 (02-26-20 @ 01:30) (90/51 - 138/73)  RR: 16 (02-26-20 @ 01:05) (15 - 18)  SpO2: 98% (02-26-20 @ 01:05) (94% - 100%)  Wt(kg): --    Exam: AAOx3, FC, NELSON 5/5, mild discomfort but managed with pain medication

## 2020-02-26 NOTE — PROGRESS NOTE ADULT - ASSESSMENT
Paula, Lela  59F PMHx CAD s/p 4vCABG, MI, AICD, stents x3 on ASA plavix, HLD, afib, back pain s/p C5-C6 ACDF (2002) and T10-P fusion, sent in my PMD after ENT scoped and ordered CT c-spine which shows hardware and fusion failure at C5 with air around hardware, adjacent to esophagus. She has had intermittent fevers for 4 months but otherwise no complaints. CT L shows fused T10-P construct. Exam: neurointact, denies pain.  - POD#1 s/p hardware removal doing well post op  - OK to resume aspirin in 1 week from neurosurgical perspective, confirm with ENT

## 2020-02-26 NOTE — PROGRESS NOTE ADULT - ASSESSMENT
59y POD #1 from anterior cervical approach for hardware removal. NO evidence of infection noted. Hardware removed by Neurosx. wound washed out with antibiotic irrigation. No signs of esophogeal perf. 59y POD #1 from anterior cervical approach for hardware removal. NO evidence of infection noted. Hardware removed by Neurosx. wound washed out with antibiotic irrigation. No signs of esophogeal perf. Esophagram this AM was normal, no perforation. PHILIP drain and KO feeding tube removed.

## 2020-02-26 NOTE — PROGRESS NOTE ADULT - SUBJECTIVE AND OBJECTIVE BOX
Patient is a 59y old  Female who presents with a chief complaint of abnormal CT C spine (26 Feb 2020 11:03)      SUBJECTIVE / OVERNIGHT EVENTS: Comfortable.   Review of Systems  chest pain no  palpitations no  sob no  nausea no  headache no    MEDICATIONS  (STANDING):  atorvastatin 40 milliGRAM(s) Oral at bedtime  furosemide    Tablet 40 milliGRAM(s) Oral daily  HYDROmorphone PCA (1 mG/mL) 30 milliLiter(s) PCA Continuous PCA Continuous  lactated ringers. 1000 milliLiter(s) (75 mL/Hr) IV Continuous <Continuous>  losartan 50 milliGRAM(s) Oral two times a day  metoprolol succinate ER 50 milliGRAM(s) Oral daily  pantoprazole    Tablet 40 milliGRAM(s) Oral before breakfast  polyethylene glycol 3350 17 Gram(s) Oral daily  senna 2 Tablet(s) Oral at bedtime    MEDICATIONS  (PRN):  calcium carbonate    500 mG (Tums) Chewable 1 Tablet(s) Chew three times a day PRN Indigestion  HYDROmorphone PCA (1 mG/mL) Rescue Clinician Bolus 0.5 milliGRAM(s) IV Push every 15 minutes PRN for Pain Scale GREATER THAN 6  naloxone Injectable 0.1 milliGRAM(s) IV Push every 3 minutes PRN For ANY of the following changes in patient status:  A. RR LESS THAN 10 breaths per minute, B. Oxygen saturation LESS THAN 90%, C. Sedation score of 6  ondansetron Injectable 4 milliGRAM(s) IV Push every 6 hours PRN Nausea      Vital Signs Last 24 Hrs  T(C): 36.4 (26 Feb 2020 20:14), Max: 36.9 (26 Feb 2020 12:51)  T(F): 97.6 (26 Feb 2020 20:14), Max: 98.5 (26 Feb 2020 12:51)  HR: 67 (26 Feb 2020 20:14) (57 - 71)  BP: 113/66 (26 Feb 2020 20:14) (96/56 - 138/73)  BP(mean): --  RR: 18 (26 Feb 2020 20:14) (16 - 18)  SpO2: 98% (26 Feb 2020 20:14) (94% - 100%)    PHYSICAL EXAM:  GENERAL: NAD, well-developed  HEAD:  Atraumatic, Normocephalic  EYES: EOMI, PERRLA, conjunctiva and sclera clear  NECK: Supple, No JVD Incision clean. HV in place.   CHEST/LUNG: Clear to auscultation bilaterally; No wheeze  HEART: Regular rate and rhythm; No murmurs, rubs, or gallops  ABDOMEN: Soft, Nontender, Nondistended; Bowel sounds present NGT  EXTREMITIES:  2+ Peripheral Pulses, No clubbing, cyanosis, or edema  PSYCH: AAOx3  NEUROLOGY: non-focal  SKIN: No rashes or lesions    LABS:                        10.2   6.21  )-----------( 276      ( 25 Feb 2020 09:46 )             30.9     02-25    139  |  99  |  23  ----------------------------<  99  4.4   |  30  |  1.21    Ca    9.6      25 Feb 2020 09:46    TPro  7.2  /  Alb  3.9  /  TBili  0.4  /  DBili  x   /  AST  13  /  ALT  8<L>  /  AlkPhos  84  02-25    PT/INR - ( 25 Feb 2020 09:46 )   PT: 12.7 sec;   INR: 1.11 ratio         PTT - ( 25 Feb 2020 09:46 )  PTT:34.4 sec            RADIOLOGY & ADDITIONAL TESTS:    Imaging Personally Reviewed:    Consultant(s) Notes Reviewed:      Care Discussed with Consultants/Other Providers:

## 2020-02-27 LAB
ANION GAP SERPL CALC-SCNC: 11 MMOL/L — SIGNIFICANT CHANGE UP (ref 5–17)
BUN SERPL-MCNC: 9 MG/DL — SIGNIFICANT CHANGE UP (ref 7–23)
CALCIUM SERPL-MCNC: 9.7 MG/DL — SIGNIFICANT CHANGE UP (ref 8.4–10.5)
CHLORIDE SERPL-SCNC: 102 MMOL/L — SIGNIFICANT CHANGE UP (ref 96–108)
CO2 SERPL-SCNC: 30 MMOL/L — SIGNIFICANT CHANGE UP (ref 22–31)
CREAT SERPL-MCNC: 0.71 MG/DL — SIGNIFICANT CHANGE UP (ref 0.5–1.3)
GLUCOSE SERPL-MCNC: 85 MG/DL — SIGNIFICANT CHANGE UP (ref 70–99)
HCT VFR BLD CALC: 30.8 % — LOW (ref 34.5–45)
HGB BLD-MCNC: 10.1 G/DL — LOW (ref 11.5–15.5)
MCHC RBC-ENTMCNC: 32.8 GM/DL — SIGNIFICANT CHANGE UP (ref 32–36)
MCHC RBC-ENTMCNC: 34.9 PG — HIGH (ref 27–34)
MCV RBC AUTO: 106.6 FL — HIGH (ref 80–100)
NRBC # BLD: 0 /100 WBCS — SIGNIFICANT CHANGE UP (ref 0–0)
PLATELET # BLD AUTO: 269 K/UL — SIGNIFICANT CHANGE UP (ref 150–400)
POTASSIUM SERPL-MCNC: 4.2 MMOL/L — SIGNIFICANT CHANGE UP (ref 3.5–5.3)
POTASSIUM SERPL-SCNC: 4.2 MMOL/L — SIGNIFICANT CHANGE UP (ref 3.5–5.3)
RBC # BLD: 2.89 M/UL — LOW (ref 3.8–5.2)
RBC # FLD: 12.6 % — SIGNIFICANT CHANGE UP (ref 10.3–14.5)
SODIUM SERPL-SCNC: 143 MMOL/L — SIGNIFICANT CHANGE UP (ref 135–145)
WBC # BLD: 4.86 K/UL — SIGNIFICANT CHANGE UP (ref 3.8–10.5)
WBC # FLD AUTO: 4.86 K/UL — SIGNIFICANT CHANGE UP (ref 3.8–10.5)

## 2020-02-27 RX ORDER — CYCLOBENZAPRINE HYDROCHLORIDE 10 MG/1
5 TABLET, FILM COATED ORAL ONCE
Refills: 0 | Status: COMPLETED | OUTPATIENT
Start: 2020-02-27 | End: 2020-02-27

## 2020-02-27 RX ORDER — OXYCODONE HYDROCHLORIDE 5 MG/1
10 TABLET ORAL EVERY 12 HOURS
Refills: 0 | Status: DISCONTINUED | OUTPATIENT
Start: 2020-02-27 | End: 2020-02-29

## 2020-02-27 RX ADMIN — HYDROMORPHONE HYDROCHLORIDE 30 MILLILITER(S): 2 INJECTION INTRAMUSCULAR; INTRAVENOUS; SUBCUTANEOUS at 06:37

## 2020-02-27 RX ADMIN — ATORVASTATIN CALCIUM 40 MILLIGRAM(S): 80 TABLET, FILM COATED ORAL at 21:30

## 2020-02-27 RX ADMIN — OXYCODONE HYDROCHLORIDE 10 MILLIGRAM(S): 5 TABLET ORAL at 12:02

## 2020-02-27 RX ADMIN — OXYCODONE HYDROCHLORIDE 10 MILLIGRAM(S): 5 TABLET ORAL at 12:30

## 2020-02-27 RX ADMIN — HYDROMORPHONE HYDROCHLORIDE 30 MILLILITER(S): 2 INJECTION INTRAMUSCULAR; INTRAVENOUS; SUBCUTANEOUS at 10:41

## 2020-02-27 RX ADMIN — LOSARTAN POTASSIUM 50 MILLIGRAM(S): 100 TABLET, FILM COATED ORAL at 17:31

## 2020-02-27 RX ADMIN — HYDROMORPHONE HYDROCHLORIDE 30 MILLILITER(S): 2 INJECTION INTRAMUSCULAR; INTRAVENOUS; SUBCUTANEOUS at 02:10

## 2020-02-27 RX ADMIN — HYDROMORPHONE HYDROCHLORIDE 30 MILLILITER(S): 2 INJECTION INTRAMUSCULAR; INTRAVENOUS; SUBCUTANEOUS at 19:26

## 2020-02-27 RX ADMIN — HYDROMORPHONE HYDROCHLORIDE 30 MILLILITER(S): 2 INJECTION INTRAMUSCULAR; INTRAVENOUS; SUBCUTANEOUS at 14:57

## 2020-02-27 RX ADMIN — HYDROMORPHONE HYDROCHLORIDE 30 MILLILITER(S): 2 INJECTION INTRAMUSCULAR; INTRAVENOUS; SUBCUTANEOUS at 17:53

## 2020-02-27 RX ADMIN — LOSARTAN POTASSIUM 50 MILLIGRAM(S): 100 TABLET, FILM COATED ORAL at 05:38

## 2020-02-27 RX ADMIN — Medication 40 MILLIGRAM(S): at 08:41

## 2020-02-27 RX ADMIN — Medication 50 MILLIGRAM(S): at 05:38

## 2020-02-27 RX ADMIN — Medication 1000 MILLIGRAM(S): at 00:15

## 2020-02-27 RX ADMIN — HYDROMORPHONE HYDROCHLORIDE 30 MILLILITER(S): 2 INJECTION INTRAMUSCULAR; INTRAVENOUS; SUBCUTANEOUS at 07:34

## 2020-02-27 RX ADMIN — CYCLOBENZAPRINE HYDROCHLORIDE 5 MILLIGRAM(S): 10 TABLET, FILM COATED ORAL at 21:30

## 2020-02-27 RX ADMIN — OXYCODONE HYDROCHLORIDE 10 MILLIGRAM(S): 5 TABLET ORAL at 23:45

## 2020-02-27 RX ADMIN — OXYCODONE HYDROCHLORIDE 10 MILLIGRAM(S): 5 TABLET ORAL at 23:17

## 2020-02-27 RX ADMIN — HYDROMORPHONE HYDROCHLORIDE 30 MILLILITER(S): 2 INJECTION INTRAMUSCULAR; INTRAVENOUS; SUBCUTANEOUS at 21:38

## 2020-02-27 RX ADMIN — PANTOPRAZOLE SODIUM 40 MILLIGRAM(S): 20 TABLET, DELAYED RELEASE ORAL at 05:38

## 2020-02-27 NOTE — PROGRESS NOTE ADULT - ASSESSMENT
59y POD #2 from anterior cervical approach for hardware removal. No evidence of infection noted. Hardware removed by Neurosx. Wound washed out with antibiotic irrigation. No signs of esophogeal perf. Esophagram yesterday 2/26 was normal, no perforation. PHILIP drain and KO feeding tube have since been removed. WBC 4.86, afebrile, HR 96.

## 2020-02-27 NOTE — PROGRESS NOTE ADULT - SUBJECTIVE AND OBJECTIVE BOX
Patient is a 59y old  Female who presents with a chief complaint of abnormal CT C spine (27 Feb 2020 09:22)      SUBJECTIVE / OVERNIGHT EVENTS: feels better  Review of Systems  chest pain no  palpitations no  sob no  nausea no  headache no    MEDICATIONS  (STANDING):  atorvastatin 40 milliGRAM(s) Oral at bedtime  furosemide    Tablet 40 milliGRAM(s) Oral daily  HYDROmorphone PCA (1 mG/mL) 30 milliLiter(s) PCA Continuous PCA Continuous  lactated ringers. 1000 milliLiter(s) (75 mL/Hr) IV Continuous <Continuous>  losartan 50 milliGRAM(s) Oral two times a day  metoprolol succinate ER 50 milliGRAM(s) Oral daily  oxyCODONE  ER Tablet 10 milliGRAM(s) Oral every 12 hours  pantoprazole    Tablet 40 milliGRAM(s) Oral before breakfast  polyethylene glycol 3350 17 Gram(s) Oral daily  senna 2 Tablet(s) Oral at bedtime    MEDICATIONS  (PRN):  calcium carbonate    500 mG (Tums) Chewable 1 Tablet(s) Chew three times a day PRN Indigestion  HYDROmorphone PCA (1 mG/mL) Rescue Clinician Bolus 0.5 milliGRAM(s) IV Push every 15 minutes PRN for Pain Scale GREATER THAN 6  naloxone Injectable 0.1 milliGRAM(s) IV Push every 3 minutes PRN For ANY of the following changes in patient status:  A. RR LESS THAN 10 breaths per minute, B. Oxygen saturation LESS THAN 90%, C. Sedation score of 6  ondansetron Injectable 4 milliGRAM(s) IV Push every 6 hours PRN Nausea      Vital Signs Last 24 Hrs  T(C): 36.6 (27 Feb 2020 16:46), Max: 37.2 (27 Feb 2020 09:00)  T(F): 97.8 (27 Feb 2020 16:46), Max: 98.9 (27 Feb 2020 09:00)  HR: 66 (27 Feb 2020 16:46) (62 - 96)  BP: 147/65 (27 Feb 2020 16:46) (94/60 - 147/65)  BP(mean): --  RR: 16 (27 Feb 2020 16:46) (16 - 18)  SpO2: 93% (27 Feb 2020 16:46) (93% - 100%)    PHYSICAL EXAM:  GENERAL: NAD  HEAD:  Atraumatic, Normocephalic  EYES: EOMI, PERRLA, conjunctiva and sclera clear  NECK: Supple, No JVD  CHEST/LUNG: Clear to auscultation bilaterally; No wheeze  HEART: Regular rate and rhythm; No murmurs, rubs, or gallops  ABDOMEN: Soft, Nontender, Nondistended; Bowel sounds present  EXTREMITIES:  2+ Peripheral Pulses, No clubbing, cyanosis, or edema  PSYCH: AAOx3   NEUROLOGY: non-focal  SKIN: No rashes or lesions    LABS:                        10.1   4.86  )-----------( 269      ( 27 Feb 2020 08:16 )             30.8     02-27    143  |  102  |  9   ----------------------------<  85  4.2   |  30  |  0.71    Ca    9.7      27 Feb 2020 08:16                  RADIOLOGY & ADDITIONAL TESTS:    Imaging Personally Reviewed:    Consultant(s) Notes Reviewed:      Care Discussed with Consultants/Other Providers:

## 2020-02-27 NOTE — PROGRESS NOTE ADULT - SUBJECTIVE AND OBJECTIVE BOX
Cardiovascular Disease Progress Note    Overnight events: No acute events overnight. no new cardiac sx    Otherwise review of systems negative    Objective Findings:  T(C): 36.6 (02-27-20 @ 05:36), Max: 36.9 (02-26-20 @ 12:51)  HR: 96 (02-27-20 @ 05:36) (62 - 96)  BP: 122/72 (02-27-20 @ 05:36) (94/60 - 137/66)  RR: 16 (02-27-20 @ 05:36) (16 - 18)  SpO2: 100% (02-27-20 @ 05:36) (94% - 100%)  Wt(kg): --  Daily     Daily       Physical Exam:  Gen: NAD  HEENT: EOMI  CV: RRR, normal S1 + S2, no m/r/g  Lungs: CTAB  Abd: soft, non-tender  Ext: No edema      Laboratory Data:                        10.2   6.21  )-----------( 276      ( 25 Feb 2020 09:46 )             30.9     02-25    139  |  99  |  23  ----------------------------<  99  4.4   |  30  |  1.21    Ca    9.6      25 Feb 2020 09:46    TPro  7.2  /  Alb  3.9  /  TBili  0.4  /  DBili  x   /  AST  13  /  ALT  8<L>  /  AlkPhos  84  02-25    PT/INR - ( 25 Feb 2020 09:46 )   PT: 12.7 sec;   INR: 1.11 ratio         PTT - ( 25 Feb 2020 09:46 )  PTT:34.4 sec          Inpatient Medications:  MEDICATIONS  (STANDING):  atorvastatin 40 milliGRAM(s) Oral at bedtime  furosemide    Tablet 40 milliGRAM(s) Oral daily  HYDROmorphone PCA (1 mG/mL) 30 milliLiter(s) PCA Continuous PCA Continuous  lactated ringers. 1000 milliLiter(s) (75 mL/Hr) IV Continuous <Continuous>  losartan 50 milliGRAM(s) Oral two times a day  metoprolol succinate ER 50 milliGRAM(s) Oral daily  pantoprazole    Tablet 40 milliGRAM(s) Oral before breakfast  polyethylene glycol 3350 17 Gram(s) Oral daily  senna 2 Tablet(s) Oral at bedtime      Assessment:  -CAD s/p 4vCABG and PCI  -ICM s/p AICD   -HLD  -pAFib  -chronic back pain s/p T10-L5 posterior spinal fusion for lumbar disc disease with radiculopathy   -? C spine with gas, implying erosion into esophagus or gas forming bacteria around the screw    Recs:  -c/w metop and losartan for gdmt for lv dysfunction and anti-anginal/anti-arrhythmic effect.  -no postop cardiac events noted  -c/w lasix 40mg prn to maintain euvolemic. can hold while npo  -management of APA per surgery. resume as tolerates  -hx of pAF not on ac, remains on high dose asa and plavix  -multispecialty services appreciated - neuro, gi, pulm and ent          Over 25 minutes spent on total encounter; more than 50% of the visit was spent counseling and/or coordinating care by the attending physician.      Ministerio Dupont MD   Cardiovascular Disease  [phone]

## 2020-02-27 NOTE — PROGRESS NOTE ADULT - ASSESSMENT
Paula, Lela  59F PMHx CAD s/p 4vCABG, MI, AICD, stents x3 on ASA plavix, HLD, afib, back pain s/p C5-C6 ACDF (2002) and T10-P fusion, sent in my PMD after ENT scoped and ordered CT c-spine which shows hardware and fusion failure at C5 with air around hardware, adjacent to esophagus. She has had intermittent fevers for 4 months but otherwise no complaints. CT L shows fused T10-P construct. Exam: neurointact, denies pain.  - Esophogram today

## 2020-02-27 NOTE — PROGRESS NOTE ADULT - SUBJECTIVE AND OBJECTIVE BOX
Day 2 of Anesthesia Pain Management Service    SUBJECTIVE: I'm doing ok, still having some pain    Pain Scale Score:	[X] Refer to charted pain scores    THERAPY:    [ ] IV PCA Morphine		[ ] 5 mg/mL	[ ] 1 mg/mL  [X] IV PCA Hydromorphone	[ ] 5 mg/mL	[X] 1 mg/mL  [ ] IV PCA Fentanyl		[ ] 50 micrograms/mL    Demand dose: 0.3 mg     Lockout: 6 minutes   Continuous Rate: 0 mg/hr  4 Hour Limit: 4 mg    MEDICATIONS  (STANDING):  atorvastatin 40 milliGRAM(s) Oral at bedtime  furosemide    Tablet 40 milliGRAM(s) Oral daily  HYDROmorphone PCA (1 mG/mL) 30 milliLiter(s) PCA Continuous PCA Continuous  lactated ringers. 1000 milliLiter(s) (75 mL/Hr) IV Continuous <Continuous>  losartan 50 milliGRAM(s) Oral two times a day  metoprolol succinate ER 50 milliGRAM(s) Oral daily  oxyCODONE  ER Tablet 10 milliGRAM(s) Oral every 12 hours  pantoprazole    Tablet 40 milliGRAM(s) Oral before breakfast  polyethylene glycol 3350 17 Gram(s) Oral daily  senna 2 Tablet(s) Oral at bedtime    MEDICATIONS  (PRN):  calcium carbonate    500 mG (Tums) Chewable 1 Tablet(s) Chew three times a day PRN Indigestion  HYDROmorphone PCA (1 mG/mL) Rescue Clinician Bolus 0.5 milliGRAM(s) IV Push every 15 minutes PRN for Pain Scale GREATER THAN 6  naloxone Injectable 0.1 milliGRAM(s) IV Push every 3 minutes PRN For ANY of the following changes in patient status:  A. RR LESS THAN 10 breaths per minute, B. Oxygen saturation LESS THAN 90%, C. Sedation score of 6  ondansetron Injectable 4 milliGRAM(s) IV Push every 6 hours PRN Nausea      OBJECTIVE:    Sedation Score:	[ X] Alert 	[ ] Drowsy 	[ ] Arousable	[ ] Asleep	[ ] Unresponsive    Side Effects:	[X ] None	[ ] Nausea	[ ] Vomiting	[ ] Pruritus  		[ ] Other:    Vital Signs Last 24 Hrs  T(C): 36.6 (27 Feb 2020 05:36), Max: 36.9 (26 Feb 2020 12:51)  T(F): 97.9 (27 Feb 2020 05:36), Max: 98.5 (26 Feb 2020 12:51)  HR: 96 (27 Feb 2020 05:36) (62 - 96)  BP: 122/72 (27 Feb 2020 05:36) (94/60 - 131/66)  BP(mean): --  RR: 16 (27 Feb 2020 05:36) (16 - 18)  SpO2: 100% (27 Feb 2020 05:36) (94% - 100%)    ASSESSMENT/ PLAN    Therapy to  be:               [X] Continued   [ ] Discontinued   [ ] Changed to PRN Analgesics    Documentation and Verification of current medications:   [X] Done	[ ] Not done, not eligible    Comments: Endorsing incisional pain. PCA use 1-4x/hr. Tolerating po. Resume Oxycodone ER 10mg po q12. Encouraged continued use of PCA. Consider D\C PCA later today and restart   preoperative Oxycodone 30mg po prn dosing.

## 2020-02-27 NOTE — PROGRESS NOTE ADULT - SUBJECTIVE AND OBJECTIVE BOX
ENT ISSUE/POD: POD #2 from anterior cervical approach for hardware removal     HPI: 59y POD #2 from anterior cervical approach for hardware removal. PT doing well, mild complaints of pain controlled with pain meds. PT denies any dysphagia, odynophagia, dysphonia, sob, dyspnea, changes in voice or inability to tolerated secretions. Pt voice sounds clear.         PAST MEDICAL & SURGICAL HISTORY:  AICD (automatic cardioverter/defibrillator) present  Pseudoarthrosis of lumbar spine  Stented coronary artery: x 3 ESTEVAN  Degenerative disc disease, lumbar  PAD (peripheral artery disease): r leg 06  Hypercholesteremia  Reflux  Back pain  MI (myocardial infarction)  Arthritis  CAD (Coronary Artery Disease): CARDIAC CATH PTCA/Shattuck x 3 to SVG to RCA 08/2009  Atrial Fibrillation: paroxismal Dx in 08/2009  Elective surgery: Left leg &quot;clean out&quot; secondary PAD  H/O cervical spine surgery  History of back surgery: T10 - L4 posterior fusion  Elective surgery: Right leg art bypass 2006  S/P lumbar spine operation: laminectomy  Elective surgery: AICD 2006  S/P coronary artery stent placement: x 3  - 2010  S/P coronary artery bypass graft x 4: 2006    Allergies    No Known Allergies    Intolerances    adhesives (Rash; Urticaria)    MEDICATIONS  (STANDING):  atorvastatin 40 milliGRAM(s) Oral at bedtime  furosemide    Tablet 40 milliGRAM(s) Oral daily  HYDROmorphone PCA (1 mG/mL) 30 milliLiter(s) PCA Continuous PCA Continuous  lactated ringers. 1000 milliLiter(s) (75 mL/Hr) IV Continuous <Continuous>  losartan 50 milliGRAM(s) Oral two times a day  metoprolol succinate ER 50 milliGRAM(s) Oral daily  pantoprazole    Tablet 40 milliGRAM(s) Oral before breakfast  polyethylene glycol 3350 17 Gram(s) Oral daily  senna 2 Tablet(s) Oral at bedtime    MEDICATIONS  (PRN):  calcium carbonate    500 mG (Tums) Chewable 1 Tablet(s) Chew three times a day PRN Indigestion  HYDROmorphone PCA (1 mG/mL) Rescue Clinician Bolus 0.5 milliGRAM(s) IV Push every 15 minutes PRN for Pain Scale GREATER THAN 6  naloxone Injectable 0.1 milliGRAM(s) IV Push every 3 minutes PRN For ANY of the following changes in patient status:  A. RR LESS THAN 10 breaths per minute, B. Oxygen saturation LESS THAN 90%, C. Sedation score of 6  ondansetron Injectable 4 milliGRAM(s) IV Push every 6 hours PRN Nausea      Social History: see consult    Family history: see consult    ROS:   ENT: all negative except as noted in HPI   Pulm: denies SOB, cough, hemoptysis  Neuro: denies numbness/tingling, loss of sensation  Endo: denies heat/cold intolerance, excessive sweating      Vital Signs Last 24 Hrs  T(C): 36.6 (27 Feb 2020 05:36), Max: 36.9 (26 Feb 2020 12:51)  T(F): 97.9 (27 Feb 2020 05:36), Max: 98.5 (26 Feb 2020 12:51)  HR: 96 (27 Feb 2020 05:36) (62 - 96)  BP: 122/72 (27 Feb 2020 05:36) (94/60 - 131/66)  BP(mean): --  RR: 16 (27 Feb 2020 05:36) (16 - 18)  SpO2: 100% (27 Feb 2020 05:36) (94% - 100%)                          10.1   4.86  )-----------( 269      ( 27 Feb 2020 08:16 )             30.8    02-27    143  |  102  |  9   ----------------------------<  85  4.2   |  30  |  0.71    Ca    9.7      27 Feb 2020 08:16    TPro  7.2  /  Alb  3.9  /  TBili  0.4  /  DBili  x   /  AST  13  /  ALT  8<L>  /  AlkPhos  84  02-25   PT/INR - ( 25 Feb 2020 09:46 )   PT: 12.7 sec;   INR: 1.11 ratio         PTT - ( 25 Feb 2020 09:46 )  PTT:34.4 sec    PHYSICAL EXAM:  Gen: NAD  Skin: No rashes, bruises, or lesions  Head: Normocephalic, Atraumatic  Face: no edema, erythema, or fluctuance. Parotid glands soft without mass  Eyes: no scleral injection  Nose: Nares bilaterally patent, no discharge  Mouth: No Stridor / Drooling / Trismus.  Mucosa moist, tongue/uvula midline, oropharynx clear  Neck: Incision c/d/i, No hematoma or crepitus. Flat, supple, no lymphadenopathy, trachea midline, no masses  Lymphatic: No lymphadenopathy  Resp: breathing easily, no stridor  Neuro: facial nerve intact, no facial droop

## 2020-02-27 NOTE — PROGRESS NOTE ADULT - SUBJECTIVE AND OBJECTIVE BOX
Patient seen and examined at bedside.    --Anticoagulation--    T(C): 36.6 (02-27-20 @ 05:36), Max: 36.9 (02-26-20 @ 12:51)  HR: 96 (02-27-20 @ 05:36) (60 - 96)  BP: 122/72 (02-27-20 @ 05:36) (94/60 - 137/66)  RR: 16 (02-27-20 @ 05:36) (16 - 18)  SpO2: 100% (02-27-20 @ 05:36) (94% - 100%)  Wt(kg): --    Exam: AAOx3, FC, NELSON 5/5, mild discomfort but managed with pain medication

## 2020-02-27 NOTE — CHART NOTE - NSCHARTNOTEFT_GEN_A_CORE
Patient should wear collar at all times when out of bed    -TLSO brace  Please contact orthotist Timmy Hernandez for a fitted brace: [phone]

## 2020-02-27 NOTE — PROGRESS NOTE ADULT - ASSESSMENT
59 f with    s/p removal hardware Cervical spine  - ID evaluation/ observe off antibiotics  - Neurosurgical follow.  - ENT follow  - pain control    Dysphagia postop  - clears advance as tolerated     Possible esophageal fistula  - GI follow.  - EGD noted.   - esophagogram with no perforation    AICD (automatic cardioverter/defibrillator) present   - EP check    Arthritis   - pain control    Atrial Fibrillation paroxismal Dx in 08/2009  rate control    Back pain   - pain control    CAD (Coronary Artery Disease)   - continue Rx  - cardiology evaluation   - Hold ASA and Plavix for OR.    Hypercholesteremia   - continue Rx    PAD (peripheral artery disease) r leg 06  -stable    Lung opacities  - Pulmonary follow Dr. Gutierrez noted    d/w patient at length     Mega Zimmerman MD pager [phone]

## 2020-02-28 RX ORDER — OXYCODONE HYDROCHLORIDE 5 MG/1
30 TABLET ORAL EVERY 4 HOURS
Refills: 0 | Status: DISCONTINUED | OUTPATIENT
Start: 2020-02-28 | End: 2020-02-29

## 2020-02-28 RX ADMIN — PANTOPRAZOLE SODIUM 40 MILLIGRAM(S): 20 TABLET, DELAYED RELEASE ORAL at 05:55

## 2020-02-28 RX ADMIN — Medication 50 MILLIGRAM(S): at 05:55

## 2020-02-28 RX ADMIN — HYDROMORPHONE HYDROCHLORIDE 30 MILLILITER(S): 2 INJECTION INTRAMUSCULAR; INTRAVENOUS; SUBCUTANEOUS at 07:33

## 2020-02-28 RX ADMIN — LOSARTAN POTASSIUM 50 MILLIGRAM(S): 100 TABLET, FILM COATED ORAL at 17:17

## 2020-02-28 RX ADMIN — OXYCODONE HYDROCHLORIDE 30 MILLIGRAM(S): 5 TABLET ORAL at 20:50

## 2020-02-28 RX ADMIN — HYDROMORPHONE HYDROCHLORIDE 30 MILLILITER(S): 2 INJECTION INTRAMUSCULAR; INTRAVENOUS; SUBCUTANEOUS at 06:40

## 2020-02-28 RX ADMIN — HYDROMORPHONE HYDROCHLORIDE 30 MILLILITER(S): 2 INJECTION INTRAMUSCULAR; INTRAVENOUS; SUBCUTANEOUS at 10:27

## 2020-02-28 RX ADMIN — HYDROMORPHONE HYDROCHLORIDE 30 MILLILITER(S): 2 INJECTION INTRAMUSCULAR; INTRAVENOUS; SUBCUTANEOUS at 01:41

## 2020-02-28 RX ADMIN — SENNA PLUS 2 TABLET(S): 8.6 TABLET ORAL at 21:39

## 2020-02-28 RX ADMIN — LOSARTAN POTASSIUM 50 MILLIGRAM(S): 100 TABLET, FILM COATED ORAL at 05:55

## 2020-02-28 RX ADMIN — OXYCODONE HYDROCHLORIDE 30 MILLIGRAM(S): 5 TABLET ORAL at 15:00

## 2020-02-28 RX ADMIN — OXYCODONE HYDROCHLORIDE 30 MILLIGRAM(S): 5 TABLET ORAL at 20:17

## 2020-02-28 RX ADMIN — ATORVASTATIN CALCIUM 40 MILLIGRAM(S): 80 TABLET, FILM COATED ORAL at 21:39

## 2020-02-28 RX ADMIN — OXYCODONE HYDROCHLORIDE 10 MILLIGRAM(S): 5 TABLET ORAL at 23:22

## 2020-02-28 RX ADMIN — OXYCODONE HYDROCHLORIDE 10 MILLIGRAM(S): 5 TABLET ORAL at 13:00

## 2020-02-28 RX ADMIN — Medication 40 MILLIGRAM(S): at 05:55

## 2020-02-28 RX ADMIN — OXYCODONE HYDROCHLORIDE 30 MILLIGRAM(S): 5 TABLET ORAL at 14:24

## 2020-02-28 RX ADMIN — OXYCODONE HYDROCHLORIDE 10 MILLIGRAM(S): 5 TABLET ORAL at 12:11

## 2020-02-28 NOTE — PROGRESS NOTE ADULT - ASSESSMENT
59 f with    s/p removal hardware Cervical spine  - ID evaluation/ observe off antibiotics  - Neurosurgical follow.  - ENT follow  - pain control    Dysphagia postop  - clears advance as tolerated     Possible esophageal fistula  - GI follow.  - EGD noted.   - esophagogram with no perforation    AICD (automatic cardioverter/defibrillator) present   - EP check    Arthritis   - pain control    Atrial Fibrillation paroxismal Dx in 08/2009  rate control    Back pain   - pain control    CAD (Coronary Artery Disease)   - continue Rx  - cardiology follow   - Hold ASA and Plavix for OR. Restart when cleared by NeuroSx/ ENT.    Hypercholesteremia   - continue Rx    PAD (peripheral artery disease) r leg 06  -stable    Lung opacities  - Pulmonary follow Dr. Gutierrez noted    d/w patient at length     Mega Zimmerman MD pager [phone]

## 2020-02-28 NOTE — PROGRESS NOTE ADULT - SUBJECTIVE AND OBJECTIVE BOX
Patient is a 59y old  Female who presents with a chief complaint of abnormal CT C spine (28 Feb 2020 13:06)      SUBJECTIVE / OVERNIGHT EVENTS: No new complaints.  at bedside.  Review of Systems  chest pain no  palpitations no  sob no  nausea no  headache no    MEDICATIONS  (STANDING):  atorvastatin 40 milliGRAM(s) Oral at bedtime  furosemide    Tablet 40 milliGRAM(s) Oral daily  losartan 50 milliGRAM(s) Oral two times a day  metoprolol succinate ER 50 milliGRAM(s) Oral daily  oxyCODONE  ER Tablet 10 milliGRAM(s) Oral every 12 hours  pantoprazole    Tablet 40 milliGRAM(s) Oral before breakfast  polyethylene glycol 3350 17 Gram(s) Oral daily  senna 2 Tablet(s) Oral at bedtime    MEDICATIONS  (PRN):  calcium carbonate    500 mG (Tums) Chewable 1 Tablet(s) Chew three times a day PRN Indigestion  oxyCODONE    IR 30 milliGRAM(s) Oral every 4 hours PRN Moderate Pain (4 - 6)      Vital Signs Last 24 Hrs  T(C): 36.4 (28 Feb 2020 20:05), Max: 37.2 (28 Feb 2020 01:57)  T(F): 97.6 (28 Feb 2020 20:05), Max: 98.9 (28 Feb 2020 01:57)  HR: 77 (28 Feb 2020 20:05) (53 - 77)  BP: 134/64 (28 Feb 2020 20:05) (97/60 - 149/75)  BP(mean): --  RR: 18 (28 Feb 2020 20:05) (16 - 19)  SpO2: 93% (28 Feb 2020 20:05) (93% - 97%)    PHYSICAL EXAM:  GENERAL: NAD, well-developed  HEAD:  Atraumatic, Normocephalic  EYES: EOMI, PERRLA, conjunctiva and sclera clear  NECK: Supple, No JVD Cervical collar.  CHEST/LUNG: Clear to auscultation bilaterally; No wheeze  HEART: Regular rate and rhythm; No murmurs, rubs, or gallops  ABDOMEN: Soft, Nontender, Nondistended; Bowel sounds present  EXTREMITIES:  2+ Peripheral Pulses, No clubbing, cyanosis, or edema  PSYCH: AAOx3  NEUROLOGY: non-focal  SKIN: No rashes or lesions    LABS:                        10.1   4.86  )-----------( 269      ( 27 Feb 2020 08:16 )             30.8     02-27    143  |  102  |  9   ----------------------------<  85  4.2   |  30  |  0.71    Ca    9.7      27 Feb 2020 08:16                  RADIOLOGY & ADDITIONAL TESTS:    Imaging Personally Reviewed:    Consultant(s) Notes Reviewed:      Care Discussed with Consultants/Other Providers:

## 2020-02-28 NOTE — PHYSICAL THERAPY INITIAL EVALUATION ADULT - PERTINENT HX OF CURRENT PROBLEM, REHAB EVAL
59F PMHx CAD s/p 4vCABG, MI, AICD, stents x3 on ASA plavix, HLD, afib, back pain s/p C5-C6 ACDF (2002) and T10-P fusion, sent in my PMD after ENT scoped and ordered CT c-spine which shows hardware and fusion failure at C5 with air around hardware, adjacent to esophagus. She has had intermittent fevers for 4 months but otherwise no complaints. CT L shows fused T10-P construct. Pt s/p Removal of hardware (2/25).

## 2020-02-28 NOTE — PHYSICAL THERAPY INITIAL EVALUATION ADULT - ADDITIONAL COMMENTS
Pt lives with  in apartment with 4-6 steps to enter, no steps inside. Pt reports she is indep amb without AD, indep with all ADLs.

## 2020-02-28 NOTE — PROGRESS NOTE ADULT - SUBJECTIVE AND OBJECTIVE BOX
Day 3 of Anesthesia Pain Management Service    SUBJECTIVE: Doing well    Pain Scale Score:	[X] Refer to charted pain scores    THERAPY:    [ ] IV PCA Morphine		        [ ] 5 mg/mL	[ ] 1 mg/mL  [X] IV PCA Hydromorphone	[ ] 5 mg/mL	[X] 1 mg/mL  [ ] IV PCA Fentanyl		        [ ] 50 micrograms/mL    Demand dose: 0.3 mg     Lockout: 6 minutes   Continuous Rate: 0 mg/hr  4 Hour Limit: 4 mg    MEDICATIONS  (STANDING):  atorvastatin 40 milliGRAM(s) Oral at bedtime  furosemide    Tablet 40 milliGRAM(s) Oral daily  HYDROmorphone PCA (1 mG/mL) 30 milliLiter(s) PCA Continuous PCA Continuous  lactated ringers. 1000 milliLiter(s) (75 mL/Hr) IV Continuous <Continuous>  losartan 50 milliGRAM(s) Oral two times a day  metoprolol succinate ER 50 milliGRAM(s) Oral daily  oxyCODONE  ER Tablet 10 milliGRAM(s) Oral every 12 hours  pantoprazole    Tablet 40 milliGRAM(s) Oral before breakfast  polyethylene glycol 3350 17 Gram(s) Oral daily  senna 2 Tablet(s) Oral at bedtime    MEDICATIONS  (PRN):  calcium carbonate    500 mG (Tums) Chewable 1 Tablet(s) Chew three times a day PRN Indigestion  HYDROmorphone PCA (1 mG/mL) Rescue Clinician Bolus 0.5 milliGRAM(s) IV Push every 15 minutes PRN for Pain Scale GREATER THAN 6  naloxone Injectable 0.1 milliGRAM(s) IV Push every 3 minutes PRN For ANY of the following changes in patient status:  A. RR LESS THAN 10 breaths per minute, B. Oxygen saturation LESS THAN 90%, C. Sedation score of 6  ondansetron Injectable 4 milliGRAM(s) IV Push every 6 hours PRN Nausea      OBJECTIVE:    Sedation Score:	[ X] Alert	 [ ] Drowsy 	[ ] Arousable	[ ] Asleep	[ ] Unresponsive    Side Effects:	[X ] None	[ ] Nausea	[ ] Vomiting	[ ] Pruritus  		[ ] Other:    Vital Signs Last 24 Hrs  T(C): 36.7 (28 Feb 2020 09:17), Max: 37.2 (28 Feb 2020 01:57)  T(F): 98.1 (28 Feb 2020 09:17), Max: 98.9 (28 Feb 2020 01:57)  HR: 60 (28 Feb 2020 09:17) (53 - 66)  BP: 147/77 (28 Feb 2020 09:17) (97/60 - 147/77)  BP(mean): --  RR: 19 (28 Feb 2020 09:17) (16 - 19)  SpO2: 96% (28 Feb 2020 09:17) (93% - 97%)    ASSESSMENT/ PLAN    Therapy to  be:               [  ] Continued   [X ] Discontinued   [ X] Changed to PRN Analgesics    Documentation and Verification of current medications:   [X] Done	[ ] Not done, not eligible    Comments: OOB with PT earlier this am. D\C PCA. Transition to prn analgesics

## 2020-02-28 NOTE — PROGRESS NOTE ADULT - ASSESSMENT
Paula, Lela  59F PMHx CAD s/p 4vCABG, MI, AICD, stents x3 on ASA plavix, HLD, afib, back pain s/p C5-C6 ACDF (2002) and T10-P fusion, sent in my PMD after ENT scoped and ordered CT c-spine which shows hardware and fusion failure at C5 with air around hardware, adjacent to esophagus. She has had intermittent fevers for 4 months but otherwise no complaints. CT L shows fused T10-P construct. Exam: neurointact, denies pain.  - Esophogram normal yesterday  - Feeding tube removed  - Collar when OOB  - Timing to restart ASA/Plavix per ENT

## 2020-02-28 NOTE — PHYSICAL THERAPY INITIAL EVALUATION ADULT - REHAB POTENTIAL, PT EVAL
pt cleared to DC home from PT stand point. Pt would benefit from outpatient PT for gait and balance.

## 2020-02-28 NOTE — PROGRESS NOTE ADULT - ASSESSMENT
58yo female s/p anterior approach cervical hardware removal POD 3. Pt still c/o pain, however, controlled with pain meds. She is tolerating soft diet.

## 2020-02-28 NOTE — PROGRESS NOTE ADULT - SUBJECTIVE AND OBJECTIVE BOX
Patient evaluated measured and fitted for Divide J cervical orthosis  ordered by neurosurgery delivered by [facility] [phone]

## 2020-02-28 NOTE — PROGRESS NOTE ADULT - SUBJECTIVE AND OBJECTIVE BOX
Cardiovascular Disease Progress Note    Overnight events: No acute events overnight.  no cp/sob/palps/dizziness  Otherwise review of systems negative    Objective Findings:  T(C): 36.7 (02-28-20 @ 05:23), Max: 37.2 (02-27-20 @ 09:00)  HR: 58 (02-28-20 @ 05:23) (53 - 69)  BP: 109/64 (02-28-20 @ 05:23) (97/60 - 147/65)  RR: 16 (02-28-20 @ 05:23) (16 - 16)  SpO2: 97% (02-28-20 @ 05:23) (93% - 98%)  Wt(kg): --  Daily     Daily       Physical Exam:  Gen: NAD  HEENT: EOMI  CV: RRR, normal S1 + S2, no m/r/g  Lungs: CTAB  Abd: soft, non-tender  Ext: No edema      Laboratory Data:                        10.1   4.86  )-----------( 269      ( 27 Feb 2020 08:16 )             30.8     02-27    143  |  102  |  9   ----------------------------<  85  4.2   |  30  |  0.71    Ca    9.7      27 Feb 2020 08:16                Inpatient Medications:  MEDICATIONS  (STANDING):  atorvastatin 40 milliGRAM(s) Oral at bedtime  furosemide    Tablet 40 milliGRAM(s) Oral daily  HYDROmorphone PCA (1 mG/mL) 30 milliLiter(s) PCA Continuous PCA Continuous  lactated ringers. 1000 milliLiter(s) (75 mL/Hr) IV Continuous <Continuous>  losartan 50 milliGRAM(s) Oral two times a day  metoprolol succinate ER 50 milliGRAM(s) Oral daily  oxyCODONE  ER Tablet 10 milliGRAM(s) Oral every 12 hours  pantoprazole    Tablet 40 milliGRAM(s) Oral before breakfast  polyethylene glycol 3350 17 Gram(s) Oral daily  senna 2 Tablet(s) Oral at bedtime      Assessment:  -CAD s/p 4vCABG and PCI  -ICM s/p AICD   -HLD  -pAFib  -chronic back pain s/p T10-L5 posterior spinal fusion for lumbar disc disease with radiculopathy   -? C spine with gas, implying erosion into esophagus or gas forming bacteria around the screw    Recs:  -c/w metop and losartan for gdmt for lv dysfunction and anti-anginal/anti-arrhythmic effect.  -no postop cardiac events noted  -c/w lasix 40mg to maintain euvolemic  -management of APA per surgery. resume as tolerates  -hx of pAF not on ac, remains on high dose asa and plavix  -multispecialty services appreciated - neuro, gi, pulm and ent        Over 25 minutes spent on total encounter; more than 50% of the visit was spent counseling and/or coordinating care by the attending physician.      Ministerio Dupont MD   Cardiovascular Disease  [phone]

## 2020-02-28 NOTE — PHYSICAL THERAPY INITIAL EVALUATION ADULT - ASR WT BEARING STATUS EVAL
CONTINUED- CT NECK SOFT TISSUE w/ CON (2/14): No significant interval change in appearance of fractured right C5 vertebral body screw. The fractured screw fragment is embedded in the right prevertebral soft tissues. Adjacent to this, there is a similar appearing focus of retropharyngeal air asymmetric to the right at the C5 and C6 levels. This could represent the presence of esophageal injury.Previously seen 7 mm nodule along the superior aspect of the scarring appears slightly increased in size, currently measuring 8 mm in greatest dimension. Correlation with CT chest is recommended for further evaluation.; CT NECK SOFT TISSUE w/o CON (2/20): Redemonstration of fractured screw fragment dated in the right prevertebral soft tissues at the C5-C6 level. Redemonstration of a focus of retropharyngeal air adjacent to the fractured screw fragment.  The focus of air is minimally smaller, currently measuring currently measuring 0.4 x 1.8 x 1.3 cm, previously measuring 0.6 x 1.8 x 1.3 cm. New aerated secretions within in the collection of air.; XRAY ESOPHAGRAM (2/26):  Aspiration noted on exam.  No leakage of contrast identified. Tertiary contractions, intraesophageal reflux.; UPPER ENDOSCOPY: Normal esophagus, with no evidence of esophageal tear, foreign body or other abnormality.; CHEST CT:  Patchy groundglass opacities in the right upper lobe, right middle lobe, and the right lower lobe are new since prior exam. These findings are nonspecific. Recommend follow-up in 6 - 8 weeks.; CXR (2/25): Clear lungs no weight-bearing restrictions/CONTINUED- CT NECK SOFT TISSUE w/ CON (2/14): No significant interval change in appearance of fractured right C5 vertebral body screw. The fractured screw fragment is embedded in the right prevertebral soft tissues. Adjacent to this, there is a similar appearing focus of retropharyngeal air asymmetric to the right at the C5 and C6 levels. This could represent the presence of esophageal injury.Previously seen 7 mm nodule along the superior aspect of the scarring appears slightly increased in size, currently measuring 8 mm in greatest dimension. Correlation with CT chest is recommended for further evaluation.; CT NECK SOFT TISSUE w/o CON (2/20): Redemonstration of fractured screw fragment dated in the right prevertebral soft tissues at the C5-C6 level. Redemonstration of a focus of retropharyngeal air adjacent to the fractured screw fragment.  The focus of air is minimally smaller, currently measuring currently measuring 0.4 x 1.8 x 1.3 cm, previously measuring 0.6 x 1.8 x 1.3 cm. New aerated secretions within in the collection of air.; XRAY ESOPHAGRAM (2/26):  Aspiration noted on exam.  No leakage of contrast identified. Tertiary contractions, intraesophageal reflux.; UPPER ENDOSCOPY: Normal esophagus, with no evidence of esophageal tear, foreign body or other abnormality.; CHEST CT:  Patchy groundglass opacities in the right upper lobe, right middle lobe, and the right lower lobe are new since prior exam. These findings are nonspecific. Recommend follow-up in 6 - 8 weeks.; CXR (2/25): Clear lungs

## 2020-02-28 NOTE — PROGRESS NOTE ADULT - SUBJECTIVE AND OBJECTIVE BOX
ENT ISSUE/POD: s/p anterior approach cervical hardware removal POD 3    HPI: 60yo female s/p anterior approach cervical hardware removal POD 3. Pt seen and examined at bedside. No acute events overnight. She states improvement of pain with pain meds, however still c/o pain when meds wear off. She is able to tolerate soft diet. Pt denies fever, chills, HA, SOB, dysphagia, hemoptysis.         PAST MEDICAL & SURGICAL HISTORY:  AICD (automatic cardioverter/defibrillator) present  Pseudoarthrosis of lumbar spine  Stented coronary artery: x 3 ESTEVAN  Degenerative disc disease, lumbar  PAD (peripheral artery disease): r leg 06  Hypercholesteremia  Reflux  Back pain  MI (myocardial infarction)  Arthritis  CAD (Coronary Artery Disease): CARDIAC CATH PTCA/[place] x 3 to SVG to RCA 08/2009  Atrial Fibrillation: paroxismal Dx in 08/2009  Elective surgery: Left leg &quot;clean out&quot; secondary PAD  H/O cervical spine surgery  History of back surgery: T10 - L4 posterior fusion  Elective surgery: Right leg art bypass 2006  S/P lumbar spine operation: laminectomy  Elective surgery: AICD 2006  S/P coronary artery stent placement: x 3  - 2010  S/P coronary artery bypass graft x 4: 2006    Allergies    No Known Allergies    Intolerances    adhesives (Rash; Urticaria)    MEDICATIONS  (STANDING):  atorvastatin 40 milliGRAM(s) Oral at bedtime  furosemide    Tablet 40 milliGRAM(s) Oral daily  HYDROmorphone PCA (1 mG/mL) 30 milliLiter(s) PCA Continuous PCA Continuous  lactated ringers. 1000 milliLiter(s) (75 mL/Hr) IV Continuous <Continuous>  losartan 50 milliGRAM(s) Oral two times a day  metoprolol succinate ER 50 milliGRAM(s) Oral daily  oxyCODONE  ER Tablet 10 milliGRAM(s) Oral every 12 hours  pantoprazole    Tablet 40 milliGRAM(s) Oral before breakfast  polyethylene glycol 3350 17 Gram(s) Oral daily  senna 2 Tablet(s) Oral at bedtime    MEDICATIONS  (PRN):  calcium carbonate    500 mG (Tums) Chewable 1 Tablet(s) Chew three times a day PRN Indigestion  HYDROmorphone PCA (1 mG/mL) Rescue Clinician Bolus 0.5 milliGRAM(s) IV Push every 15 minutes PRN for Pain Scale GREATER THAN 6  naloxone Injectable 0.1 milliGRAM(s) IV Push every 3 minutes PRN For ANY of the following changes in patient status:  A. RR LESS THAN 10 breaths per minute, B. Oxygen saturation LESS THAN 90%, C. Sedation score of 6  ondansetron Injectable 4 milliGRAM(s) IV Push every 6 hours PRN Nausea      Social History: see consult note    Family history: see consult note    ROS:   ENT: all negative except as noted in HPI   Pulm: denies SOB, cough, hemoptysis  Neuro: denies numbness/tingling, loss of sensation  Endo: denies heat/cold intolerance, excessive sweating      Vital Signs Last 24 Hrs  T(C): 36.7 (28 Feb 2020 09:17), Max: 37.2 (28 Feb 2020 01:57)  T(F): 98.1 (28 Feb 2020 09:17), Max: 98.9 (28 Feb 2020 01:57)  HR: 60 (28 Feb 2020 09:17) (53 - 66)  BP: 147/77 (28 Feb 2020 09:17) (97/60 - 147/77)  BP(mean): --  RR: 19 (28 Feb 2020 09:17) (16 - 19)  SpO2: 96% (28 Feb 2020 09:17) (93% - 97%)                          10.1   4.86  )-----------( 269      ( 27 Feb 2020 08:16 )             30.8    02-27    143  |  102  |  9   ----------------------------<  85  4.2   |  30  |  0.71    Ca    9.7      27 Feb 2020 08:16         PHYSICAL EXAM:  Gen: NAD  Skin: No rashes, bruises, or lesions  Head: Normocephalic, Atraumatic  Face: no edema, erythema, or fluctuance. Parotid glands soft without mass  Eyes: no scleral injection  Nose: Nares bilaterally patent, no discharge  Mouth: No Stridor / Drooling / Trismus.  Mucosa moist, tongue/uvula midline, oropharynx clear  Neck: dressing in place, Incision site c/d/i, No hematoma or crepitus. Flat, supple, no lymphadenopathy, trachea midline, no masses  Lymphatic: No lymphadenopathy  Resp: breathing easily, no stridor  Neuro: facial nerve intact, no facial droop

## 2020-02-28 NOTE — PROGRESS NOTE ADULT - SUBJECTIVE AND OBJECTIVE BOX
Patient seen and examined at bedside.    --Anticoagulation--    T(C): 36.7 (02-28-20 @ 05:23), Max: 37.2 (02-27-20 @ 09:00)  HR: 58 (02-28-20 @ 05:23) (53 - 69)  BP: 109/64 (02-28-20 @ 05:23) (97/60 - 147/65)  RR: 16 (02-28-20 @ 05:23) (16 - 16)  SpO2: 97% (02-28-20 @ 05:23) (93% - 98%)  Wt(kg): --    Exam: AAOx3, FC, NELSON 5/5, mild discomfort but managed with pain medication

## 2020-02-28 NOTE — PHYSICAL THERAPY INITIAL EVALUATION ADULT - MANUAL MUSCLE TESTING RESULTS, REHAB EVAL
Strength is grossly at least 3+/5 throughout. BUE not tested 2/2 spinal precautions/grossly assessed due to

## 2020-02-29 ENCOUNTER — TRANSCRIPTION ENCOUNTER (OUTPATIENT)
Age: 60
End: 2020-02-29

## 2020-02-29 VITALS — DIASTOLIC BLOOD PRESSURE: 60 MMHG | HEART RATE: 67 BPM | SYSTOLIC BLOOD PRESSURE: 98 MMHG

## 2020-02-29 LAB
ANION GAP SERPL CALC-SCNC: 14 MMOL/L — SIGNIFICANT CHANGE UP (ref 5–17)
BUN SERPL-MCNC: 5 MG/DL — LOW (ref 7–23)
CALCIUM SERPL-MCNC: 9.5 MG/DL — SIGNIFICANT CHANGE UP (ref 8.4–10.5)
CHLORIDE SERPL-SCNC: 99 MMOL/L — SIGNIFICANT CHANGE UP (ref 96–108)
CO2 SERPL-SCNC: 27 MMOL/L — SIGNIFICANT CHANGE UP (ref 22–31)
CREAT SERPL-MCNC: 0.68 MG/DL — SIGNIFICANT CHANGE UP (ref 0.5–1.3)
GLUCOSE SERPL-MCNC: 102 MG/DL — HIGH (ref 70–99)
HCT VFR BLD CALC: 28.5 % — LOW (ref 34.5–45)
HGB BLD-MCNC: 9.5 G/DL — LOW (ref 11.5–15.5)
MCHC RBC-ENTMCNC: 33.3 GM/DL — SIGNIFICANT CHANGE UP (ref 32–36)
MCHC RBC-ENTMCNC: 33.9 PG — SIGNIFICANT CHANGE UP (ref 27–34)
MCV RBC AUTO: 101.8 FL — HIGH (ref 80–100)
NRBC # BLD: 0 /100 WBCS — SIGNIFICANT CHANGE UP (ref 0–0)
PLATELET # BLD AUTO: 268 K/UL — SIGNIFICANT CHANGE UP (ref 150–400)
POTASSIUM SERPL-MCNC: 3.3 MMOL/L — LOW (ref 3.5–5.3)
POTASSIUM SERPL-SCNC: 3.3 MMOL/L — LOW (ref 3.5–5.3)
RBC # BLD: 2.8 M/UL — LOW (ref 3.8–5.2)
RBC # FLD: 12.2 % — SIGNIFICANT CHANGE UP (ref 10.3–14.5)
SODIUM SERPL-SCNC: 140 MMOL/L — SIGNIFICANT CHANGE UP (ref 135–145)
WBC # BLD: 4.02 K/UL — SIGNIFICANT CHANGE UP (ref 3.8–10.5)
WBC # FLD AUTO: 4.02 K/UL — SIGNIFICANT CHANGE UP (ref 3.8–10.5)

## 2020-02-29 PROCEDURE — 86900 BLOOD TYPING SEROLOGIC ABO: CPT

## 2020-02-29 PROCEDURE — 84145 PROCALCITONIN (PCT): CPT

## 2020-02-29 PROCEDURE — 85014 HEMATOCRIT: CPT

## 2020-02-29 PROCEDURE — 71250 CT THORAX DX C-: CPT

## 2020-02-29 PROCEDURE — 93005 ELECTROCARDIOGRAM TRACING: CPT

## 2020-02-29 PROCEDURE — 80048 BASIC METABOLIC PNL TOTAL CA: CPT

## 2020-02-29 PROCEDURE — 84295 ASSAY OF SERUM SODIUM: CPT

## 2020-02-29 PROCEDURE — 86140 C-REACTIVE PROTEIN: CPT

## 2020-02-29 PROCEDURE — 70491 CT SOFT TISSUE NECK W/DYE: CPT

## 2020-02-29 PROCEDURE — 87040 BLOOD CULTURE FOR BACTERIA: CPT

## 2020-02-29 PROCEDURE — 83605 ASSAY OF LACTIC ACID: CPT

## 2020-02-29 PROCEDURE — 86901 BLOOD TYPING SEROLOGIC RH(D): CPT

## 2020-02-29 PROCEDURE — 70490 CT SOFT TISSUE NECK W/O DYE: CPT

## 2020-02-29 PROCEDURE — 84132 ASSAY OF SERUM POTASSIUM: CPT

## 2020-02-29 PROCEDURE — 97161 PT EVAL LOW COMPLEX 20 MIN: CPT

## 2020-02-29 PROCEDURE — 71045 X-RAY EXAM CHEST 1 VIEW: CPT

## 2020-02-29 PROCEDURE — 82803 BLOOD GASES ANY COMBINATION: CPT

## 2020-02-29 PROCEDURE — 74220 X-RAY XM ESOPHAGUS 1CNTRST: CPT

## 2020-02-29 PROCEDURE — 85730 THROMBOPLASTIN TIME PARTIAL: CPT

## 2020-02-29 PROCEDURE — 87086 URINE CULTURE/COLONY COUNT: CPT

## 2020-02-29 PROCEDURE — 86850 RBC ANTIBODY SCREEN: CPT

## 2020-02-29 PROCEDURE — 99231 SBSQ HOSP IP/OBS SF/LOW 25: CPT

## 2020-02-29 PROCEDURE — 81001 URINALYSIS AUTO W/SCOPE: CPT

## 2020-02-29 PROCEDURE — 82947 ASSAY GLUCOSE BLOOD QUANT: CPT

## 2020-02-29 PROCEDURE — 85027 COMPLETE CBC AUTOMATED: CPT

## 2020-02-29 PROCEDURE — 85652 RBC SED RATE AUTOMATED: CPT

## 2020-02-29 PROCEDURE — 82330 ASSAY OF CALCIUM: CPT

## 2020-02-29 PROCEDURE — 86803 HEPATITIS C AB TEST: CPT

## 2020-02-29 PROCEDURE — 85610 PROTHROMBIN TIME: CPT

## 2020-02-29 PROCEDURE — 80053 COMPREHEN METABOLIC PANEL: CPT

## 2020-02-29 PROCEDURE — 82435 ASSAY OF BLOOD CHLORIDE: CPT

## 2020-02-29 PROCEDURE — 99285 EMERGENCY DEPT VISIT HI MDM: CPT

## 2020-02-29 PROCEDURE — 87631 RESP VIRUS 3-5 TARGETS: CPT

## 2020-02-29 PROCEDURE — 85576 BLOOD PLATELET AGGREGATION: CPT

## 2020-02-29 PROCEDURE — 88300 SURGICAL PATH GROSS: CPT

## 2020-02-29 RX ORDER — OXYCODONE HYDROCHLORIDE 5 MG/1
1 TABLET ORAL
Qty: 30 | Refills: 0
Start: 2020-02-29 | End: 2020-03-04

## 2020-02-29 RX ADMIN — OXYCODONE HYDROCHLORIDE 10 MILLIGRAM(S): 5 TABLET ORAL at 11:42

## 2020-02-29 RX ADMIN — OXYCODONE HYDROCHLORIDE 10 MILLIGRAM(S): 5 TABLET ORAL at 00:00

## 2020-02-29 RX ADMIN — LOSARTAN POTASSIUM 50 MILLIGRAM(S): 100 TABLET, FILM COATED ORAL at 05:34

## 2020-02-29 RX ADMIN — OXYCODONE HYDROCHLORIDE 30 MILLIGRAM(S): 5 TABLET ORAL at 04:49

## 2020-02-29 RX ADMIN — PANTOPRAZOLE SODIUM 40 MILLIGRAM(S): 20 TABLET, DELAYED RELEASE ORAL at 05:33

## 2020-02-29 RX ADMIN — Medication 50 MILLIGRAM(S): at 05:33

## 2020-02-29 RX ADMIN — OXYCODONE HYDROCHLORIDE 10 MILLIGRAM(S): 5 TABLET ORAL at 11:12

## 2020-02-29 RX ADMIN — OXYCODONE HYDROCHLORIDE 30 MILLIGRAM(S): 5 TABLET ORAL at 10:00

## 2020-02-29 RX ADMIN — OXYCODONE HYDROCHLORIDE 30 MILLIGRAM(S): 5 TABLET ORAL at 05:20

## 2020-02-29 RX ADMIN — OXYCODONE HYDROCHLORIDE 30 MILLIGRAM(S): 5 TABLET ORAL at 09:30

## 2020-02-29 RX ADMIN — Medication 40 MILLIGRAM(S): at 05:33

## 2020-02-29 RX ADMIN — OXYCODONE HYDROCHLORIDE 30 MILLIGRAM(S): 5 TABLET ORAL at 16:51

## 2020-02-29 RX ADMIN — OXYCODONE HYDROCHLORIDE 30 MILLIGRAM(S): 5 TABLET ORAL at 17:20

## 2020-02-29 NOTE — PROGRESS NOTE ADULT - SUBJECTIVE AND OBJECTIVE BOX
Patient is a 59y old  Female who presents with a chief complaint of abnormal CT C spine (29 Feb 2020 06:44)      SUBJECTIVE / OVERNIGHT EVENTS: Comfortable without new complaints. Wants to go home.  Review of Systems  chest pain no  palpitations no  sob no  nausea no  headache no    MEDICATIONS  (STANDING):  atorvastatin 40 milliGRAM(s) Oral at bedtime  furosemide    Tablet 40 milliGRAM(s) Oral daily  losartan 50 milliGRAM(s) Oral two times a day  metoprolol succinate ER 50 milliGRAM(s) Oral daily  oxyCODONE  ER Tablet 10 milliGRAM(s) Oral every 12 hours  pantoprazole    Tablet 40 milliGRAM(s) Oral before breakfast  polyethylene glycol 3350 17 Gram(s) Oral daily  senna 2 Tablet(s) Oral at bedtime    MEDICATIONS  (PRN):  calcium carbonate    500 mG (Tums) Chewable 1 Tablet(s) Chew three times a day PRN Indigestion  oxyCODONE    IR 30 milliGRAM(s) Oral every 4 hours PRN Moderate Pain (4 - 6)      Vital Signs Last 24 Hrs  T(C): 37.1 (29 Feb 2020 12:34), Max: 37.1 (29 Feb 2020 12:34)  T(F): 98.7 (29 Feb 2020 12:34), Max: 98.7 (29 Feb 2020 12:34)  HR: 67 (29 Feb 2020 12:34) (55 - 77)  BP: 107/67 (29 Feb 2020 12:34) (107/67 - 142/57)  BP(mean): --  RR: 15 (29 Feb 2020 12:34) (15 - 18)  SpO2: 95% (29 Feb 2020 12:34) (93% - 97%)    PHYSICAL EXAM:  GENERAL: NAD, well-developed  HEAD:  Atraumatic, Normocephalic  EYES: EOMI, PERRLA, conjunctiva and sclera clear  NECK: Supple, No JVD Incision healing.  CHEST/LUNG: Clear to auscultation bilaterally; No wheeze  HEART: Regular rate and rhythm; No murmurs, rubs, or gallops  ABDOMEN: Soft, Nontender, Nondistended; Bowel sounds present  EXTREMITIES:  2+ Peripheral Pulses, No clubbing, cyanosis, or edema  PSYCH: AAOx3  NEUROLOGY: non-focal  SKIN: No rashes or lesions    LABS:                        9.5    4.02  )-----------( 268      ( 29 Feb 2020 06:57 )             28.5     02-29    140  |  99  |  5<L>  ----------------------------<  102<H>  3.3<L>   |  27  |  0.68    Ca    9.5      29 Feb 2020 07:01                  RADIOLOGY & ADDITIONAL TESTS:    Imaging Personally Reviewed:    Consultant(s) Notes Reviewed:      Care Discussed with Consultants/Other Providers:

## 2020-02-29 NOTE — PROGRESS NOTE ADULT - SUBJECTIVE AND OBJECTIVE BOX
Cardiovascular Disease Progress Note    Overnight events: No acute events overnight.  d/c plannng no new cardiac sx  Otherwise review of systems negative    Objective Findings:  T(C): 36.6 (02-29-20 @ 05:23), Max: 36.8 (02-28-20 @ 09:16)  HR: 55 (02-29-20 @ 05:23) (55 - 77)  BP: 136/71 (02-29-20 @ 05:23) (116/75 - 149/75)  RR: 15 (02-29-20 @ 05:23) (15 - 19)  SpO2: 97% (02-29-20 @ 05:23) (93% - 97%)  Wt(kg): --  Daily     Daily       Physical Exam:  Gen: NAD  HEENT: EOMI  CV: RRR, normal S1 + S2, no m/r/g  Lungs: CTAB  Abd: soft, non-tender  Ext: No edema    Telemetry:    Laboratory Data:                        10.1   4.86  )-----------( 269      ( 27 Feb 2020 08:16 )             30.8     02-27    143  |  102  |  9   ----------------------------<  85  4.2   |  30  |  0.71    Ca    9.7      27 Feb 2020 08:16                Inpatient Medications:  MEDICATIONS  (STANDING):  atorvastatin 40 milliGRAM(s) Oral at bedtime  furosemide    Tablet 40 milliGRAM(s) Oral daily  losartan 50 milliGRAM(s) Oral two times a day  metoprolol succinate ER 50 milliGRAM(s) Oral daily  oxyCODONE  ER Tablet 10 milliGRAM(s) Oral every 12 hours  pantoprazole    Tablet 40 milliGRAM(s) Oral before breakfast  polyethylene glycol 3350 17 Gram(s) Oral daily  senna 2 Tablet(s) Oral at bedtime      Assessment:  -CAD s/p 4vCABG and PCI  -ICM s/p AICD   -HLD  -pAFib  -chronic back pain s/p T10-L5 posterior spinal fusion for lumbar disc disease with radiculopathy   -? C spine with gas, implying erosion into esophagus or gas forming bacteria around the screw    Recs:  -c/w metop and losartan for gdmt for lv dysfunction and anti-anginal/anti-arrhythmic effect.  -no postop cardiac events noted  -c/w lasix 40mg to maintain euvolemic  -management of APA per surgery. resume as tolerates (ideally prior to discharge)  -hx of pAF not on ac, remains on high dose asa and plavix  -multispecialty services appreciated - neuro, gi, pulm and ent        Over 25 minutes spent on total encounter; more than 50% of the visit was spent counseling and/or coordinating care by the attending physician.      Ministerio Dupont MD   Cardiovascular Disease  [phone]

## 2020-02-29 NOTE — PROGRESS NOTE ADULT - PROBLEM SELECTOR PLAN 1
- 2/14 Esophagram negative for leak   - Pt tolerating full liquid diet   - Keep NPO p MN for EGD evaluation of cervical esophagus with GI Tuesday  - ID following recommending monitoring off abx for now, would trend WBC on daily CBC  - No acute thoracic surgery intervention indicated at this time  - Continue care as per primary team
- 2/14 Esophagram negative for leak   - Pt tolerating full liquid diet   - Keep NPO p MN for EGD evaluation of cervical esophagus with GI today  - ID following recommending monitoring off abx for now, would trend WBC on daily CBC  - No acute thoracic surgery intervention indicated at this time  - Continue care as per primary team  -Signing off, Reconsult PRN
- Please consult Ortho spine - Dr. Boris Ivey for hardware removal  - F/u GI recs  - OK to continue with Full liquid diet.   - ENT to continue to follow.
- Start soft diet   - Pain control prn  - Monitor for neck crepitus or hematoma and voice changes  - Monitor vitals  - Call with questions or concerns
- continue soft diet   - Pain control prn  - Monitor for neck crepitus or hematoma and voice changes  - Call with questions or concerns x [phone].
- continue soft diet   - Pain control prn  - Monitor for neck crepitus or hematoma and voice changes  - Call with questions or concerns x [phone]
- continue to monitor lamberto drain output   - pain control   - monitor for neck crepitus or hematoma and voice changes    - npo until esophogram in am.   - vitals prn   - cxr for confirmation fo ng tube placement  - call ent prn
-BOOKED FOR SURGERY 2/25 at 2:30 pm with Dr. Steward ENT and Dr. Eden neurosurge  -will need cardiac clearance  -Pace maker interrogation documents coming thru fax on [employer]  -Consent obtained from patient  -NPO after midnight tonight  -Will need updated labs, T&S, CXR and EKG
-Would not DC patient given minutely changed free air around cervical spine hardware  -Will discuss with neurosurgery for plan for removal of hardware
Esophogram negative for leak   Recommend that GI perform a diagnostic endoscopy to evaluate the cervical esophagus  consider empiric antibiotics however ID following recommending monitoring off abx for now  EGD Tuesday with GI  Care as per primary team
-Start clears  - pain control   - monitor for neck crepitus or hematoma and voice changes    - npo until esophogram in am.   - vitals prn

## 2020-02-29 NOTE — DISCHARGE NOTE NURSING/CASE MANAGEMENT/SOCIAL WORK - PATIENT PORTAL LINK FT
You can access the FollowMyHealth Patient Portal offered by [facility] by registering at the following website: [email or web address]. By joining [facility]’s FollowMyHealth portal, you will also be able to view your health information using other applications (apps) compatible with our system.

## 2020-02-29 NOTE — PROGRESS NOTE ADULT - SUBJECTIVE AND OBJECTIVE BOX
ENT ISSUE/POD: s/p anterior approach cervical hardware removal POD 4    HPI: 58yo female s/p anterior approach cervical hardware removal POD 4. Pt seen and examined at bedside. No acute events overnight. She is able to tolerate soft diet. Pt denies fever, chills, HA, SOB, dysphagia, hemoptysis.       PAST MEDICAL & SURGICAL HISTORY:  AICD (automatic cardioverter/defibrillator) present  Pseudoarthrosis of lumbar spine  Stented coronary artery: x 3 ESTEVAN  Degenerative disc disease, lumbar  PAD (peripheral artery disease): r leg 06  Hypercholesteremia  Reflux  Back pain  MI (myocardial infarction)  Arthritis  CAD (Coronary Artery Disease): CARDIAC CATH PTCA/[place] x 3 to SVG to RCA 08/2009  Atrial Fibrillation: paroxismal Dx in 08/2009  Elective surgery: Left leg &quot;clean out&quot; secondary PAD  H/O cervical spine surgery  History of back surgery: T10 - L4 posterior fusion  Elective surgery: Right leg art bypass 2006  S/P lumbar spine operation: laminectomy  Elective surgery: AICD 2006  S/P coronary artery stent placement: x 3  - 2010  S/P coronary artery bypass graft x 4: 2006    Allergies    No Known Allergies    Intolerances    adhesives (Rash; Urticaria)    MEDICATIONS  (STANDING):  atorvastatin 40 milliGRAM(s) Oral at bedtime  furosemide    Tablet 40 milliGRAM(s) Oral daily  losartan 50 milliGRAM(s) Oral two times a day  metoprolol succinate ER 50 milliGRAM(s) Oral daily  oxyCODONE  ER Tablet 10 milliGRAM(s) Oral every 12 hours  pantoprazole    Tablet 40 milliGRAM(s) Oral before breakfast  polyethylene glycol 3350 17 Gram(s) Oral daily  senna 2 Tablet(s) Oral at bedtime    MEDICATIONS  (PRN):  calcium carbonate    500 mG (Tums) Chewable 1 Tablet(s) Chew three times a day PRN Indigestion  oxyCODONE    IR 30 milliGRAM(s) Oral every 4 hours PRN Moderate Pain (4 - 6)    social history: see consult     family history: see consult     ROS:   ENT: all negative except as noted in HPI   Pulm: denies SOB, cough, hemoptysis  Neuro: denies numbness/tingling, loss of sensation  Endo: denies heat/cold intolerance, excessive sweating      Vital Signs Last 24 Hrs  T(C): 36.6 (29 Feb 2020 05:23), Max: 36.8 (28 Feb 2020 09:16)  T(F): 97.8 (29 Feb 2020 05:23), Max: 98.3 (29 Feb 2020 00:25)  HR: 55 (29 Feb 2020 05:23) (55 - 77)  BP: 136/71 (29 Feb 2020 05:23) (116/75 - 149/75)  BP(mean): --  RR: 15 (29 Feb 2020 05:23) (15 - 19)  SpO2: 97% (29 Feb 2020 05:23) (93% - 97%)                          10.1   4.86  )-----------( 269      ( 27 Feb 2020 08:16 )             30.8    02-27    143  |  102  |  9   ----------------------------<  85  4.2   |  30  |  0.71    Ca    9.7      27 Feb 2020 08:16         PHYSICAL EXAM:  Gen: NAD  Skin: No rashes, bruises, or lesions  Head: Normocephalic, Atraumatic  Face: no edema, erythema, or fluctuance. Parotid glands soft without mass  Eyes: no scleral injection  Nose: Nares bilaterally patent, no discharge  Mouth: No Stridor / Drooling / Trismus.  Mucosa moist, tongue/uvula midline, oropharynx clear  Neck: dressing in place, Incision site c/d/i, No hematoma or crepitus. Flat, supple, no lymphadenopathy, trachea midline, no masses  Lymphatic: No lymphadenopathy  Resp: breathing easily, no stridor  Neuro: facial nerve intact, no facial droop

## 2020-02-29 NOTE — PROGRESS NOTE ADULT - PROBLEM SELECTOR PROBLEM 1
Esophagus disorder
Neck pain
Esophagus disorder

## 2020-02-29 NOTE — PROGRESS NOTE ADULT - REASON FOR ADMISSION
abnormal CT C spine

## 2020-02-29 NOTE — PROGRESS NOTE ADULT - ASSESSMENT
59 f with    s/p removal hardware Cervical spine  - ID evaluation/ observe off antibiotics  - Neurosurgical follow.  - ENT follow. Cleared to restart ASA and Plavix.  - pain control    Dysphagia postop improving   - clears advance as tolerated     Possible esophageal fistula  - GI follow.  - EGD noted.   - esophagogram with no perforation    AICD (automatic cardioverter/defibrillator) present   - EP check    Arthritis   - pain control    Atrial Fibrillation paroxismal Dx in 08/2009  rate control    Back pain   - pain control    CAD (Coronary Artery Disease)   - continue Rx  - cardiology follow   - Hold ASA and Plavix for OR. Restart when cleared by NeuroSx/ ENT.    Hypercholesteremia   - continue Rx    PAD (peripheral artery disease) r leg 06  -stable    Lung opacities  - Pulmonary follow Dr. Gutierrez noted    d/w patient at length     Mega Zimmerman MD pager [phone]

## 2020-03-26 ENCOUNTER — APPOINTMENT (OUTPATIENT)
Dept: CT IMAGING | Facility: IMAGING CENTER | Age: 60
End: 2020-03-26
Payer: MEDICARE

## 2020-03-26 ENCOUNTER — OUTPATIENT (OUTPATIENT)
Dept: OUTPATIENT SERVICES | Facility: HOSPITAL | Age: 60
LOS: 1 days | End: 2020-03-26
Payer: MEDICARE

## 2020-03-26 DIAGNOSIS — R91.8 OTHER NONSPECIFIC ABNORMAL FINDING OF LUNG FIELD: ICD-10-CM

## 2020-03-26 DIAGNOSIS — Z41.9 ENCOUNTER FOR PROCEDURE FOR PURPOSES OTHER THAN REMEDYING HEALTH STATE, UNSPECIFIED: Chronic | ICD-10-CM

## 2020-03-26 PROCEDURE — 71250 CT THORAX DX C-: CPT

## 2020-03-26 PROCEDURE — 71250 CT THORAX DX C-: CPT | Mod: 26

## 2020-03-31 ENCOUNTER — APPOINTMENT (OUTPATIENT)
Dept: PULMONOLOGY | Facility: CLINIC | Age: 60
End: 2020-03-31
Payer: MEDICARE

## 2020-03-31 VITALS
HEIGHT: 60 IN | SYSTOLIC BLOOD PRESSURE: 154 MMHG | WEIGHT: 120 LBS | OXYGEN SATURATION: 96 % | TEMPERATURE: 98.8 F | DIASTOLIC BLOOD PRESSURE: 44 MMHG | HEART RATE: 44 BPM | BODY MASS INDEX: 23.56 KG/M2

## 2020-03-31 DIAGNOSIS — R91.1 SOLITARY PULMONARY NODULE: ICD-10-CM

## 2020-03-31 DIAGNOSIS — R06.02 SHORTNESS OF BREATH: ICD-10-CM

## 2020-03-31 PROCEDURE — 99213 OFFICE O/P EST LOW 20 MIN: CPT

## 2020-03-31 RX ORDER — ATORVASTATIN CALCIUM 10 MG/1
10 TABLET, FILM COATED ORAL
Refills: 0 | Status: DISCONTINUED | COMMUNITY
End: 2020-03-31

## 2020-03-31 RX ORDER — CEFADROXIL 500 MG/1
500 CAPSULE ORAL
Qty: 14 | Refills: 0 | Status: DISCONTINUED | COMMUNITY
Start: 2019-09-12 | End: 2020-03-31

## 2020-03-31 RX ORDER — CLINDAMYCIN HYDROCHLORIDE 300 MG/1
300 CAPSULE ORAL
Qty: 24 | Refills: 0 | Status: DISCONTINUED | COMMUNITY
Start: 2019-09-09 | End: 2020-03-31

## 2020-03-31 RX ORDER — DOXYCYCLINE HYCLATE 100 MG/1
100 CAPSULE ORAL
Qty: 40 | Refills: 0 | Status: DISCONTINUED | COMMUNITY
Start: 2019-12-17 | End: 2020-03-31

## 2020-03-31 RX ORDER — DOXYCYCLINE 75 MG/1
75 TABLET, FILM COATED ORAL
Qty: 90 | Refills: 0 | Status: DISCONTINUED | COMMUNITY
Start: 2019-11-15 | End: 2020-03-31

## 2020-03-31 RX ORDER — SIMVASTATIN 40 MG/1
40 TABLET, FILM COATED ORAL
Refills: 0 | Status: DISCONTINUED | COMMUNITY
End: 2020-03-31

## 2020-03-31 RX ORDER — DEXLANSOPRAZOLE 60 MG/1
60 CAPSULE, DELAYED RELEASE ORAL
Qty: 30 | Refills: 0 | Status: DISCONTINUED | COMMUNITY
Start: 2019-11-16 | End: 2020-03-31

## 2020-03-31 NOTE — ASSESSMENT
[FreeTextEntry1] : Observation\par Consider bronchoscopy if pers. fever.\par Probable repeat CT 3 months\par

## 2020-03-31 NOTE — PROCEDURE
[FreeTextEntry1] : CT reviewed compared to prior\par \par EXAM: CT CHEST \par \par \par PROCEDURE DATE: 03/26/2020 \par \par \par \par INTERPRETATION: Clinical information: Fever, fatigue and difficulty \par breathing. Exam is compared to previous studies of 2/14/2020 as well as \par 12/14/2019. \par \par CT scan of the chest was obtained without administration of intravenous \par contrast. \par \par Few small lymph nodes are present in the pretracheal space and the AP \par window. \par \par Heart is normal in size. An AICD is noted in place. Patient is status post \par CABG. No pericardial effusion is noted. \par \par No endobronchial lesions are noted. A 0.8 x 0.6 cm solid nodule in the \par apical segment of the right upper lobe has increased in size when compared \par to previous exam. Adjacent patchy groundglass opacities are noted within the \par right upper lobe. This has minimally decreased when compared to previous \par exam. No pleural effusions are noted. \par \par Below the diaphragm, visualized portions of the abdomen are unremarkable. \par \par Patient is status post thoracic spine surgery. \par \par Impression: 0.8 cm solid nodule in the right upper lobe has increased in \par size when compared to previous exam. The adjacent patchy groundglass \par opacities in the right upper lobe have decreased when compared to previous \par exam. Continued follow-up examination is recommended in 3 months to ensure \par resolution. \par \par \par \par NAGI DUARTE M.D., ATTENDING RADIOLOGIST \par This document has been electronically signed. Mar 26 2020 2:56PM

## 2020-03-31 NOTE — CONSULT LETTER
[Dear  ___] : Dear ~JEAN-CLAUDE, [Consult Letter:] : I had the pleasure of evaluating your patient, [unfilled]. [Please see my note below.] : Please see my note below. [Consult Closing:] : Thank you very much for allowing me to participate in the care of this patient.  If you have any questions, please do not hesitate to contact me. [Sincerely,] : Sincerely, [FreeTextEntry2] : Gomez Dupont MD\par  [FreeTextEntry3] : Prashanth Biswas MD FCCP\par

## 2020-03-31 NOTE — HISTORY OF PRESENT ILLNESS
[Former] : former [>= 30 pack years] : >= 30 pack years [TextBox_4] : Had temp 103 feb 11 th  to feb 29th went to hospital and had blood cultures/ [facility]. During the hospitaliztion decided should take out the metal in in neck sx placed in 2002. Had ENT involved\par March 3rd had one day of low grade temp, again March 100.2, saw dr alexander the 24 th and had blood cultures and labs . WBC sl elevated.\par had a recent ct chest march 26th. finished doxy 2 weeks worth from dr alexander march 20th  and had low grade temp after. No inhaler use\par \par no cough just sob even at rest [TextBox_13] : 40 [TextBox_15] : 2019

## 2020-03-31 NOTE — PHYSICAL EXAM
[General Appearance - Well Developed] : well developed [General Appearance - Well Nourished] : well nourished [Normal Oropharynx] : normal oropharynx [Jugular Venous Distention Increased] : there was no jugular-venous distention [Thyroid Diffuse Enlargement] : the thyroid was not enlarged [Heart Sounds] : normal S1 and S2 [Murmurs] : no murmurs present [Lungs Percussion] : the lungs were normal to percussion [Abdomen Soft] : soft [Abdomen Tenderness] : non-tender [] : no hepato-splenomegaly [Nail Clubbing] : no clubbing of the fingernails [Cyanosis, Localized] : no localized cyanosis [FreeTextEntry1] : Rare crackles right base

## 2020-03-31 NOTE — DISCUSSION/SUMMARY
[FreeTextEntry1] : RUL opacity likely infectious inflammatory. Has improved and not present on CT of 8/1/19\par RUL nodule with increase in size. 8 MM  Will need close observation and possible bx. \par Fever of unclear etiology\par COPD mild.\par Asbestos exposure.\par

## 2020-04-08 ENCOUNTER — APPOINTMENT (OUTPATIENT)
Dept: PULMONOLOGY | Facility: CLINIC | Age: 60
End: 2020-04-08
Payer: MEDICARE

## 2020-04-08 DIAGNOSIS — R91.8 OTHER NONSPECIFIC ABNORMAL FINDING OF LUNG FIELD: ICD-10-CM

## 2020-04-08 PROCEDURE — 99213 OFFICE O/P EST LOW 20 MIN: CPT | Mod: 95

## 2020-04-08 NOTE — HISTORY OF PRESENT ILLNESS
[Home] : at home, [unfilled] , at the time of the visit. [Medical Office: ([facility])___] : at ~his/her~ medical office located in [place] [TextBox_4] : This visit was provided via telehealth using real-time 2-way audio visual technology. The patient, MADELINE HUERTA , was located at home, [address] [place] [address]\[place] [address]  at the time of the visit.\par The provider, Prashanth Biswas, was located at office [facility] [place], [place] at the time of the visit. \par \par  The patient, Ms. MADELINE HUERTA  and Physician Prashanth Garcia participated in the telehealth encounter.\par \par Verbal consent obtained by  from patient\par \par 1 wk ago low grade fever. \par Had swelling in elbow.\par Pers. fever now resolved.\par Then swelling on other arm in wrist.\par \par Awaiting bx of lung lesion at [facility].\par Improvement in SOB on diuretic.

## 2020-04-08 NOTE — DISCUSSION/SUMMARY
[FreeTextEntry1] : Recc fevers of unclear etiology. ? rheumatic in origin\par Lung lesion\par Cannot exclude COVID

## 2020-04-08 NOTE — ASSESSMENT
[FreeTextEntry1] : Rheum evaluation\par Being evaluated Lauri Reyes for possible biopsy\par For COVID testing\par [facility]

## 2020-04-08 NOTE — REVIEW OF SYSTEMS
[Poor Appetite] : poor appetite [Negative] : Endocrine [TextBox_30] : As per history above [TextBox_94] : As per history above

## 2020-06-03 ENCOUNTER — APPOINTMENT (OUTPATIENT)
Dept: NEUROSURGERY | Facility: CLINIC | Age: 60
End: 2020-06-03
Payer: MEDICARE

## 2020-06-03 DIAGNOSIS — R50.9 FEVER, UNSPECIFIED: ICD-10-CM

## 2020-06-03 DIAGNOSIS — T84.216A BREAKDOWN (MECHANICAL) OF INTERNAL FIXATION DEVICE OF VERTEBRAE, INITIAL ENCOUNTER: ICD-10-CM

## 2020-06-03 PROCEDURE — 99213 OFFICE O/P EST LOW 20 MIN: CPT | Mod: 95

## 2020-06-16 NOTE — REASON FOR VISIT
[New Patient Visit] : a new patient visit [FreeTextEntry1] : history of cervical fusion hardware failure

## 2020-06-16 NOTE — HISTORY OF PRESENT ILLNESS
[de-identified] : This visit was conducted via two-way telehealth video conference platform. Consent was obtained. The patient, Lela Mitchell, was at home and the neurosurgeon, Dr. Debra Hernandez was in a [facility] office, [address] [place] [address]. The patient, Lela Mitchell, and Dr. Hernandez were present for the entire visit. No physical exam was performed given the virtual nature of the visit.\par \par Patient is a 60 yo F with a history of an ACDF who underwent removal of hardware in February with Dr. Steward and Dr. Eden for infected/broken hardware. She has a recent CT that shows intact fusion. She has no new complaints. Her wound has healed well. She is off all antibiotics. She is eating and swallowing normally. \par

## 2020-06-17 ENCOUNTER — APPOINTMENT (OUTPATIENT)
Dept: OTOLARYNGOLOGY | Facility: CLINIC | Age: 60
End: 2020-06-17

## 2020-07-28 DIAGNOSIS — Z20.828 CONTACT WITH AND (SUSPECTED) EXPOSURE TO OTHER VIRAL COMMUNICABLE DISEASES: ICD-10-CM

## 2020-07-29 LAB — SARS-COV-2 N GENE NPH QL NAA+PROBE: NOT DETECTED

## 2020-07-30 ENCOUNTER — APPOINTMENT (OUTPATIENT)
Dept: PULMONOLOGY | Facility: CLINIC | Age: 60
End: 2020-07-30
Payer: MEDICARE

## 2020-07-30 VITALS
OXYGEN SATURATION: 98 % | RESPIRATION RATE: 16 BRPM | TEMPERATURE: 97.2 F | DIASTOLIC BLOOD PRESSURE: 60 MMHG | HEART RATE: 66 BPM | SYSTOLIC BLOOD PRESSURE: 95 MMHG

## 2020-07-30 DIAGNOSIS — J44.9 CHRONIC OBSTRUCTIVE PULMONARY DISEASE, UNSPECIFIED: ICD-10-CM

## 2020-07-30 LAB — POCT - HEMOGLOBIN (HGB), QUANTITATIVE, TRANSCUTANEOUS: 11.2

## 2020-07-30 PROCEDURE — 94726 PLETHYSMOGRAPHY LUNG VOLUMES: CPT

## 2020-07-30 PROCEDURE — 88738 HGB QUANT TRANSCUTANEOUS: CPT

## 2020-07-30 PROCEDURE — 94750: CPT

## 2020-07-30 PROCEDURE — 94729 DIFFUSING CAPACITY: CPT

## 2020-07-30 PROCEDURE — 94060 EVALUATION OF WHEEZING: CPT

## 2020-09-04 ENCOUNTER — APPOINTMENT (OUTPATIENT)
Dept: UROLOGY | Facility: CLINIC | Age: 60
End: 2020-09-04
Payer: MEDICARE

## 2020-09-04 VITALS — TEMPERATURE: 97.2 F

## 2020-09-04 DIAGNOSIS — Z80.3 FAMILY HISTORY OF MALIGNANT NEOPLASM OF BREAST: ICD-10-CM

## 2020-09-04 DIAGNOSIS — Z78.9 OTHER SPECIFIED HEALTH STATUS: ICD-10-CM

## 2020-09-04 DIAGNOSIS — Z82.49 FAMILY HISTORY OF ISCHEMIC HEART DISEASE AND OTHER DISEASES OF THE CIRCULATORY SYSTEM: ICD-10-CM

## 2020-09-04 DIAGNOSIS — Z95.0 PRESENCE OF CARDIAC PACEMAKER: ICD-10-CM

## 2020-09-04 DIAGNOSIS — Z85.118 PERSONAL HISTORY OF OTHER MALIGNANT NEOPLASM OF BRONCHUS AND LUNG: ICD-10-CM

## 2020-09-04 PROCEDURE — 99204 OFFICE O/P NEW MOD 45 MIN: CPT

## 2020-09-04 RX ORDER — DIAZEPAM 10 MG/1
10 TABLET ORAL
Refills: 0 | Status: ACTIVE | COMMUNITY

## 2020-09-04 RX ORDER — ASPIRIN 325 MG
TABLET ORAL
Refills: 0 | Status: ACTIVE | COMMUNITY

## 2020-09-04 RX ORDER — SIMVASTATIN 40 MG/1
40 TABLET, FILM COATED ORAL
Refills: 0 | Status: ACTIVE | COMMUNITY

## 2020-09-04 RX ORDER — CLOPIDOGREL BISULFATE 300 MG/1
300 TABLET, FILM COATED ORAL
Refills: 0 | Status: ACTIVE | COMMUNITY

## 2020-09-04 NOTE — REVIEW OF SYSTEMS
[Fever] : fever [Chills] : chills [Feeling Tired] : feeling tired [Chest Pain] : chest pain [Palpitations] : palpitations [Abdominal Pain] : abdominal pain [Shortness Of Breath] : shortness of breath [Diarrhea] : diarrhea [Vomiting] : vomiting [Constipation] : constipation [Heartburn] : heartburn [Date of last menstrual period ____] : date of last menstrual period: [unfilled] [Blood in urine that you can see] : blood visible in urine [Urine Infection (bladder/kidney)] : bladder/kidney infection [Told you have blood in urine on a urine test] : told blood was present in a urine test [Wake up at night to urinate  How many times?  ___] : wakes up to urinate [unfilled] times during the night [Strong urge to urinate] : strong urge to urinate [Slow urine stream] : slow urine stream [Interrupted urine stream] : interrupted urine stream [Joint Pain] : joint pain [Muscle Weakness] : muscle weakness [Limb Weakness] : limb weakness [Negative] : Heme/Lymph

## 2020-09-04 NOTE — PHYSICAL EXAM
[Normal Appearance] : normal appearance [General Appearance - Well Nourished] : well nourished [General Appearance - Well Developed] : well developed [Edema] : no peripheral edema [General Appearance - In No Acute Distress] : no acute distress [Well Groomed] : well groomed [Exaggerated Use Of Accessory Muscles For Inspiration] : no accessory muscle use [Respiration, Rhythm And Depth] : normal respiratory rhythm and effort [Costovertebral Angle Tenderness] : no ~M costovertebral angle tenderness [Abdomen Soft] : soft [Abdomen Tenderness] : non-tender [Urinary Bladder Findings] : the bladder was normal on palpation [Normal Station and Gait] : the gait and station were normal for the patient's age [] : no rash [Affect] : the affect was normal [Oriented To Time, Place, And Person] : oriented to person, place, and time [No Focal Deficits] : no focal deficits [Mood] : the mood was normal [No Palpable Adenopathy] : no palpable adenopathy [Not Anxious] : not anxious

## 2020-09-08 LAB
APPEARANCE: CLEAR
BACTERIA UR CULT: NORMAL
BACTERIA: NEGATIVE
BILIRUBIN URINE: NEGATIVE
BLOOD URINE: NEGATIVE
COLOR: NORMAL
GLUCOSE QUALITATIVE U: NEGATIVE
HYALINE CASTS: 0 /LPF
KETONES URINE: NEGATIVE
LEUKOCYTE ESTERASE URINE: NEGATIVE
MICROSCOPIC-UA: NORMAL
NITRITE URINE: NEGATIVE
PH URINE: 6.5
PROTEIN URINE: NEGATIVE
RED BLOOD CELLS URINE: 1 /HPF
SPECIFIC GRAVITY URINE: 1.01
SQUAMOUS EPITHELIAL CELLS: 0 /HPF
UROBILINOGEN URINE: NORMAL
WHITE BLOOD CELLS URINE: 0 /HPF

## 2020-09-09 LAB — URINE CYTOLOGY: NORMAL

## 2020-09-10 ENCOUNTER — APPOINTMENT (OUTPATIENT)
Dept: UROLOGY | Facility: CLINIC | Age: 60
End: 2020-09-10
Payer: MEDICARE

## 2020-09-10 VITALS
SYSTOLIC BLOOD PRESSURE: 126 MMHG | RESPIRATION RATE: 16 BRPM | HEART RATE: 58 BPM | DIASTOLIC BLOOD PRESSURE: 79 MMHG | TEMPERATURE: 97.4 F | OXYGEN SATURATION: 97 %

## 2020-09-10 DIAGNOSIS — R31.0 GROSS HEMATURIA: ICD-10-CM

## 2020-09-10 PROCEDURE — 52000 CYSTOURETHROSCOPY: CPT

## 2020-09-11 NOTE — ASSESSMENT
[FreeTextEntry1] : H/o gross hematuria over some time prior to hospitalization with lung cancer, and again during admission\par CT reported 'okay' for upper tracts, but will need to review.  Hematuria eval.\par \par \par 1. urine cytol, u/a with micro, urine culture (given recent "UTI")\par 2. RTC 1 to 2 weeks for cysto\par 3. obtain films from [place] for CT

## 2020-09-11 NOTE — HISTORY OF PRESENT ILLNESS
[None] : no symptoms [FreeTextEntry1] : MADELINE HUERTA is a 59 year F who presents for gross hematuria---> has been having for months, didn't tell anyone.  Recently, underwent lung cancer resection, had hematuria with ruiz in.  H/o some uti.  H/o smoking in past.  Here for eval- reports CT of abdomen "okay" outside hospital in the city.  No dysuria, clots. Urine clear today. Reports family history of stones.\par \par PMH: lung ca. copd\par PSH: pacemaker/aicd, lung resection, hip replacement\par FH: mother with breast cancer, FH of stones\par Meds: reviewed in allscript\par SH: former smoker\par Allergies: NKDA\par

## 2020-09-11 NOTE — LETTER BODY
[Dear  ___] : Dear  [unfilled], [Courtesy Letter:] : I had the pleasure of seeing your patient, [unfilled], in my office today. [Consult Closing:] : Thank you very much for allowing me to participate in the care of this patient.  If you have any questions, please do not hesitate to contact me. [Sincerely,] : Sincerely, [Please see my note below.] : Please see my note below. [FreeTextEntry1] : MADELINE HUERTA is a 59 year F who presents for gross hematuria---> has been having for months, didn't tell anyone.  Recently, underwent lung cancer resection, had hematuria with ruiz in.  H/o some uti.  H/o smoking in past.  Here for eval- reports CT of abdomen "okay" outside hospital [place] the city.  No dysuria, clots. Urine clear today. Reports family history of stones.\par \par PMH: lung ca. copd\par PSH: pacemaker/aicd, lung resection, hip replacement\par FH: mother with breast cancer, FH of stones\par Meds: reviewed in allscript\par SH: former smoker\par Allergies: NKDA\par \par H/o gross hematuria over some time prior to hospitalization with lung cancer, and again during admission\par CT reported 'okay' for upper tracts, but will need to review.  Hematuria eval.\par \par 1. urine cytol, u/a with micro, urine culture (given recent "UTI")\par 2. RTC 1 to 2 weeks for cysto\par 3. obtain films from [place] for CT \par \par Addendum: cytology and culture and cysto all negative.  WIll review films to ensure, but appears negative hematuria w/u at this time.

## 2020-10-30 ENCOUNTER — APPOINTMENT (OUTPATIENT)
Dept: CT IMAGING | Facility: IMAGING CENTER | Age: 60
End: 2020-10-30
Payer: MEDICARE

## 2020-10-30 ENCOUNTER — OUTPATIENT (OUTPATIENT)
Dept: OUTPATIENT SERVICES | Facility: HOSPITAL | Age: 60
LOS: 1 days | End: 2020-10-30
Payer: MEDICARE

## 2020-10-30 DIAGNOSIS — Z41.9 ENCOUNTER FOR PROCEDURE FOR PURPOSES OTHER THAN REMEDYING HEALTH STATE, UNSPECIFIED: Chronic | ICD-10-CM

## 2020-10-30 DIAGNOSIS — Z00.8 ENCOUNTER FOR OTHER GENERAL EXAMINATION: ICD-10-CM

## 2020-10-30 PROCEDURE — 72128 CT CHEST SPINE W/O DYE: CPT

## 2020-10-30 PROCEDURE — 72128 CT CHEST SPINE W/O DYE: CPT | Mod: 26

## 2020-10-30 PROCEDURE — 74177 CT ABD & PELVIS W/CONTRAST: CPT

## 2020-10-30 PROCEDURE — 74177 CT ABD & PELVIS W/CONTRAST: CPT | Mod: 26

## 2020-12-21 NOTE — DISCHARGE NOTE NURSING/CASE MANAGEMENT/SOCIAL WORK - NSDCPEPT PROEDMA_GEN_ALL_CORE
Detail Level: Detailed
Quality 110: Preventive Care And Screening: Influenza Immunization: Influenza Immunization Ordered or Recommended, but not Administered due to system reason
Yes

## 2021-03-03 NOTE — ASSESSMENT
Is This A New Presentation, Or A Follow-Up?: Skin Lesions [FreeTextEntry1] : Patient is a 58 yo F s/p revision of ACDF hardware 2/2020. Patient is neurologically at her baseline and doing well at this time. Recommend:\par - f/u with Dr. Steward\par - f/u with Dr. Eden How Severe Is Your Skin Lesion?: mild Have Your Skin Lesions Been Treated?: not been treated

## 2021-06-01 NOTE — PRE-OP CHECKLIST - NOTHING BY MOUTH SINCE
[Home] : at home, [unfilled] , at the time of the visit. [Other Location: e.g. Home (Enter Location, City,State)___] : at [unfilled] [Spouse] : spouse [Verbal consent obtained from patient] : the patient, [unfilled] [de-identified] : 66-year-old female with history of hyperlipidemia postmenopausal who requests follow-up via telehealth to review bone density study that was done on May 19, 2021.  She has no prior bone density done of note results show in the spine T score of -2.7 consistent with osteoporosis.  Hip also shows a femoral neck T score -2.6 also consistent with osteoporosis of the total hip is -1.4 osteopenic.  Overall impression is osteoporosis.  Fracture risk is above average.  Medical therapy to be considered.\par Discussed in depth the use of alendronate ( Fosamax).  Risk and benefits of leisure discussed.  Labs reviewed and indicate no vitamin D over the years as patient would forget to take vitamin D.  Recommend weightbearing exercises also the patient likes being sedentary. 25-Feb-2020 00:00 24-Feb-2020 20:00

## 2022-03-24 NOTE — PATIENT PROFILE ADULT - NSPROIMPLANTSMEDDEV_GEN_A_NUR
Automatic Implantable Cardioverter Defibrillator/Vascular stents/Clips/Pacemaker Automatic Implantable Cardioverter Defibrillator/Pacemaker/Vascular stents/Clips/screws on back Bcc  Nodular Histology Text: Discrete nests/nodules of malignant basaloid tumor is present within the dermis and/or attached to the epidermis. The tumor demonstrates palisading and is retracted away from a surrounding mucoid stroma.

## 2023-04-21 NOTE — ED PROVIDER NOTE - CROS ED ENMT ALL NEG
Anesthesia Pre Eval Note    Anesthesia ROS/Med Hx    Overall Review:  Echo was reviewed     Anesthetic Complication History:  Patient does not have a history of anesthetic complications      Pulmonary Review:    Positive for asthma    Neuro/Psych Review:  Patient does not have a neuro/psych history       Cardiovascular Review:    Positive for valvular problems/murmurs  Positive for hypertension    GI/HEPATIC/RENAL Review:  Patient does not have a GI/hepatic/renalhistory       End/Other Review:    Positive for obesity   Positive for hypothyroidism  Positive for chronic pain    Overall Review of Systems Comments:  RA  PMR  Giant cell arteritis  Additional Results:  Echo:  Ejection Fraction       Date                     Value               Ref Range           Status                10/27/2022               75                  %                   Final            ----------   ALLERGIES:   -- Penicillins -- Other (See Comments)    --  -Prick testing to Pre-Pen and Penicillin G 10,000             units negative. Intradermal testing to Pre-Pen and             Penicillin G 10,000 units negative in duplicate.             See allergy note 9-6-2022- The negative predictive             value for penicillin testing with PrePen and             Penicillin G is approximately 98%.-Patient aware             that testing only evaluates IgE mediated reactions   -- Sulfa Antibiotics -- Other (See Comments)   -- Cefuroxime -- NAUSEA   -- Latex -- Other (See Comments)   -- Zolpidem -- Other (See Comments)    --  Disturbing dreams     Past Medical History:  No date: Allergic rhinitis  No date: Asthma  No date: Carpal tunnel syndrome  No date: Cervical radiculopathy  No date: Cervicalgia  No date: Closed left ankle fracture  No date: Dry eye syndrome  No date: Heart murmur  No date: HTN (hypertension)  No date: Hypothyroidism  No date: Lumbosacral radiculopathy  No date: Obesity  No date: Prediabetes  No date: RLS (restless legs  syndrome)  No date: Rosacea  No date: Seborrheic keratosis    Past Surgical History:  No date: Ankle fracture surgery; Left      Comment:  And hardware removal surgery  No date: Appendectomy  03/02/2023: Colonoscopy      Comment:  Dr. Flores - 5 year recall  03/02/2023: Egd      Comment:  Dr. Flores  No date: Endometrial biopsy  No date: Fracture surgery  No date: Joint replacement  04/03/2023: Joint replacement; Right      Comment:  hip  10/18/2022: Temporal artery ligatn or bx; Right      Comment:  By Dr. East  No date: Tonsillectomy       Patient Vitals in the past 24 hrs:  04/21/23 0615, BP:(!) 154/87, Temp:36.8 °C (98.3 °F), Temp src:Temporal, Pulse:78, Resp:20, SpO2:94 %, Height:5' 9\" (1.753 m), Weight:115.6 kg (254 lb 13.6 oz)      Relevant Problems   No relevant active problems       Physical Exam     Airway   Mallampati: III  TM Distance: >3 FB  Neck ROM: Full  TMJ Mobility: Good    Cardiovascular  Cardiovascular exam normal    Head Assessment  Head assessment: Normocephalic and Atraumatic    General Assessment  General Assessment: Alert and oriented and No acute distress    Dental Exam  Dental exam normal    Pulmonary Exam  Pulmonary exam normal    Abdominal Exam    Patient Demonstrates:  Obese      Anesthesia Plan:    Phone call attempted:   ASA Status: 3  Anesthesia Type: General    Induction: Intravenous  Preferred Airway Type: ETTPatient does not have a difficult airway or is not at risk of aspiration.   Maintenance: Combined  Premedication: Oral  Patient does not have an implantable electronic device requiring post procedure programming.     Post-op Pain Management: Per Surgeon      Checklist  Reviewed: Lab Results, Patient Summary, Allergies, Past Med History, Medications, Problem list and NPO Status  Consent/Risks Discussed Statement:  The proposed anesthetic plan, including its risks and benefits, have been discussed with the Patient along with the risks and benefits of  alternatives. Questions were encouraged and answered and the patient and/or representative understands and agrees to proceed.    I have discussed elements of the patient's history or examination, as noted above and/or as follows, that place the patient at higher risk of complications; BMI> 30 (obesity) and pulmonary disease.    I discussed with the patient (and/or patient's legal representative) the risks and benefits of the proposed anesthesia plan, General, which may include services performed by other anesthesia providers.    Alternative anesthesia plans, if available, were reviewed with the patient (and/or patient's legal representative). Discussion has been held with the patient (and/or patient's legal representative) regarding risks of anesthesia, which include allergic reaction, aspiration, dental injury, emergence delirium, intra-operative awareness, nausea, vomiting, sore throat and oral injury and emergent situations that may require change in anesthesia plan.    The patient (and/or patient's legal representative) has indicated understanding, his/her questions have been answered, and he/she wishes to proceed with the planned anesthetic.    Blood Products: Not Anticipated    Comments  Plan Comments: Risks, benefits, and alternatives of Anesthesic plan were discussed with patient and/or patient representative.  Questions related to the Anesthetic plan were answered and patient agrees to proceed.  Risks discussed include dental or eye injury; position-related nerve complications; postoperative respiratory complications including aspiration and atelectasis; sore throat; allergic reactions to anesthesia and/or analgesics; Malignant Hyperthermia; arrhythmias; postoperative nausea and vomiting.    Plan KIMANI Arboleda MD  [facility], Anesthesiology  Pager [phone]      - - -

## 2025-06-09 NOTE — ED ADULT NURSE NOTE - NURSING ED SKIN COLOR
Problem: Safety - Adult  Goal: Free from fall injury  6/1/2025 2257 by Mallika Lenz RN  Outcome: Progressing  6/1/2025 0911 by Chari Ann RN  Outcome: Progressing     Problem: Chronic Conditions and Co-morbidities  Goal: Patient's chronic conditions and co-morbidity symptoms are monitored and maintained or improved  6/1/2025 2257 by Mallika Lenz RN  Outcome: Progressing  6/1/2025 0911 by Chari Ann RN  Outcome: Progressing     Problem: Discharge Planning  Goal: Discharge to home or other facility with appropriate resources  6/1/2025 2257 by Mallika Lenz RN  Outcome: Progressing  6/1/2025 0911 by Chari Ann RN  Outcome: Progressing     Problem: Pain  Goal: Verbalizes/displays adequate comfort level or baseline comfort level  6/1/2025 2257 by Mallika Lenz RN  Outcome: Progressing  6/1/2025 0911 by Chari Ann RN  Outcome: Progressing     Problem: ABCDS Injury Assessment  Goal: Absence of physical injury  6/1/2025 2257 by Mallika Lenz RN  Outcome: Progressing  6/1/2025 0911 by Chari Ann RN  Outcome: Progressing     
  Problem: Safety - Adult  Goal: Free from fall injury  6/4/2025 1820 by Sana Salcido RN  Outcome: Progressing  6/4/2025 0521 by Iveth Rodriguez RN  Outcome: Progressing     Problem: Chronic Conditions and Co-morbidities  Goal: Patient's chronic conditions and co-morbidity symptoms are monitored and maintained or improved  6/4/2025 1820 by Sana Salcido RN  Outcome: Progressing  6/4/2025 0521 by Iveth Rodriguez RN  Outcome: Progressing     Problem: Discharge Planning  Goal: Discharge to home or other facility with appropriate resources  6/4/2025 1820 by Sana Salcido RN  Outcome: Progressing  6/4/2025 0521 by Iveth Rodriguez RN  Outcome: Progressing     Problem: Pain  Goal: Verbalizes/displays adequate comfort level or baseline comfort level  6/4/2025 1820 by Sana Salcido RN  Outcome: Progressing  6/4/2025 0521 by Iveth Rodriguez RN  Outcome: Progressing     Problem: ABCDS Injury Assessment  Goal: Absence of physical injury  6/4/2025 1820 by Sana Salcido RN  Outcome: Progressing  6/4/2025 0521 by Iveth Rodriguez RN  Outcome: Progressing     Problem: Skin/Tissue Integrity  Goal: Skin integrity remains intact  Description: 1.  Monitor for areas of redness and/or skin breakdown2.  Assess vascular access sites hourly3.  Every 4-6 hours minimum:  Change oxygen saturation probe site4.  Every 4-6 hours:  If on nasal continuous positive airway pressure, respiratory therapy assess nares and determine need for appliance change or resting period  6/4/2025 1820 by Sana Salcido RN  Outcome: Progressing  6/4/2025 0521 by Iveth Rodriguez RN  Outcome: Progressing     
  Problem: Safety - Adult  Goal: Free from fall injury  6/4/2025 2257 by Aleyda Varner RN  Outcome: Progressing     Problem: Chronic Conditions and Co-morbidities  Goal: Patient's chronic conditions and co-morbidity symptoms are monitored and maintained or improved  6/4/2025 2257 by Aleyda Varner RN  Outcome: Progressing     Problem: Discharge Planning  Goal: Discharge to home or other facility with appropriate resources  6/4/2025 2257 by Aleyda Varner RN  Outcome: Progressing     Problem: Pain  Goal: Verbalizes/displays adequate comfort level or baseline comfort level  6/4/2025 2257 by Aleyda Varner RN  Outcome: Progressing     
  Problem: Safety - Adult  Goal: Free from fall injury  6/5/2025 2126 by Aleyda Varner RN  Outcome: Progressing     Problem: Chronic Conditions and Co-morbidities  Goal: Patient's chronic conditions and co-morbidity symptoms are monitored and maintained or improved  6/5/2025 2126 by Aleyda Varner RN  Outcome: Progressing     Problem: Discharge Planning  Goal: Discharge to home or other facility with appropriate resources  6/5/2025 2126 by Aleyda Varner RN  Outcome: Progressing     Problem: Pain  Goal: Verbalizes/displays adequate comfort level or baseline comfort level  6/5/2025 2126 by Aleyda Varner RN  Outcome: Progressing     
  Problem: Safety - Adult  Goal: Free from fall injury  6/6/2025 1016 by Tammi Pham, RN  Outcome: Progressing  6/5/2025 2126 by Aleyda Varner, RN  Outcome: Progressing     
  Problem: Safety - Adult  Goal: Free from fall injury  6/6/2025 2351 by Owen Upton RN  Outcome: Progressing     Problem: Chronic Conditions and Co-morbidities  Goal: Patient's chronic conditions and co-morbidity symptoms are monitored and maintained or improved  6/6/2025 2351 by Owen Upton RN  Outcome: Progressing     Problem: Discharge Planning  Goal: Discharge to home or other facility with appropriate resources  6/6/2025 2351 by Owen Upton RN  Outcome: Progressing     Problem: Pain  Goal: Verbalizes/displays adequate comfort level or baseline comfort level  6/6/2025 2351 by Owen Upton RN  Outcome: Progressing     Problem: ABCDS Injury Assessment  Goal: Absence of physical injury  Outcome: Progressing     Problem: Skin/Tissue Integrity  Goal: Skin integrity remains intact  Description: 1.  Monitor for areas of redness and/or skin breakdown2.  Assess vascular access sites hourly3.  Every 4-6 hours minimum:  Change oxygen saturation probe site4.  Every 4-6 hours:  If on nasal continuous positive airway pressure, respiratory therapy assess nares and determine need for appliance change or resting period  Outcome: Progressing     Problem: Nutrition Deficit:  Goal: Optimize nutritional status  Outcome: Progressing     
  Problem: Safety - Adult  Goal: Free from fall injury  Outcome: Progressing     Problem: Chronic Conditions and Co-morbidities  Goal: Patient's chronic conditions and co-morbidity symptoms are monitored and maintained or improved  6/4/2025 0521 by Iveth Rodriguez RN  Outcome: Progressing  6/3/2025 1841 by Jaqueline Lew RN  Outcome: Progressing     Problem: Discharge Planning  Goal: Discharge to home or other facility with appropriate resources  6/4/2025 0521 by Iveth Rodriguez RN  Outcome: Progressing  6/3/2025 1841 by Jaqueline Lew RN  Outcome: Progressing     Problem: Pain  Goal: Verbalizes/displays adequate comfort level or baseline comfort level  6/4/2025 0521 by Iveth Rodriguez RN  Outcome: Progressing  6/3/2025 1841 by Jaqueline Lew RN  Outcome: Progressing     Problem: ABCDS Injury Assessment  Goal: Absence of physical injury  Outcome: Progressing     Problem: Skin/Tissue Integrity  Goal: Skin integrity remains intact  Description: 1.  Monitor for areas of redness and/or skin breakdown2.  Assess vascular access sites hourly3.  Every 4-6 hours minimum:  Change oxygen saturation probe site4.  Every 4-6 hours:  If on nasal continuous positive airway pressure, respiratory therapy assess nares and determine need for appliance change or resting period  Outcome: Progressing     
  Problem: Safety - Adult  Goal: Free from fall injury  Outcome: Progressing     Problem: Chronic Conditions and Co-morbidities  Goal: Patient's chronic conditions and co-morbidity symptoms are monitored and maintained or improved  Outcome: Progressing     Problem: Discharge Planning  Goal: Discharge to home or other facility with appropriate resources  Outcome: Progressing     Problem: Pain  Goal: Verbalizes/displays adequate comfort level or baseline comfort level  Outcome: Progressing     Problem: ABCDS Injury Assessment  Goal: Absence of physical injury  Outcome: Progressing     Problem: Skin/Tissue Integrity  Goal: Skin integrity remains intact  Description: 1.  Monitor for areas of redness and/or skin breakdown2.  Assess vascular access sites hourly3.  Every 4-6 hours minimum:  Change oxygen saturation probe site4.  Every 4-6 hours:  If on nasal continuous positive airway pressure, respiratory therapy assess nares and determine need for appliance change or resting period  Outcome: Progressing     
  Problem: Safety - Adult  Goal: Free from fall injury  Outcome: Progressing     Problem: Chronic Conditions and Co-morbidities  Goal: Patient's chronic conditions and co-morbidity symptoms are monitored and maintained or improved  Outcome: Progressing     Problem: Discharge Planning  Goal: Discharge to home or other facility with appropriate resources  Outcome: Progressing     Problem: Pain  Goal: Verbalizes/displays adequate comfort level or baseline comfort level  Outcome: Progressing     Problem: ABCDS Injury Assessment  Goal: Absence of physical injury  Outcome: Progressing     Problem: Skin/Tissue Integrity  Goal: Skin integrity remains intact  Description: 1.  Monitor for areas of redness and/or skin breakdown2.  Assess vascular access sites hourly3.  Every 4-6 hours minimum:  Change oxygen saturation probe site4.  Every 4-6 hours:  If on nasal continuous positive airway pressure, respiratory therapy assess nares and determine need for appliance change or resting period  Outcome: Progressing     
  Problem: Safety - Adult  Goal: Free from fall injury  Outcome: Progressing     Problem: Chronic Conditions and Co-morbidities  Goal: Patient's chronic conditions and co-morbidity symptoms are monitored and maintained or improved  Outcome: Progressing     Problem: Discharge Planning  Goal: Discharge to home or other facility with appropriate resources  Outcome: Progressing     Problem: Pain  Goal: Verbalizes/displays adequate comfort level or baseline comfort level  Outcome: Progressing     Problem: ABCDS Injury Assessment  Goal: Absence of physical injury  Outcome: Progressing     Problem: Skin/Tissue Integrity  Goal: Skin integrity remains intact  Description: 1.  Monitor for areas of redness and/or skin breakdown2.  Assess vascular access sites hourly3.  Every 4-6 hours minimum:  Change oxygen saturation probe site4.  Every 4-6 hours:  If on nasal continuous positive airway pressure, respiratory therapy assess nares and determine need for appliance change or resting period  Outcome: Progressing     Problem: Nutrition Deficit:  Goal: Optimize nutritional status  Outcome: Progressing     
  Problem: Safety - Adult  Goal: Free from fall injury  Outcome: Progressing     Problem: Chronic Conditions and Co-morbidities  Goal: Patient's chronic conditions and co-morbidity symptoms are monitored and maintained or improved  Outcome: Progressing     Problem: Discharge Planning  Goal: Discharge to home or other facility with appropriate resources  Outcome: Progressing  Flowsheets  Taken 6/1/2025 0626  Discharge to home or other facility with appropriate resources: Identify barriers to discharge with patient and caregiver  Taken 6/1/2025 0623  Discharge to home or other facility with appropriate resources:   Identify barriers to discharge with patient and caregiver   Identify discharge learning needs (meds, wound care, etc)     Problem: Pain  Goal: Verbalizes/displays adequate comfort level or baseline comfort level  Outcome: Progressing  Flowsheets (Taken 6/1/2025 0600)  Verbalizes/displays adequate comfort level or baseline comfort level:   Encourage patient to monitor pain and request assistance   Assess pain using appropriate pain scale   Implement non-pharmacological measures as appropriate and evaluate response     Problem: ABCDS Injury Assessment  Goal: Absence of physical injury  Outcome: Progressing     
CHANTE Jj Thyroid  Start levothyroxine 125 mcg p.o. daily  Take your thyroid medicine by itself with water at least 1 hour before or after the pills and food  Check TSH and free T4 in 6 weeks  Start Ozempic 0.25 mg subcu weekly for 4 weeks and if you are able to tolerate then increase the dose to 0.5 mg subcu weekly  Arrange for comprehensive dives education  Continue to work on your diet and activity  Annual eye exam  Labs before follow-up  
normal for race